# Patient Record
Sex: FEMALE | Race: WHITE | Employment: OTHER | URBAN - METROPOLITAN AREA
[De-identification: names, ages, dates, MRNs, and addresses within clinical notes are randomized per-mention and may not be internally consistent; named-entity substitution may affect disease eponyms.]

---

## 2018-03-11 ENCOUNTER — APPOINTMENT (OUTPATIENT)
Dept: GENERAL RADIOLOGY | Facility: HOSPITAL | Age: 73
DRG: 040 | End: 2018-03-11
Attending: EMERGENCY MEDICINE
Payer: MEDICARE

## 2018-03-11 ENCOUNTER — APPOINTMENT (OUTPATIENT)
Dept: CV DIAGNOSTICS | Facility: HOSPITAL | Age: 73
DRG: 040 | End: 2018-03-11
Attending: INTERNAL MEDICINE
Payer: MEDICARE

## 2018-03-11 ENCOUNTER — APPOINTMENT (OUTPATIENT)
Dept: CT IMAGING | Facility: HOSPITAL | Age: 73
DRG: 040 | End: 2018-03-11
Attending: NURSE PRACTITIONER
Payer: MEDICARE

## 2018-03-11 ENCOUNTER — HOSPITAL ENCOUNTER (INPATIENT)
Facility: HOSPITAL | Age: 73
LOS: 10 days | Discharge: NURSING, REHAB OR LONG-TERM CARE | DRG: 040 | End: 2018-03-21
Attending: EMERGENCY MEDICINE | Admitting: STUDENT IN AN ORGANIZED HEALTH CARE EDUCATION/TRAINING PROGRAM
Payer: MEDICARE

## 2018-03-11 ENCOUNTER — APPOINTMENT (OUTPATIENT)
Dept: CT IMAGING | Facility: HOSPITAL | Age: 73
DRG: 040 | End: 2018-03-11
Attending: EMERGENCY MEDICINE
Payer: MEDICARE

## 2018-03-11 ENCOUNTER — APPOINTMENT (OUTPATIENT)
Dept: ULTRASOUND IMAGING | Facility: HOSPITAL | Age: 73
DRG: 040 | End: 2018-03-11
Attending: INTERNAL MEDICINE
Payer: MEDICARE

## 2018-03-11 DIAGNOSIS — R77.8 TROPONIN LEVEL ELEVATED: ICD-10-CM

## 2018-03-11 DIAGNOSIS — I63.9 ACUTE CVA (CEREBROVASCULAR ACCIDENT) (HCC): Primary | ICD-10-CM

## 2018-03-11 DIAGNOSIS — R73.9 HYPERGLYCEMIA: ICD-10-CM

## 2018-03-11 DIAGNOSIS — E11.8 TYPE 2 DIABETES MELLITUS WITH COMPLICATION, WITHOUT LONG-TERM CURRENT USE OF INSULIN (HCC): ICD-10-CM

## 2018-03-11 DIAGNOSIS — I16.1 HYPERTENSIVE EMERGENCY: ICD-10-CM

## 2018-03-11 PROBLEM — R79.89 TROPONIN LEVEL ELEVATED: Status: ACTIVE | Noted: 2018-03-11

## 2018-03-11 LAB
ALBUMIN SERPL-MCNC: 3.5 G/DL (ref 3.5–4.8)
ALP LIVER SERPL-CCNC: 153 U/L (ref 55–142)
ALT SERPL-CCNC: 30 U/L (ref 14–54)
APTT PPP: 27.2 SECONDS (ref 25–34)
AST SERPL-CCNC: 31 U/L (ref 15–41)
BASOPHILS # BLD AUTO: 0.06 X10(3) UL (ref 0–0.1)
BASOPHILS NFR BLD AUTO: 0.5 %
BILIRUB SERPL-MCNC: 0.7 MG/DL (ref 0.1–2)
BILIRUB UR QL STRIP.AUTO: NEGATIVE
BUN BLD-MCNC: 16 MG/DL (ref 8–20)
CALCIUM BLD-MCNC: 9 MG/DL (ref 8.3–10.3)
CHLORIDE: 99 MMOL/L (ref 101–111)
CLARITY UR REFRACT.AUTO: CLEAR
CO2: 27 MMOL/L (ref 22–32)
CREAT BLD-MCNC: 1.05 MG/DL (ref 0.55–1.02)
EOSINOPHIL # BLD AUTO: 0.17 X10(3) UL (ref 0–0.3)
EOSINOPHIL NFR BLD AUTO: 1.4 %
ERYTHROCYTE [DISTWIDTH] IN BLOOD BY AUTOMATED COUNT: 12 % (ref 11.5–16)
EST. AVERAGE GLUCOSE BLD GHB EST-MCNC: 286 MG/DL (ref 68–126)
GLUCOSE BLD-MCNC: 174 MG/DL (ref 65–99)
GLUCOSE BLD-MCNC: 195 MG/DL (ref 65–99)
GLUCOSE BLD-MCNC: 217 MG/DL (ref 65–99)
GLUCOSE BLD-MCNC: 338 MG/DL (ref 65–99)
GLUCOSE BLD-MCNC: 367 MG/DL (ref 70–99)
GLUCOSE UR STRIP.AUTO-MCNC: >=500 MG/DL
HBA1C MFR BLD HPLC: 11.6 % (ref ?–5.7)
HCT VFR BLD AUTO: 46.2 % (ref 34–50)
HGB BLD-MCNC: 16.1 G/DL (ref 12–16)
IMMATURE GRANULOCYTE COUNT: 0.04 X10(3) UL (ref 0–1)
IMMATURE GRANULOCYTE RATIO %: 0.3 %
INR BLD: 1.03 (ref 0.89–1.11)
LEUKOCYTE ESTERASE UR QL STRIP.AUTO: NEGATIVE
LYMPHOCYTES # BLD AUTO: 2.7 X10(3) UL (ref 0.9–4)
LYMPHOCYTES NFR BLD AUTO: 22.7 %
M PROTEIN MFR SERPL ELPH: 7.5 G/DL (ref 6.1–8.3)
MCH RBC QN AUTO: 29.4 PG (ref 27–33.2)
MCHC RBC AUTO-ENTMCNC: 34.8 G/DL (ref 31–37)
MCV RBC AUTO: 84.5 FL (ref 81–100)
MONOCYTES # BLD AUTO: 0.81 X10(3) UL (ref 0.1–1)
MONOCYTES NFR BLD AUTO: 6.8 %
NEUTROPHIL ABS PRELIM: 8.13 X10 (3) UL (ref 1.3–6.7)
NEUTROPHILS # BLD AUTO: 8.13 X10(3) UL (ref 1.3–6.7)
NEUTROPHILS NFR BLD AUTO: 68.3 %
NITRITE UR QL STRIP.AUTO: NEGATIVE
PH UR STRIP.AUTO: 7 [PH] (ref 4.5–8)
PLATELET # BLD AUTO: 296 10(3)UL (ref 150–450)
POTASSIUM SERPL-SCNC: 3.1 MMOL/L (ref 3.6–5.1)
PROT UR STRIP.AUTO-MCNC: 100 MG/DL
PSA SERPL DL<=0.01 NG/ML-MCNC: 13.5 SECONDS (ref 12–14.3)
RBC # BLD AUTO: 5.47 X10(6)UL (ref 3.8–5.1)
RED CELL DISTRIBUTION WIDTH-SD: 36.3 FL (ref 35.1–46.3)
SODIUM SERPL-SCNC: 136 MMOL/L (ref 136–144)
SP GR UR STRIP.AUTO: 1.01 (ref 1–1.03)
TROPONIN: 4.12 NG/ML (ref ?–0.05)
UROBILINOGEN UR STRIP.AUTO-MCNC: <2 MG/DL
WBC # BLD AUTO: 11.9 X10(3) UL (ref 4–13)

## 2018-03-11 PROCEDURE — 71045 X-RAY EXAM CHEST 1 VIEW: CPT | Performed by: EMERGENCY MEDICINE

## 2018-03-11 PROCEDURE — 70450 CT HEAD/BRAIN W/O DYE: CPT | Performed by: EMERGENCY MEDICINE

## 2018-03-11 PROCEDURE — 93306 TTE W/DOPPLER COMPLETE: CPT | Performed by: INTERNAL MEDICINE

## 2018-03-11 PROCEDURE — 70450 CT HEAD/BRAIN W/O DYE: CPT | Performed by: NURSE PRACTITIONER

## 2018-03-11 PROCEDURE — 99223 1ST HOSP IP/OBS HIGH 75: CPT | Performed by: OTHER

## 2018-03-11 PROCEDURE — 70496 CT ANGIOGRAPHY HEAD: CPT | Performed by: EMERGENCY MEDICINE

## 2018-03-11 PROCEDURE — 99291 CRITICAL CARE FIRST HOUR: CPT | Performed by: OTHER

## 2018-03-11 PROCEDURE — 99291 CRITICAL CARE FIRST HOUR: CPT | Performed by: STUDENT IN AN ORGANIZED HEALTH CARE EDUCATION/TRAINING PROGRAM

## 2018-03-11 PROCEDURE — 70498 CT ANGIOGRAPHY NECK: CPT | Performed by: EMERGENCY MEDICINE

## 2018-03-11 RX ORDER — ASPIRIN 300 MG
300 SUPPOSITORY, RECTAL RECTAL ONCE
Status: COMPLETED | OUTPATIENT
Start: 2018-03-11 | End: 2018-03-11

## 2018-03-11 RX ORDER — MORPHINE SULFATE 4 MG/ML
1 INJECTION, SOLUTION INTRAMUSCULAR; INTRAVENOUS EVERY 2 HOUR PRN
Status: DISCONTINUED | OUTPATIENT
Start: 2018-03-11 | End: 2018-03-21

## 2018-03-11 RX ORDER — LABETALOL HYDROCHLORIDE 5 MG/ML
10 INJECTION, SOLUTION INTRAVENOUS EVERY 10 MIN PRN
Status: DISCONTINUED | OUTPATIENT
Start: 2018-03-11 | End: 2018-03-11

## 2018-03-11 RX ORDER — ASPIRIN 300 MG
300 SUPPOSITORY, RECTAL RECTAL DAILY
Status: DISCONTINUED | OUTPATIENT
Start: 2018-03-11 | End: 2018-03-12

## 2018-03-11 RX ORDER — MORPHINE SULFATE 4 MG/ML
1 INJECTION, SOLUTION INTRAMUSCULAR; INTRAVENOUS EVERY 2 HOUR PRN
Status: DISCONTINUED | OUTPATIENT
Start: 2018-03-11 | End: 2018-03-11

## 2018-03-11 RX ORDER — SODIUM PHOSPHATE, DIBASIC AND SODIUM PHOSPHATE, MONOBASIC 7; 19 G/133ML; G/133ML
1 ENEMA RECTAL ONCE AS NEEDED
Status: DISCONTINUED | OUTPATIENT
Start: 2018-03-11 | End: 2018-03-21

## 2018-03-11 RX ORDER — METOCLOPRAMIDE HYDROCHLORIDE 5 MG/ML
10 INJECTION INTRAMUSCULAR; INTRAVENOUS EVERY 8 HOURS PRN
Status: DISCONTINUED | OUTPATIENT
Start: 2018-03-11 | End: 2018-03-21

## 2018-03-11 RX ORDER — SENNOSIDES 8.6 MG
17.2 TABLET ORAL NIGHTLY
Status: DISCONTINUED | OUTPATIENT
Start: 2018-03-11 | End: 2018-03-21

## 2018-03-11 RX ORDER — ACETAMINOPHEN 650 MG/1
650 SUPPOSITORY RECTAL EVERY 4 HOURS PRN
Status: DISCONTINUED | OUTPATIENT
Start: 2018-03-11 | End: 2018-03-21

## 2018-03-11 RX ORDER — DOCUSATE SODIUM 100 MG/1
100 CAPSULE, LIQUID FILLED ORAL 2 TIMES DAILY
Status: DISCONTINUED | OUTPATIENT
Start: 2018-03-11 | End: 2018-03-21

## 2018-03-11 RX ORDER — ASPIRIN 81 MG/1
324 TABLET, CHEWABLE ORAL ONCE
Status: DISCONTINUED | OUTPATIENT
Start: 2018-03-11 | End: 2018-03-11

## 2018-03-11 RX ORDER — MORPHINE SULFATE 4 MG/ML
2 INJECTION, SOLUTION INTRAMUSCULAR; INTRAVENOUS EVERY 2 HOUR PRN
Status: DISCONTINUED | OUTPATIENT
Start: 2018-03-11 | End: 2018-03-11

## 2018-03-11 RX ORDER — LABETALOL HYDROCHLORIDE 5 MG/ML
10 INJECTION, SOLUTION INTRAVENOUS EVERY 10 MIN PRN
Status: COMPLETED | OUTPATIENT
Start: 2018-03-11 | End: 2018-03-13

## 2018-03-11 RX ORDER — PHENYLEPHRINE HCL IN 0.9% NACL 50MG/250ML
PLASTIC BAG, INJECTION (ML) INTRAVENOUS CONTINUOUS PRN
Status: DISCONTINUED | OUTPATIENT
Start: 2018-03-11 | End: 2018-03-11

## 2018-03-11 RX ORDER — ACETAMINOPHEN 325 MG/1
650 TABLET ORAL EVERY 4 HOURS PRN
Status: DISCONTINUED | OUTPATIENT
Start: 2018-03-11 | End: 2018-03-11

## 2018-03-11 RX ORDER — BISACODYL 10 MG
10 SUPPOSITORY, RECTAL RECTAL
Status: DISCONTINUED | OUTPATIENT
Start: 2018-03-11 | End: 2018-03-21

## 2018-03-11 RX ORDER — POLYETHYLENE GLYCOL 3350 17 G/17G
17 POWDER, FOR SOLUTION ORAL DAILY PRN
Status: DISCONTINUED | OUTPATIENT
Start: 2018-03-11 | End: 2018-03-21

## 2018-03-11 RX ORDER — LABETALOL HYDROCHLORIDE 5 MG/ML
10 INJECTION, SOLUTION INTRAVENOUS ONCE
Status: COMPLETED | OUTPATIENT
Start: 2018-03-11 | End: 2018-03-11

## 2018-03-11 RX ORDER — ENOXAPARIN SODIUM 100 MG/ML
40 INJECTION SUBCUTANEOUS DAILY
Status: DISCONTINUED | OUTPATIENT
Start: 2018-03-11 | End: 2018-03-21

## 2018-03-11 RX ORDER — ATORVASTATIN CALCIUM 80 MG/1
80 TABLET, FILM COATED ORAL NIGHTLY
Status: DISCONTINUED | OUTPATIENT
Start: 2018-03-11 | End: 2018-03-12

## 2018-03-11 RX ORDER — SENNOSIDES 8.6 MG
17.2 TABLET ORAL NIGHTLY
Status: DISCONTINUED | OUTPATIENT
Start: 2018-03-11 | End: 2018-03-11

## 2018-03-11 RX ORDER — SODIUM CHLORIDE 9 MG/ML
INJECTION, SOLUTION INTRAVENOUS CONTINUOUS
Status: ACTIVE | OUTPATIENT
Start: 2018-03-11 | End: 2018-03-13

## 2018-03-11 RX ORDER — ASPIRIN 325 MG
325 TABLET ORAL DAILY
Status: DISCONTINUED | OUTPATIENT
Start: 2018-03-11 | End: 2018-03-12

## 2018-03-11 RX ORDER — MORPHINE SULFATE 4 MG/ML
2 INJECTION, SOLUTION INTRAMUSCULAR; INTRAVENOUS EVERY 2 HOUR PRN
Status: DISCONTINUED | OUTPATIENT
Start: 2018-03-11 | End: 2018-03-21

## 2018-03-11 RX ORDER — INSULIN ASPART 100 [IU]/ML
0.15 INJECTION, SOLUTION INTRAVENOUS; SUBCUTANEOUS ONCE
Status: COMPLETED | OUTPATIENT
Start: 2018-03-11 | End: 2018-03-11

## 2018-03-11 RX ORDER — ONDANSETRON 2 MG/ML
4 INJECTION INTRAMUSCULAR; INTRAVENOUS EVERY 6 HOURS PRN
Status: DISCONTINUED | OUTPATIENT
Start: 2018-03-11 | End: 2018-03-21

## 2018-03-11 RX ORDER — ACETAMINOPHEN 650 MG/1
650 SUPPOSITORY RECTAL EVERY 4 HOURS PRN
Status: DISCONTINUED | OUTPATIENT
Start: 2018-03-11 | End: 2018-03-11

## 2018-03-11 RX ORDER — BISACODYL 10 MG
10 SUPPOSITORY, RECTAL RECTAL
Status: DISCONTINUED | OUTPATIENT
Start: 2018-03-11 | End: 2018-03-11

## 2018-03-11 RX ORDER — ACETAMINOPHEN 325 MG/1
650 TABLET ORAL EVERY 4 HOURS PRN
Status: DISCONTINUED | OUTPATIENT
Start: 2018-03-11 | End: 2018-03-21

## 2018-03-11 RX ORDER — PHENYLEPHRINE HCL IN 0.9% NACL 50MG/250ML
PLASTIC BAG, INJECTION (ML) INTRAVENOUS CONTINUOUS PRN
Status: DISCONTINUED | OUTPATIENT
Start: 2018-03-11 | End: 2018-03-21

## 2018-03-11 RX ORDER — NITROGLYCERIN 20 MG/100ML
INJECTION INTRAVENOUS CONTINUOUS
Status: DISCONTINUED | OUTPATIENT
Start: 2018-03-11 | End: 2018-03-16

## 2018-03-11 RX ORDER — DEXTROSE MONOHYDRATE 25 G/50ML
50 INJECTION, SOLUTION INTRAVENOUS
Status: DISCONTINUED | OUTPATIENT
Start: 2018-03-11 | End: 2018-03-21

## 2018-03-11 NOTE — ED NOTES
Report received from [facility] at this time. Patient assisted onto bedside commode. Waiting for chest xray at this time. VS obtained. MD remains aware of patient status. Daughter at bedside.

## 2018-03-11 NOTE — H&P
[facility]  History and Physical     Manisha Montez Patient Status:  Inpatient    1945 MRN [id number]   [facility] 6NE-A Attending Gianfranco Bowen MD   Hosp Day # 0 PCP No primary care provider on file.      Chief Complaint: we rhonchi. Cardiovascular: S1, S2. Regular rate and rhythm. No murmurs, rubs or gallops. Equal pulses. Chest and Back: No tenderness or deformity. Abdomen: Soft, nontender, nondistended. Positive bowel sounds. No rebound, guarding or organomegaly.   Fredrich Hammed

## 2018-03-11 NOTE — ED NOTES
Leaving ED for CT at this time. Plan to go directly from CT to 6620. Daughter to meet patient upstairs.

## 2018-03-11 NOTE — PROGRESS NOTES
Notified Dr. Myles Xiong of patient's progressive weakness and numbness to left sided extremities as well as facial droop and slur. Cardene adjusted to keep SBP below 160 and orders to keep HOB less than 20 degrees.

## 2018-03-11 NOTE — ED NOTES
Spoke with Dr. Lori Hickman regarding patient's potassium, verbal order received to order K rider 40 mEq at this time. Racheal Gilliam RN in ICU remains updated with delays in transport upstairs at this time. Waiting for CT at this time.

## 2018-03-11 NOTE — ED NOTES
Daughter arrives to bedside. Talking to pt about poc. Pt mentions it is a HIPPA violation to talk about her care in front of daughter. Daughter becomes tearful, sts she is a physician. Sts to pt she will leave. Pt agrees to let daughter stay at bedside.  Em

## 2018-03-11 NOTE — ED PROVIDER NOTES
Patient Seen in: [facility] Emergency Department    History   Patient presents with:  Fatigue (constitutional, neurologic)    Stated Complaint: Weakness    HPI    82-year-old female presents with left-sided weakness.   She reports that approximately Neck: supple, no rigidity   Lungs: good air exchange and clear   Heart: regular rate rhythm and no murmur   Abdomen: Soft and nontender. No abdominal masses. No peritoneal signs   Extremities: no edema, normal peripheral pulses   Neuro: Alert oriented. ---------                               -----------         ------                     CBC W/ DIFFERENTIAL[[id number]]          Abnormal            Final result                 Please view results for these tests on the individual orders. AP PORTABLE  (CPT=71045)  TECHNIQUE:  AP chest radiograph was obtained. COMPARISON:  None. INDICATIONS:  Weakness  PATIENT STATED HISTORY: (As transcribed by Technologist)  Patient offered no additional information at this time.     FINDINGS:  Cardiac siz improved markedly on nitroglycerin drip. Aspirin given.     Admission disposition: 3/11/2018  9:57 AM           Disposition and Plan     Clinical Impression:  Acute CVA (cerebrovascular accident) ([facility] [address]  (primary encounter diagnosis)  Hyperglycemia  Troponi

## 2018-03-11 NOTE — PROGRESS NOTES
03/11/18 1529   Clinical Encounter Type   Visited With Patient; Health care provider   Routine Visit Introduction   Continue Visiting No  (upon request or as needed)   Patient Spiritual Encounters   Spiritual Interventions  provided introduction

## 2018-03-11 NOTE — CONSULTS
[facility]  Neurocritical Care       Name: Laila Cabral  MRN: [id number]  Admission Date/Time: 3/11/2018  8:27 AM  Primary Care Provider:  No primary care provider on file. Reason for Consultation:   Acute Stroke.     HPI:   Sheila Montgomery Marital status: Single              Spouse name:                       Years of education:                 Number of children:               Social History Main Topics    Smoking status: Never Smoker HCl (REGLAN) injection 10 mg 10 mg Intravenous Q8H PRN   Enoxaparin Sodium (LOVENOX) 40 MG/0.4ML injection 40 mg 40 mg Subcutaneous Daily   potassium chloride 40 mEq in sodium chloride 0.9 % 250 mL IVPB 40 mEq Intravenous Once   glucose (DEX4) oral liquid 2: Visual acuity and fields normal on gross exam. Pupils equal, round, reactive to light and accomodation. # 3, 4, 6: Eyes move in all 6 cardinal directions normally and no Ptosis.    # 5: Facial sensation to temp and light touch normal.   #7: left facial (cpt=71045)    Result Date: 3/11/2018  PROCEDURE:  XR CHEST AP PORTABLE  (CPT=71045)  TECHNIQUE:  AP chest radiograph was obtained. COMPARISON:  None.   INDICATIONS:  Weakness  PATIENT STATED HISTORY: (As transcribed by Technologist)  Patient offered no ad common carotid artery. VERTEBRALS:  No hemodynamically significant stenosis or dissection. INTRACRANIAL:        There is no evidence of intracranial aneurysm.  There is narrowing of the supra clinoid portion of the internal carotid arteries bilaterally o Problem:    Acute CVA (cerebrovascular accident) ([facility] [address]  Active Problems:    Small vessel stroke (HCC)    Hyperglycemia    Troponin level elevated    Hypertensive emergency    Acute RMCA stroke with left facial droop, left sided hemiparesis and slurred spee clinical staff. Thank you for allowing me to participate in the care of this patient. Kt Huntley MD  Medical Director - Stroke and Neurocritical Care  [facility] - In affiliation with [facility].   Board Certified

## 2018-03-11 NOTE — ED NOTES
Per neurologist at bedside, instructed to maintain BP between 120 and 190. Nitro dose increased at this time.

## 2018-03-11 NOTE — ED NOTES
Informed by DR. Ham that neurology is planning to see patient at bedside prior to her going to [email or web address] and [address]. Patient and daughter remain updated with plan of care. MD aware of blood glucose and BP.

## 2018-03-11 NOTE — CONSULTS
[facility]    PATIENT'S NAME: Irineo Mills   ATTENDING PHYSICIAN: Jesusita Dakins, MD   CONSULTING PHYSICIAN: Chaparrita Das M.D.    PATIENT ACCOUNT#:   [de-identified]    LOCATION:  [facility]  MEDICAL RECORD #:   [id number]       DATE OF BIRTH:  [birth year] VITAL SIGNS:  Blood pressure 240/100 after labetalol. HEENT:  Tongue drifts to the left. There is left facial droop. LUNGS:  Clear. HEART:  S1, S2. No significant murmurs. ABDOMEN:  Soft. NEUROLOGIC:  She has left arm droop.   Slight weakness of le

## 2018-03-11 NOTE — ED NOTES
Patient assisted onto bedside commode at this time. Remains aware of plan of care. Report given to Janice Carson for 6840 in CNICU. Plan to go to CT first and then directly to room for admission. MD in agreement with this plan.

## 2018-03-11 NOTE — PROGRESS NOTES
82 y/o female with hx HTN, DM presents with left facial droop and left side weakness. /100. Troponin elevated at 4. EKG possible old inferior MI. No chest pain or SOB. Would treat CVA. No need for heparin from our standpoint at this time. Echo today.

## 2018-03-11 NOTE — PROGRESS NOTES
Received patient from ER via stretcher, alert and oriented X 4. Slight left facial droop noted with slight speech slur. Left sided extremities noted to be weaker than right. Patient on room air.   Nitroglycerin drip switched to Cardene to keep SBP betwee

## 2018-03-11 NOTE — SLP NOTE
ADULT SWALLOWING EVALUATION    ASSESSMENT    ASSESSMENT/OVERALL IMPRESSION:  This patient was seen for a bedside swallow study per stroke protocol.  Nemo Manriquez is a a(n) 81year old female with history of uncontrolled hypertension, DM, who presented to position; Slow rate;Small bites and sips  Medication Administration Recommendations: No restrictions  Treatment Plan/Recommendations: Communication evaluation;Aspiration precautions  Discharge Recommendations/Plan: Undetermined    HISTORY   MEDICAL HISTORY Upright;Midline    Oral Phase of Swallow: Within Functional Limits                      Pharyngeal Phase of Swallow: Within Functional Limits           (Please note: Silent aspiration cannot be evaluated clinically.  Videofluoroscopic Swallow Study is requi

## 2018-03-11 NOTE — ED NOTES
ER MD at bedside updating patient with plan of care. Patient reports having metal dental implants, unsure if MRI is an option. Plan for CT at this time as alternative. Plan for nitro drip as well d/t elevated BP, Dr. Lynne Dumont in agreement.  Plan to maintain

## 2018-03-11 NOTE — ED NOTES
Patient remains aware of NPO status d/t facial droop to left side and automatic dysphagia screen fail.

## 2018-03-11 NOTE — ED INITIAL ASSESSMENT (HPI)
Pt to ED with weakness and dizziness that started at 1830 last night. L side facial droop noted. Pt A/Ox3. Denies headache or blurry vision.

## 2018-03-11 NOTE — ED NOTES
Neurology at bedside at this time assessing patient. Plan to head to CT when available. Daughter remains at bedside. PCT continues to page code stroke neurologist per request of neurology.  ER MD states patient cannot be sent upstairs until Dr Stephan Andrade has bee

## 2018-03-11 NOTE — CONSULTS
[facility]    Report of Consultation    Andrew Side Patient Status:  Emergency    1945 MRN [id number]   Location [facility] Attending Mariola Daugherty MD   Hosp Day # 0 PCP No primary care provider on file.      Liban rhythm. NEUROLOGICAL:  This patient is alert and orientated x 3. Speech fluent. Able to follow simple commands. Face- has left decreased NL fold. + L drift  Pupils equally round and reactive to light. 2+ reflexes bilaterally. EOMs intact.   Visual fiel Neuromuscular medicine  [facility]   pager [phone]

## 2018-03-11 NOTE — ED NOTES
Dr Lynette Miller finally called back at 26  Explained that we have been paging him since [address] [place] [address]  He claims that he hasnt received any pages

## 2018-03-12 ENCOUNTER — APPOINTMENT (OUTPATIENT)
Dept: MRI IMAGING | Facility: HOSPITAL | Age: 73
DRG: 040 | End: 2018-03-12
Attending: Other
Payer: MEDICARE

## 2018-03-12 ENCOUNTER — APPOINTMENT (OUTPATIENT)
Dept: CT IMAGING | Facility: HOSPITAL | Age: 73
DRG: 040 | End: 2018-03-12
Attending: Other
Payer: MEDICARE

## 2018-03-12 LAB
APTT PPP: 27.1 SECONDS (ref 25–34)
ATRIAL RATE: 113 BPM
BASOPHILS # BLD AUTO: 0.08 X10(3) UL (ref 0–0.1)
BASOPHILS NFR BLD AUTO: 0.6 %
BUN BLD-MCNC: 12 MG/DL (ref 8–20)
CALCIUM BLD-MCNC: 8.3 MG/DL (ref 8.3–10.3)
CHLORIDE: 108 MMOL/L (ref 101–111)
CHOLEST SMN-MCNC: 223 MG/DL (ref ?–200)
CK: 130 IU/L (ref 26–192)
CO2: 26 MMOL/L (ref 22–32)
CREAT BLD-MCNC: 0.74 MG/DL (ref 0.55–1.02)
EOSINOPHIL # BLD AUTO: 0.13 X10(3) UL (ref 0–0.3)
EOSINOPHIL NFR BLD AUTO: 1 %
ERYTHROCYTE [DISTWIDTH] IN BLOOD BY AUTOMATED COUNT: 12.1 % (ref 11.5–16)
GLUCOSE BLD-MCNC: 136 MG/DL (ref 65–99)
GLUCOSE BLD-MCNC: 153 MG/DL (ref 65–99)
GLUCOSE BLD-MCNC: 168 MG/DL (ref 70–99)
GLUCOSE BLD-MCNC: 195 MG/DL (ref 65–99)
GLUCOSE BLD-MCNC: 226 MG/DL (ref 65–99)
HAV IGM SER QL: 1.6 MG/DL (ref 1.7–3)
HCT VFR BLD AUTO: 40.2 % (ref 34–50)
HDLC SERPL-MCNC: 43 MG/DL (ref 45–?)
HDLC SERPL: 5.19 {RATIO} (ref ?–4.44)
HGB BLD-MCNC: 13.8 G/DL (ref 12–16)
IMMATURE GRANULOCYTE COUNT: 0.04 X10(3) UL (ref 0–1)
IMMATURE GRANULOCYTE RATIO %: 0.3 %
INR BLD: 1.11 (ref 0.89–1.11)
LDLC SERPL CALC-MCNC: 150 MG/DL (ref ?–130)
LYMPHOCYTES # BLD AUTO: 3.25 X10(3) UL (ref 0.9–4)
LYMPHOCYTES NFR BLD AUTO: 24.9 %
MCH RBC QN AUTO: 29.2 PG (ref 27–33.2)
MCHC RBC AUTO-ENTMCNC: 34.3 G/DL (ref 31–37)
MCV RBC AUTO: 85 FL (ref 81–100)
MONOCYTES # BLD AUTO: 1.01 X10(3) UL (ref 0.1–1)
MONOCYTES NFR BLD AUTO: 7.7 %
NEUTROPHIL ABS PRELIM: 8.56 X10 (3) UL (ref 1.3–6.7)
NEUTROPHILS # BLD AUTO: 8.56 X10(3) UL (ref 1.3–6.7)
NEUTROPHILS NFR BLD AUTO: 65.5 %
NONHDLC SERPL-MCNC: 180 MG/DL (ref ?–130)
P AXIS: 78 DEGREES
P-R INTERVAL: 184 MS
PHOSPHATE SERPL-MCNC: 2.8 MG/DL (ref 2.5–4.9)
PLATELET # BLD AUTO: 270 10(3)UL (ref 150–450)
POTASSIUM SERPL-SCNC: 3.2 MMOL/L (ref 3.6–5.1)
POTASSIUM SERPL-SCNC: 3.5 MMOL/L (ref 3.6–5.1)
POTASSIUM SERPL-SCNC: 3.8 MMOL/L (ref 3.6–5.1)
PSA SERPL DL<=0.01 NG/ML-MCNC: 14.3 SECONDS (ref 12–14.3)
Q-T INTERVAL: 344 MS
QRS DURATION: 102 MS
QTC CALCULATION (BEZET): 471 MS
R AXIS: 0 DEGREES
RBC # BLD AUTO: 4.73 X10(6)UL (ref 3.8–5.1)
RED CELL DISTRIBUTION WIDTH-SD: 37.4 FL (ref 35.1–46.3)
SODIUM SERPL-SCNC: 141 MMOL/L (ref 136–144)
T AXIS: 93 DEGREES
TRIGL SERPL-MCNC: 151 MG/DL (ref ?–150)
TROPONIN: 7.03 NG/ML (ref ?–0.05)
VENTRICULAR RATE: 113 BPM
VLDLC SERPL CALC-MCNC: 30 MG/DL (ref 5–40)
WBC # BLD AUTO: 13.1 X10(3) UL (ref 4–13)

## 2018-03-12 PROCEDURE — 70450 CT HEAD/BRAIN W/O DYE: CPT | Performed by: OTHER

## 2018-03-12 PROCEDURE — 99233 SBSQ HOSP IP/OBS HIGH 50: CPT | Performed by: STUDENT IN AN ORGANIZED HEALTH CARE EDUCATION/TRAINING PROGRAM

## 2018-03-12 PROCEDURE — 70551 MRI BRAIN STEM W/O DYE: CPT | Performed by: OTHER

## 2018-03-12 PROCEDURE — 99291 CRITICAL CARE FIRST HOUR: CPT | Performed by: OTHER

## 2018-03-12 RX ORDER — ASPIRIN 81 MG/1
81 TABLET, CHEWABLE ORAL DAILY
Status: DISCONTINUED | OUTPATIENT
Start: 2018-03-13 | End: 2018-03-21

## 2018-03-12 RX ORDER — LABETALOL 100 MG/1
100 TABLET, FILM COATED ORAL EVERY 12 HOURS SCHEDULED
Status: DISCONTINUED | OUTPATIENT
Start: 2018-03-12 | End: 2018-03-19

## 2018-03-12 RX ORDER — FAMOTIDINE 20 MG/1
20 TABLET ORAL 2 TIMES DAILY
Status: DISCONTINUED | OUTPATIENT
Start: 2018-03-12 | End: 2018-03-21

## 2018-03-12 RX ORDER — ROSUVASTATIN CALCIUM 20 MG/1
20 TABLET, COATED ORAL NIGHTLY
Status: DISCONTINUED | OUTPATIENT
Start: 2018-03-12 | End: 2018-03-21

## 2018-03-12 RX ORDER — MAGNESIUM SULFATE HEPTAHYDRATE 40 MG/ML
2 INJECTION, SOLUTION INTRAVENOUS ONCE
Status: COMPLETED | OUTPATIENT
Start: 2018-03-12 | End: 2018-03-12

## 2018-03-12 RX ORDER — LABETALOL 200 MG/1
200 TABLET, FILM COATED ORAL EVERY 12 HOURS SCHEDULED
Status: DISCONTINUED | OUTPATIENT
Start: 2018-03-12 | End: 2018-03-12

## 2018-03-12 RX ORDER — POTASSIUM CHLORIDE 14.9 MG/ML
20 INJECTION INTRAVENOUS ONCE
Status: COMPLETED | OUTPATIENT
Start: 2018-03-12 | End: 2018-03-12

## 2018-03-12 RX ORDER — ASPIRIN 81 MG/1
81 TABLET, CHEWABLE ORAL ONCE
Status: COMPLETED | OUTPATIENT
Start: 2018-03-12 | End: 2018-03-12

## 2018-03-12 RX ORDER — FAMOTIDINE 10 MG/ML
20 INJECTION, SOLUTION INTRAVENOUS 2 TIMES DAILY
Status: DISCONTINUED | OUTPATIENT
Start: 2018-03-12 | End: 2018-03-21

## 2018-03-12 RX ORDER — CLOPIDOGREL BISULFATE 75 MG/1
75 TABLET ORAL DAILY
Status: DISCONTINUED | OUTPATIENT
Start: 2018-03-12 | End: 2018-03-21

## 2018-03-12 NOTE — PHYSICAL THERAPY NOTE
PHYSICAL THERAPY EVALUATION - INPATIENT     Room Number: 9563/7949-H  Evaluation Date: 3/12/2018  Type of Evaluation: Initial  Physician Order: PT Eval and Treat    Presenting Problem: acute R MCA  Reason for Therapy: Mobility Dysfunction and Dischar safety  See OT for full assessment  RANGE OF MOTION AND STRENGTH ASSESSMENT  Upper extremity ROM and strength are within functional limits see OT evaluation    Lower extremity ROM is within functional limits PROM LLE    Lower extremity strength is within f tested  Comment : above per FIM    Skilled Therapy Provided: Patient received in supine with daughter present. Patient reports fatigue. Patient with head rotated to right but actively turns to left with cues. Transfers to EOB with max a.   Patient sat at to demonstrate supine - sit EOB @ level: minimum assistance     Goal #2 Assess transfers and ambulation as able     Goal #3 Patient is able to sit at EOB x 5 minutes with min a     Goal #4    Goal #5    Goal #6    Goal Comments: Goals established on 3/12/2

## 2018-03-12 NOTE — CONSULTS
[facility]  Diabetes Clinical Nurse Specialist Consult Note    Hasmukh Bi Patient Status:  Inpatient    1945 MRN [id number]   [facility] 6NE-A Attending Christian Menjivar MD   Hosp Day # 1 PCP No primary care provider on file.

## 2018-03-12 NOTE — SLP NOTE
REPEAT ADULT SWALLOWING EVALUATION    ASSESSMENT    ASSESSMENT/OVERALL IMPRESSION:  Repeat bedside swallow eval completed this am.  Pt experienced increased left weakness and numbness yesterday after initial b/s swallow eval was completed.   Pt was reported List  Principal Problem:    Acute CVA (cerebrovascular accident) ([facility] [address]  Active Problems:    Small vessel stroke (HCC)    Hyperglycemia    Troponin level elevated    Hypertensive emergency    Type 2 diabetes mellitus with complication, without long-term cur Videofluoroscopic Swallow Study is required to rule-out silent aspiration.)    Esophageal Phase of Swallow:  (recurrent belching)    GOALS  Goal #1 The patient will tolerate regular consistency and thin liquids without overt signs or symptoms of aspiration

## 2018-03-12 NOTE — OCCUPATIONAL THERAPY NOTE
OT order received. Attempted to see pt for eval, but spoke with RN who is requesting to check back following rounds to clarify activity orders. Will try again later as appropriate and as time permits. RN in agreement.

## 2018-03-12 NOTE — PLAN OF CARE
Patient reported that her left side is progressively getting weaker. She is unable to move her left arm and leg and numbness on the left side of her face is now worse than earlier. Neuro APN notified. Will do stat plain head CT tonight.  BP parameter changed

## 2018-03-12 NOTE — CM/SW NOTE
03/12/18 1500   CM/SW Referral Data   Referral Source Physician   Reason for Referral Discharge planning   Informant Patient   Social History   Recreational Drug/Alcohol Use no   Major Changes Last 6 Months no   Domestic/Partner Violence no   Suicidal I

## 2018-03-12 NOTE — PROGRESS NOTES
[facility]    Neuro critical care progress Note    Manisha Montez Patient Status:  Inpatient    1945 MRN [id number]   [facility] 6NE-A Attending Gianfranco Bowen MD   Hosp Day # 1 PCP No primary care provider on file.      Subjecti UNIT/ML flextouch 5 Units 5 Units Subcutaneous Daily   [START ON 3/13/2018] aspirin chewable tab 81 mg 81 mg Oral Daily   Clopidogrel Bisulfate (PLAVIX) tab 75 mg 75 mg Oral Daily   famoTIDine (PEPCID) tab 20 mg 20 mg Oral BID   Or      famoTIDine (PEPCID) 40 mg Subcutaneous Daily   [COMPLETED] potassium chloride 40 mEq in sodium chloride 0.9 % 250 mL IVPB 40 mEq Intravenous Once   [COMPLETED] iohexol (OMNIPAQUE) 350 MG/ML injection 100 mL 100 mL Intravenous ONCE PRN   glucose (DEX4) oral liquid 15 g 15 g Or paratracheal bronchogenic cyst.      CT head- 3/12/18    CONCLUSION:  No significant change from previous. Chronic microvascular ischemic changes and old lacunar infarcts in the basal ganglia.   No acute intracranial hemorrhage, mass effect or midline shif monitoring. Time spent 35 minutes to prevent neurologic instability. This involved direct patient intervention complex and decision-making and or extensive discussion with the patient/family and clinical status. (Exclusive of billlable procedures)

## 2018-03-12 NOTE — CONSULTS
[facility] Patient Status:  Inpatient    1945 MRN [id number]   [facility] 6NE-A Attending Nehal Jimenez MD   Hosp Day # 1 PCP No primary care provider on file.      Patient Identification  Bettyjane Schirmer is a 81 • High blood pressure        Past Surgical History:  No date: CATARACT      Comment: left eye      [COMPLETED] potassium chloride 40 mEq in sodium chloride 0.9 % 250 mL IVPB 40 mEq Intravenous Once   Followed by      [COMPLETED] potassium chloride IVPB p (MIRALAX) powder packet 17 g 17 g Oral Daily PRN   bisacodyl (DULCOLAX) rectal suppository 10 mg 10 mg Rectal Daily PRN   FLEET ENEMA (FLEET) 7-19 GM/118ML enema 133 mL 1 enema Rectal Once PRN   ondansetron HCl (ZOFRAN) injection 4 mg 4 mg Intravenous Q6H 1.6 03/12/2018   PHOS 2.8 03/12/2018   TROP 7.030 03/12/2018    03/12/2018   PGLU 153 03/12/2018       Review of Systems:  ROS as listed in HPI   Other 10 point review of systems within normal limits.     OBJECTIVE:    Blood pressure (!) 172/85, pulse bilaterally. Psychiatric: Awake, alert and oriented x 3. ASSESSMENT:  Impaired mobility and self-care secondary to CVA    Hyperglycemia    Hypertensive urgency    Elevated Troponin    Leukocytosis.     Risk for

## 2018-03-12 NOTE — PROGRESS NOTES
Cardiology Progress Note     PRIMARY CARDIOLOGIST: [facility]      CONSULT FOR: HTN MANAGEMENT IN SETTING OF CV AND SEVERE HTN, ELEVATED TROP       SUBJECTIVE: HEMIPARASIS, NO CHEST PAIN, NO SOB      Scheduled Meds:   • potassium chloride  20 mEq Intravenous Once sounds normal. No masses,  No organomegaly. No abdominal bruits. Extremities: Extremities normal, atraumatic, no cyanosis or edema. Pulses: 2+ and symmetric all four extremities.    Skin: Skin color, texture, turgor normal. No rashes or lesions   Neurol

## 2018-03-12 NOTE — OCCUPATIONAL THERAPY NOTE
OCCUPATIONAL THERAPY EVALUATION - INPATIENT     Room Number: 9897/5527-Q  Evaluation Date: 3/12/2018  Type of Evaluation: Initial  Presenting Problem: acute CVA    Physician Order: IP Consult to Occupational Therapy  Reason for Therapy: ADL/IADL Dysfunctio need for assistance and decreased awareness of need for safety  Awareness of Deficits:  decreased awareness of deficits    VISION  Tracking:  able to track stimulus in all quads without difficulty  Saccades:  intact  continue to assess further convergence improve motor planning, strength, and attention. Pt sat EOB x 10 mins with some weight bearing on to L to increase proprioception. Cues given to adjust L lean and sustain midline positioning. Pt able to make adjustments initially, but fatigues quickly. setting. This would aim to maximize independence/safety with self-care, t/f's, community mobility, and home management activities. Especially, since pt was completely independent PTA.      Patient Complexity  Occupational Profile/Medical History HIGH - Ex

## 2018-03-12 NOTE — PLAN OF CARE
Patient stable. Sensation on the left side of her face is now equal. With slight movement noted in left arm and leg strength when BP parameter increased. Her speech is slurred but much better. Will repeat CT of the brain this morning.   Patient refused insuli

## 2018-03-12 NOTE — PROGRESS NOTES
[facility]  Progress Note     Osiris Gallegos Patient Status:  Inpatient    1945 MRN [id number]   [facility] 6NE-A Attending Josette Adams MD   Hosp Day # 1 PCP No primary care provider on file.      Chief Complaint: weakness, f Epic.    Medications:   • potassium chloride  20 mEq Intravenous Once   • magnesium sulfate  2 g Intravenous Once   • insulin detemir  5 Units Subcutaneous Daily   • [START ON 3/13/2018] aspirin  81 mg Oral Daily   • Clopidogrel Bisulfate  75 mg Oral Daily well  Plan  Neuro/Cc on Cs, monitor BP  RF modification  Discussed pt uncontrolled DM type 2, she stated she is agreeable to meet w/ DM educator to consider insulin.    Sharron Padilla MD

## 2018-03-12 NOTE — PHYSICAL THERAPY NOTE
PT order received. Attempted to see pt for eval, but spoke with RN who is requesting to check back following rounds to clarify activity orders. Will try again later as appropriate and as time permits.   RN in agreement

## 2018-03-12 NOTE — PLAN OF CARE
Assumed patient care this am. Patient alert, oriented x4. Left facial droop, left arm flaccid with good sensation. Left leg attempts to lift off of bed, good sensation. Patient c/o tenderness over bladder, although incontinent episode of urine.  Bladder sca

## 2018-03-13 ENCOUNTER — APPOINTMENT (OUTPATIENT)
Dept: ULTRASOUND IMAGING | Facility: HOSPITAL | Age: 73
DRG: 040 | End: 2018-03-13
Attending: Other
Payer: MEDICARE

## 2018-03-13 LAB
ATRIAL RATE: 51 BPM
BASOPHILS # BLD AUTO: 0.06 X10(3) UL (ref 0–0.1)
BASOPHILS NFR BLD AUTO: 0.5 %
BUN BLD-MCNC: 16 MG/DL (ref 8–20)
CALCIUM BLD-MCNC: 8.7 MG/DL (ref 8.3–10.3)
CHLORIDE: 108 MMOL/L (ref 101–111)
CO2: 23 MMOL/L (ref 22–32)
CREAT BLD-MCNC: 0.88 MG/DL (ref 0.55–1.02)
EOSINOPHIL # BLD AUTO: 0.27 X10(3) UL (ref 0–0.3)
EOSINOPHIL NFR BLD AUTO: 2.4 %
ERYTHROCYTE [DISTWIDTH] IN BLOOD BY AUTOMATED COUNT: 12.3 % (ref 11.5–16)
GLUCOSE BLD-MCNC: 123 MG/DL (ref 65–99)
GLUCOSE BLD-MCNC: 131 MG/DL (ref 70–99)
GLUCOSE BLD-MCNC: 132 MG/DL (ref 65–99)
GLUCOSE BLD-MCNC: 138 MG/DL (ref 65–99)
GLUCOSE BLD-MCNC: 162 MG/DL (ref 65–99)
GLUCOSE BLD-MCNC: 198 MG/DL (ref 65–99)
HAV IGM SER QL: 2 MG/DL (ref 1.7–3)
HCT VFR BLD AUTO: 38.6 % (ref 34–50)
HGB BLD-MCNC: 13.1 G/DL (ref 12–16)
IMMATURE GRANULOCYTE COUNT: 0.03 X10(3) UL (ref 0–1)
IMMATURE GRANULOCYTE RATIO %: 0.3 %
LYMPHOCYTES # BLD AUTO: 3.42 X10(3) UL (ref 0.9–4)
LYMPHOCYTES NFR BLD AUTO: 30.6 %
MCH RBC QN AUTO: 29.4 PG (ref 27–33.2)
MCHC RBC AUTO-ENTMCNC: 33.9 G/DL (ref 31–37)
MCV RBC AUTO: 86.5 FL (ref 81–100)
MONOCYTES # BLD AUTO: 0.92 X10(3) UL (ref 0.1–1)
MONOCYTES NFR BLD AUTO: 8.2 %
NEUTROPHIL ABS PRELIM: 6.47 X10 (3) UL (ref 1.3–6.7)
NEUTROPHILS # BLD AUTO: 6.47 X10(3) UL (ref 1.3–6.7)
NEUTROPHILS NFR BLD AUTO: 58 %
P AXIS: 61 DEGREES
P-R INTERVAL: 190 MS
PLATELET # BLD AUTO: 241 10(3)UL (ref 150–450)
POTASSIUM SERPL-SCNC: 3.6 MMOL/L (ref 3.6–5.1)
POTASSIUM SERPL-SCNC: 3.6 MMOL/L (ref 3.6–5.1)
Q-T INTERVAL: 454 MS
QRS DURATION: 96 MS
QTC CALCULATION (BEZET): 418 MS
R AXIS: 1 DEGREES
RBC # BLD AUTO: 4.46 X10(6)UL (ref 3.8–5.1)
RED CELL DISTRIBUTION WIDTH-SD: 38.7 FL (ref 35.1–46.3)
SODIUM SERPL-SCNC: 141 MMOL/L (ref 136–144)
T AXIS: 109 DEGREES
TROPONIN: 4.87 NG/ML (ref ?–0.05)
VENTRICULAR RATE: 51 BPM
WBC # BLD AUTO: 11.2 X10(3) UL (ref 4–13)

## 2018-03-13 PROCEDURE — 99291 CRITICAL CARE FIRST HOUR: CPT | Performed by: OTHER

## 2018-03-13 PROCEDURE — 93970 EXTREMITY STUDY: CPT | Performed by: OTHER

## 2018-03-13 PROCEDURE — 99233 SBSQ HOSP IP/OBS HIGH 50: CPT | Performed by: STUDENT IN AN ORGANIZED HEALTH CARE EDUCATION/TRAINING PROGRAM

## 2018-03-13 PROCEDURE — 99233 SBSQ HOSP IP/OBS HIGH 50: CPT | Performed by: CLINICAL NURSE SPECIALIST

## 2018-03-13 RX ORDER — HYDRALAZINE HYDROCHLORIDE 20 MG/ML
10 INJECTION INTRAMUSCULAR; INTRAVENOUS
Status: DISCONTINUED | OUTPATIENT
Start: 2018-03-13 | End: 2018-03-16

## 2018-03-13 RX ORDER — LOSARTAN POTASSIUM 25 MG/1
25 TABLET ORAL DAILY
Status: DISCONTINUED | OUTPATIENT
Start: 2018-03-13 | End: 2018-03-14

## 2018-03-13 RX ORDER — POTASSIUM CHLORIDE 20 MEQ/1
40 TABLET, EXTENDED RELEASE ORAL ONCE
Status: DISCONTINUED | OUTPATIENT
Start: 2018-03-13 | End: 2018-03-13

## 2018-03-13 NOTE — PROGRESS NOTES
[facility]    Neuro critical care progress Note    Nancy Vazquez Patient Status:  Inpatient    1945 MRN [id number]   [facility] 6NE-A Attending Reid Dale MD   Hosp Day # 2 PCP No primary care provider on file.        Late e mEq 30 mEq Oral Once   Losartan Potassium (COZAAR) tab 25 mg 25 mg Oral Daily   hydrALAzine HCl (APRESOLINE) injection 10 mg 10 mg Intravenous Q3H PRN   [COMPLETED] potassium chloride 40 mEq in sodium chloride 0.9 % 250 mL IVPB 40 mEq Intravenous Once   Fo sodium (COLACE) cap 100 mg 100 mg Oral BID   PEG 3350 (MIRALAX) powder packet 17 g 17 g Oral Daily PRN   bisacodyl (DULCOLAX) rectal suppository 10 mg 10 mg Rectal Daily PRN   FLEET ENEMA (FLEET) 7-19 GM/118ML enema 133 mL 1 enema Rectal Once PRN   ondanse 1.05*  0.74   --    --   0.88   GFRAA  62  95   --    --   77   GFRNAA  54*  82   --    --   67   CA  9.0  8.3   --    --   8.7   ALB  3.5   --    --    --    --    NA  136  141   --    --   141   K  3.1*  3.2*  3.5*  3.8  3.6  3.6   CL  99*  108   --    - losartan 25 mg daily and Labetalol 100 mg Q 12hr  ECHO- Severe marked concentric left ventricular hypertrophy with good LV function     -Pulmonary:  - saturating well on RA     Renal:  - Monitor I/O’s and electrolytes.  Replace potassium.     - Gi:  - On ca

## 2018-03-13 NOTE — PROGRESS NOTES
[facility]  Progress Note     Amarilis Wetzel Patient Status:  Inpatient    1945 MRN [id number]   [facility] 6NE-A Attending Simon Parham MD   Hosp Day # 2 PCP No primary care provider on file.      Chief Complaint: weakness, f 30   --    --    --    --    BILT  0.7   --    --    --    --    TP  7.5   --    --    --    --        Estimated Creatinine Clearance: 51.4 mL/min (based on SCr of 0.88 mg/dL).     Recent Labs   Lab  03/11/18   0832  03/12/18   0421   PTP  13.5  14.3   INR pt  Poor oral intake       Estimated date of discharge: tbd  Discharge is dependent on: clinical course  At this point Ms. Demi Escobedo is expected to be discharge to: tbd    Plan of care discussed with RN, pt , daughter, Irlanda HerreraROBERT lynch,   3/13 /2018

## 2018-03-13 NOTE — OCCUPATIONAL THERAPY NOTE
Attempted to see pt this PM, pt getting US to rule out DVT, OT will follow up as approp pending results.

## 2018-03-13 NOTE — SLP NOTE
SPEECH/LANGUAGE/COGNITIVE EVALUATION - INPATIENT    Admission Date: 3/11/2018  Evaluation Date: 03/13/18    Reason for Referral:  (neuro changes 3/11 afternoon after initial b/s swallow)    ASSESSMENT & PLAN   ASSESSMENT & IMPRESSION  Pt seen at bedside th aspiration with 95 % accuracy over 1-2 session(s).  In Progress   Goal #2 The patient/family/caregiver will demonstrate understanding and implementation of aspiration precautions and swallow strategies independently over 1-2 session(s).   In Progress   Goal

## 2018-03-13 NOTE — PHYSICAL THERAPY NOTE
Attempted to see pt. Pt currently occupied with ultrasound of BLE's to r/o DVT. Will continue to follow as appropriate and pending results of doppler.

## 2018-03-13 NOTE — PROGRESS NOTES
[facility]  Diabetes Clinical Nurse Specialist Progress Note    Velasquez Urena Patient Status:  Inpatient    1945 MRN [id number]   [facility] 6NE-A Attending Lauren Uriarte MD   Hosp Day # 2 PCP No primary care provider on file. Patient is hesitant about discharge to [facility], I discussed my very positive experiences of [facility] with her (a friend of mine had a similar stroke and got rehab at [facility]). Ordered videos for her to watch to begin teaching process.  Patient

## 2018-03-13 NOTE — PROGRESS NOTES
[facility]  Progress Note    Wanda Wyman Patient Status:  Inpatient    1945 MRN [id number]   [facility] 6NE-A Attending Christa Bates MD   Hosp Day # 2 PCP No primary care provider on file.        Subjective:  No chest pain or • Rosuvastatin Calcium  20 mg Oral Nightly   • Labetalol HCl  100 mg Oral 2 times per day   • Senna  17.2 mg Oral Nightly   • docusate sodium  100 mg Oral BID   • enoxaparin  40 mg Subcutaneous Daily   • Insulin Aspart Pen  1-10 Units Subcutaneous TID AC

## 2018-03-13 NOTE — PLAN OF CARE
Assumed care of this patient at 1. Neuro checks every 2 hours. Patient is AOx4. Right upper/lower extremities are strong/equal (5/5).  Neuro deficits are as followed: Left facial droop, absent left shoulder shrug, slurred speech, LUE 0/5 & LLE 2/5 (michael

## 2018-03-14 ENCOUNTER — APPOINTMENT (OUTPATIENT)
Dept: ULTRASOUND IMAGING | Facility: HOSPITAL | Age: 73
DRG: 040 | End: 2018-03-14
Attending: INTERNAL MEDICINE
Payer: MEDICARE

## 2018-03-14 LAB
BASOPHILS # BLD AUTO: 0.08 X10(3) UL (ref 0–0.1)
BASOPHILS NFR BLD AUTO: 0.8 %
BUN BLD-MCNC: 19 MG/DL (ref 8–20)
CALCIUM BLD-MCNC: 8.7 MG/DL (ref 8.3–10.3)
CHLORIDE: 108 MMOL/L (ref 101–111)
CO2: 22 MMOL/L (ref 22–32)
CREAT BLD-MCNC: 0.87 MG/DL (ref 0.55–1.02)
EOSINOPHIL # BLD AUTO: 0.32 X10(3) UL (ref 0–0.3)
EOSINOPHIL NFR BLD AUTO: 3.1 %
ERYTHROCYTE [DISTWIDTH] IN BLOOD BY AUTOMATED COUNT: 12.3 % (ref 11.5–16)
GLUCOSE BLD-MCNC: 132 MG/DL (ref 70–99)
GLUCOSE BLD-MCNC: 136 MG/DL (ref 65–99)
GLUCOSE BLD-MCNC: 175 MG/DL (ref 65–99)
GLUCOSE BLD-MCNC: 178 MG/DL (ref 65–99)
GLUCOSE BLD-MCNC: 279 MG/DL (ref 65–99)
HCT VFR BLD AUTO: 37.3 % (ref 34–50)
HGB BLD-MCNC: 13.3 G/DL (ref 12–16)
IMMATURE GRANULOCYTE COUNT: 0.03 X10(3) UL (ref 0–1)
IMMATURE GRANULOCYTE RATIO %: 0.3 %
LYMPHOCYTES # BLD AUTO: 2.5 X10(3) UL (ref 0.9–4)
LYMPHOCYTES NFR BLD AUTO: 23.9 %
MCH RBC QN AUTO: 30.5 PG (ref 27–33.2)
MCHC RBC AUTO-ENTMCNC: 35.7 G/DL (ref 31–37)
MCV RBC AUTO: 85.6 FL (ref 81–100)
MONOCYTES # BLD AUTO: 0.95 X10(3) UL (ref 0.1–1)
MONOCYTES NFR BLD AUTO: 9.1 %
NEUTROPHIL ABS PRELIM: 6.56 X10 (3) UL (ref 1.3–6.7)
NEUTROPHILS # BLD AUTO: 6.56 X10(3) UL (ref 1.3–6.7)
NEUTROPHILS NFR BLD AUTO: 62.8 %
PLATELET # BLD AUTO: 239 10(3)UL (ref 150–450)
POTASSIUM SERPL-SCNC: 3.5 MMOL/L (ref 3.6–5.1)
POTASSIUM SERPL-SCNC: 3.5 MMOL/L (ref 3.6–5.1)
RBC # BLD AUTO: 4.36 X10(6)UL (ref 3.8–5.1)
RED CELL DISTRIBUTION WIDTH-SD: 38.3 FL (ref 35.1–46.3)
SODIUM SERPL-SCNC: 140 MMOL/L (ref 136–144)
WBC # BLD AUTO: 10.4 X10(3) UL (ref 4–13)

## 2018-03-14 PROCEDURE — 99291 CRITICAL CARE FIRST HOUR: CPT | Performed by: OTHER

## 2018-03-14 PROCEDURE — 99232 SBSQ HOSP IP/OBS MODERATE 35: CPT | Performed by: HOSPITALIST

## 2018-03-14 PROCEDURE — 05H533Z INSERTION OF INFUSION DEVICE INTO RIGHT SUBCLAVIAN VEIN, PERCUTANEOUS APPROACH: ICD-10-PCS | Performed by: STUDENT IN AN ORGANIZED HEALTH CARE EDUCATION/TRAINING PROGRAM

## 2018-03-14 PROCEDURE — B546ZZA ULTRASONOGRAPHY OF RIGHT SUBCLAVIAN VEIN, GUIDANCE: ICD-10-PCS | Performed by: STUDENT IN AN ORGANIZED HEALTH CARE EDUCATION/TRAINING PROGRAM

## 2018-03-14 PROCEDURE — 99231 SBSQ HOSP IP/OBS SF/LOW 25: CPT | Performed by: CLINICAL NURSE SPECIALIST

## 2018-03-14 RX ORDER — LOSARTAN POTASSIUM 50 MG/1
50 TABLET ORAL DAILY
Status: DISCONTINUED | OUTPATIENT
Start: 2018-03-14 | End: 2018-03-15

## 2018-03-14 RX ORDER — FAMOTIDINE 10 MG/ML
20 INJECTION, SOLUTION INTRAVENOUS DAILY
Status: DISCONTINUED | OUTPATIENT
Start: 2018-03-14 | End: 2018-03-14

## 2018-03-14 RX ORDER — LOSARTAN POTASSIUM 25 MG/1
25 TABLET ORAL ONCE
Status: DISCONTINUED | OUTPATIENT
Start: 2018-03-14 | End: 2018-03-14

## 2018-03-14 RX ORDER — FAMOTIDINE 20 MG/1
20 TABLET ORAL DAILY
Status: DISCONTINUED | OUTPATIENT
Start: 2018-03-14 | End: 2018-03-14

## 2018-03-14 NOTE — PROGRESS NOTES
Seen and examined. Notes reviewed. Discussed with patient and her daughter at bedside. Reviewed recent history with nursing staff.     Currently feels okay yet frustrated and feels that her medical treatment has caused her deficits -- she jessica appears aida

## 2018-03-14 NOTE — SLP NOTE
SPEECH DAILY NOTE - INPATIENT    ASSESSMENT & PLAN   ASSESSMENT  Pt seen at bedside this AM for speech therapy services. Pt cooperative, pleasant, and alert. Per RN, pt tolerating diet well with no concerns of aspiration.  RN reporting pt c/o of \"heavy ton and swallow strategies independently over 1-2 session(s). In Progress, add additional session due to pt requiring education on strategies   Goal #3 The patient will participate in a cognitive/communication assessment.    Met          FOLLOW UP  Follow Up N

## 2018-03-14 NOTE — PLAN OF CARE
Pt to transfer to room 7623. Report called to [facility]. Bp parameters discussed as well as pt's refusal to take some ordered BP meds.  This RN asked the receiving RN to refer to Dr Edgard Matthews Note from today discussing pt's Bp.  Pt's family informed of new

## 2018-03-14 NOTE — PROGRESS NOTES
[facility]    Neuro critical care progress Note    Reyes Lezama Patient Status:  Inpatient    1945 MRN [id number]   [facility] ([place], [place]) 6NE-A Attending Zack Bravo MD   Hosp Day # 3 PCP No primary care provider on file.      Subjec chloride 0.9% IV bolus 250 mL 250 mL Intravenous Once   potassium chloride 40 mEq in sodium chloride 0.9 % 250 mL IVPB 40 mEq Intravenous Once   Prochlorperazine Edisylate (COMPAZINE) injection 10 mg 10 mg Intravenous Q6H PRN   [COMPLETED] Potassium Chlori Intravenous Q2H PRN   [COMPLETED] Labetalol HCl (TRANDATE) injection 10 mg 10 mg Intravenous Q10 Min PRN   phenylephrine in NaCl (MATTHEW-SYNEPHRINE) 50 mg/250 ml premix infusion SOLN 100-200 mcg/min Intravenous Continuous PRN   Senna (SENOKOT) tab TABS 17.2 m 12.1  12.3  12.3   NEPRELIM  8.56*  6.47  6.56   WBC  13.1*  11.2  10.4   PLT  270.0  241.0  239.0       Recent Labs   Lab  03/12/18   0421 03/12/18   2303  03/13/18   0423  03/14/18   0427   GLU  168*   --    --   131*  132*   BUN  12   --    --   16  1 set the goal higher. Discussed with cardiology and they are ok with a higher range   Continue Asa 81 mg and Plavix 75 mg daily. Unable to tolerate statin and refusing alternatives  PT/OT daily. Cardiac:  Hypertensive urgency-resolved.  BP management per

## 2018-03-14 NOTE — PROGRESS NOTES
Received pt from CCU, oriented to [address], family members at bedside. Pt alert and oriented x 4, breathing unlabored on room air. /90 upon arrival. Pt refused to take IV hydralazine but agreed to take PO labetalol.   Pt calm and with pleasant attitu

## 2018-03-14 NOTE — BH PROGRESS NOTE
Went to see the pt and talked to her nurse to find out why this liaison was seeing the pt. Was told due to pt telling the staff that she would be better off not around.   Went to see the pt and she stated she is not suicidal.  She said, she is anger at a c

## 2018-03-14 NOTE — OCCUPATIONAL THERAPY NOTE
OCCUPATIONAL THERAPY TREATMENT NOTE - INPATIENT     Room Number: 0249/8720-M  Session: 1   Number of Visits to Meet Established Goals: 7    Presenting Problem: acute CVA    History related to current admission: Pt with L side weakness.   In the ED pt with s Stand lift)    Skilled Therapy Provided: Pt was received up in chair for session, pt was able to use the sit to stand lift to stand with total assist for first 85% and the able to come to full stand for last 15% with use of right arm and leg with max ekta activities; Energy conservation/work simplification techniques;ADL training;IADL training;Visual perceptual training;Functional transfer training;UE strengthening/ROM; Endurance training;Patient/Family education;Patient/Family training;Neuromuscluar reeducat

## 2018-03-14 NOTE — PROGRESS NOTES
[facility]  Diabetes Clinical Nurse Specialist Progress Note    Kathleen Hay Patient Status:  Inpatient    1945 MRN [id number]   [facility] 6NE-A Attending Faiza Parker MD   Hosp Day # 3 PCP No primary care provider on file counseling patient regarding medications, side effects, treatment options, discharge planning.       Assessment/Plan:  Insulin: basal/bolus per hospitalists  BP control per cardiology  Coordinate with OT  Gave pt Thierry-maurice Guide glucometer  Discharge to [facility]

## 2018-03-14 NOTE — PLAN OF CARE
Assumed patient care this am. Upon initial assessment, patient's heart rate bradycardic and patient reports not feeling well. Patient feels dizzy and nauseated. Zofran given. Beta blocker held. Heart rate coming up to 70's while patient awake.  Patient's SB

## 2018-03-14 NOTE — PLAN OF CARE
Assumed care of this patient at 1. Neuro checks every 3 hours overnight. Patient is AOx4. Right upper/lower extremities are strong/equal (5/5).  Neuro deficits are as followed: Left facial droop, minimal/weak left shoulder shrug, slurred speech (improvin

## 2018-03-14 NOTE — PROGRESS NOTES
[facility]  Progress Note     Manisha Montez Patient Status:  Inpatient    1945 MRN [id number]   [facility] 6NE-A Attending Gianfranco Bowen MD   Hosp Day # 3 PCP No primary care provider on file.      Chief Complaint: weakness, f Estimated Creatinine Clearance: 52 mL/min (based on SCr of 0.87 mg/dL).     Recent Labs   Lab  03/12/18 0421   PTP  14.3   INR  1.11       Recent Labs   Lab  03/12/18 0421 03/13/18 0423   TROP  7.030*  4.870*   CK  130   --             Imaging: sx  Need to push working w/ staff for her rehab needs - very angry and feels we are causing all her issues   tx to CTU today? Agreeable to [facility]        Estimated date of discharge: tbd  Discharge is dependent on: clinical course  At this point Ms.  Sharpdillan Quevedo is

## 2018-03-15 LAB
GLUCOSE BLD-MCNC: 199 MG/DL (ref 65–99)
GLUCOSE BLD-MCNC: 202 MG/DL (ref 65–99)
GLUCOSE BLD-MCNC: 239 MG/DL (ref 65–99)
POTASSIUM SERPL-SCNC: 3.4 MMOL/L (ref 3.6–5.1)

## 2018-03-15 PROCEDURE — 99232 SBSQ HOSP IP/OBS MODERATE 35: CPT | Performed by: HOSPITALIST

## 2018-03-15 PROCEDURE — 99233 SBSQ HOSP IP/OBS HIGH 50: CPT | Performed by: OTHER

## 2018-03-15 PROCEDURE — 90792 PSYCH DIAG EVAL W/MED SRVCS: CPT | Performed by: OTHER

## 2018-03-15 RX ORDER — TRAMADOL HYDROCHLORIDE 50 MG/1
50 TABLET ORAL EVERY 6 HOURS PRN
Status: DISCONTINUED | OUTPATIENT
Start: 2018-03-15 | End: 2018-03-21

## 2018-03-15 RX ORDER — LORAZEPAM 0.5 MG/1
0.5 TABLET ORAL 3 TIMES DAILY PRN
Status: DISCONTINUED | OUTPATIENT
Start: 2018-03-15 | End: 2018-03-21

## 2018-03-15 RX ORDER — LOSARTAN POTASSIUM 50 MG/1
50 TABLET ORAL 2 TIMES DAILY
Status: DISCONTINUED | OUTPATIENT
Start: 2018-03-15 | End: 2018-03-21

## 2018-03-15 RX ORDER — SPIRONOLACTONE 25 MG/1
25 TABLET ORAL DAILY
Status: DISCONTINUED | OUTPATIENT
Start: 2018-03-15 | End: 2018-03-17

## 2018-03-15 NOTE — PROGRESS NOTES
[address]  Neurology Progress Note    Dianna Allred Patient Status:  Inpatient    1945 MRN [id number]   [facility] 7NE-A Attending Rakan Santo MD   Hosp Day # 4 PCP No primary care provider on file.      CC: Left sided w paratracheal cystic lesion    3/11/2018 Echocardiogram  Severe left ventricular hypertrophy with good LV function.   Decreased LV compliance    Assessment/Plan:  · CVA  · Ischemic Stroke Order Set  · ASA 81mg and Plavix 75mg daily  · ; Crestor 20mg q no acute distress  HEENT: normocephalic  Heart; normal J1/I0, regular rate and rhythm  Lungs: clear to auscultation bilaterally    Neuro:  Mental status:  Orientation: Alert and oriented to person, place, time  Speech fluent and conversational     CN: PERR follow    Juan Carlos Herrera MD, Neurology  [facility]  Pager [phone]  3/15/2018

## 2018-03-15 NOTE — PLAN OF CARE
Assumed care at 0730  A&Ox4, NSR on tele, BP improved, within goal parameters. Taking PO medication  Potassium replaced  Up to chair with total lift  Slight increase in PO intake, poor appetite  Attempted to complete diabetic and insulin education.  Pt refu

## 2018-03-15 NOTE — CONSULTS
[facility]  Report of Psychiatric Consultation    Abhay Payment Patient Status:  Inpatient    1945 MRN [id number]   [facility] 7NE-A Attending Gino Brown MD   Hosp Day # 4 PCP No primary care provider on file.      Date of A DM2    Recommendations:  1) Start Ativan 0.5mg three times a day as needed for anxiety. She said she \"will think about it\". 2) She declines starting a SSRI or anti-depressant to help with anxiety or depressive symptoms.      3) If she goes to [facility] stroke. Regarding her diabetes, she knew that too much sugar was bad. When she heard that artifical sweeteners could disrupt glucose homeostasis, she switched back to \"normal sugar\" to the detriment of her glucose control (ie. HbA1C being 11).      She is (APRESOLINE) injection 10 mg, 10 mg, Intravenous, Q3H PRN  •  insulin detemir (LEVEMIR) 100 UNIT/ML flextouch 5 Units, 5 Units, Subcutaneous, Daily  •  aspirin chewable tab 81 mg, 81 mg, Oral, Daily  •  Clopidogrel Bisulfate (PLAVIX) tab 75 mg, 75 mg, Oral 4-0.006 g chewable tab 8 tablet, 8 tablet, Oral, Q15 Min PRN  •  Insulin Aspart Pen (NOVOLOG) 100 UNIT/ML flexpen 1-10 Units, 1-10 Units, Subcutaneous, TID AC and HS  •  niCARdipine HCl in NaCl (CARDENE) 20 mg/200 ml premix infusion, 5-15 mg/hr, Intravenou obtained. PATIENT STATED HISTORY: (As transcribed by Technologist)  Patient presents with left-sided weakness. Increased numbness to the left side, arms and legs.          FINDINGS:     Limited exam.  Diffusion weighted sequence demonstrates restricted

## 2018-03-15 NOTE — PROGRESS NOTES
PSYCH CONSULT    Date of Admission: 3/11/18  Date of Service: 3/15/18  Reason for Consultation: Eval decision making capacity and anxiety/irritability    Impression: She is intelligent, but does not fully understand medical disorders or treatments.  Her hea she goes to [facility], I told her that there may be psychotherapists she can talk to there. 4) Continue to communicate the risks/benefits and consequences of her health care decisions.  If she hears about the negative consequences (ie another stroke if

## 2018-03-15 NOTE — PROGRESS NOTES
Seen and examined. Discussed with patient's daughter Lianne Bermudez and Dr. Baldo Engel.     Tele sinus yet she had a 10 second run of PAT during my visit -- asymptomatic    Blood pressures high with systolics 253'Z    Lungs clear  Ht RRR  abd soft  Ext no edema  Ne

## 2018-03-15 NOTE — OCCUPATIONAL THERAPY NOTE
OCCUPATIONAL THERAPY TREATMENT NOTE - INPATIENT     Room Number: 4403/4644-U  Session: 2   Number of Visits to Meet Established Goals: 7    Presenting Problem: acute CVA    History related to current admission: Pt with L side weakness.   In the ED pt with s Pt was receptive to teaching about exercises and positioning. Pt reported L thumb pain, \"acute\" pain. RN aware. Redness noted L hand and radial aspect of her L forearm.   Educated the patient about proper positioning of her L arm using pillows and towel exercise program). Additional Goals:  Pt will participate in PHQ-9 depression screen. -met  Pt will complete star cancellation test to examine L attention.     Pt will maintain midline positioning x 15 mins during ADLs with 2 cues to adjust min assist

## 2018-03-15 NOTE — PROGRESS NOTES
[facility]  Progress Note     Lyndsey Sosa Patient Status:  Inpatient    1945 MRN [id number]   [facility] 6NE-A Attending Joaquin Rosario MD   Hosp Day # 4 PCP No primary care provider on file.      Chief Complaint: weakness, f Imaging data reviewed in Epic.     Medications:   • potassium chloride 40mEq IVPB (peripheral line)  40 mEq Intravenous Once   • sodium chloride 0.9%  250 mL Intravenous Once   • Losartan Potassium  50 mg Oral Daily   • insulin detemir  5 Units Subcutaneous control/ BS control. Replace K if she will take      Estimated date of discharge: 2 days   Discharge is dependent on: clinical course/ Bp control   At this point Ms. Jair Grider is expected to be discharge to: [facility] 21 of care discussed with RN, pt ,  Phillip Smith

## 2018-03-15 NOTE — SLP NOTE
SPEECH DAILY NOTE - INPATIENT    ASSESSMENT & PLAN   ASSESSMENT  Pt seen for dysphagia tx to assess tolerance with recommended diet, ensure proper utilization of aspiration precautions and provide pt/family education.   Patient seen with regular diet and th

## 2018-03-15 NOTE — PLAN OF CARE
Assumed care at [address]. Pt A/O x4. RA. 2L of O2 at night for comfort. Neuro checks q4. Pt is unable to move L arm and unable to move L leg. Can wiggle L foot toes and dorsiflex L foot but unable to do plantar flexion. Sensation is equal on both sides.  L s

## 2018-03-15 NOTE — PHYSICAL THERAPY NOTE
PHYSICAL THERAPY TREATMENT NOTE - INPATIENT    Room Number: 7656/1855-A     Session: 2  Number of Visits to Meet Established Goals: 5    Presenting Problem: acute R MCA    History related to current admission: admitted with weakness and dizziness, L facia sitting on the side of the bed?: A Lot   How much help from another person does the patient currently need. ..   -   Moving to and from a bed to a chair (including a wheelchair)?: Total   -   Need to walk in hospital room?: Total   -   Climbing 3-5 steps wi deficits to assist patient in returning to prior to level of function. Continue to recommend acute rehab at [place] for intensive therapies to maximize function and independence. Recommend PRAFO for L ankle secondary to foot drop. Recommend Oksana lift for safe tr

## 2018-03-16 ENCOUNTER — APPOINTMENT (OUTPATIENT)
Dept: GENERAL RADIOLOGY | Facility: HOSPITAL | Age: 73
DRG: 040 | End: 2018-03-16
Attending: Other
Payer: MEDICARE

## 2018-03-16 LAB
GLUCOSE BLD-MCNC: 101 MG/DL (ref 65–99)
GLUCOSE BLD-MCNC: 121 MG/DL (ref 65–99)
GLUCOSE BLD-MCNC: 226 MG/DL (ref 65–99)
GLUCOSE BLD-MCNC: 233 MG/DL (ref 65–99)
POTASSIUM SERPL-SCNC: 3.7 MMOL/L (ref 3.6–5.1)
URIC ACID: 4.7 MG/DL (ref 2.4–8)

## 2018-03-16 PROCEDURE — 73130 X-RAY EXAM OF HAND: CPT | Performed by: OTHER

## 2018-03-16 PROCEDURE — 99232 SBSQ HOSP IP/OBS MODERATE 35: CPT | Performed by: HOSPITALIST

## 2018-03-16 PROCEDURE — 99233 SBSQ HOSP IP/OBS HIGH 50: CPT | Performed by: OTHER

## 2018-03-16 RX ORDER — HYDRALAZINE HYDROCHLORIDE 20 MG/ML
5 INJECTION INTRAMUSCULAR; INTRAVENOUS EVERY 6 HOURS PRN
Status: DISCONTINUED | OUTPATIENT
Start: 2018-03-16 | End: 2018-03-21

## 2018-03-16 RX ORDER — ARIPIPRAZOLE 15 MG/1
40 TABLET ORAL ONCE
Status: COMPLETED | OUTPATIENT
Start: 2018-03-16 | End: 2018-03-16

## 2018-03-16 NOTE — PHYSICAL THERAPY NOTE
PHYSICAL THERAPY TREATMENT NOTE - INPATIENT    Room Number: 7713/0115-J     Session: 3  Number of Visits to Meet Established Goals: 5    Presenting Problem: acute R MCA    History related to current admission: admitted with weakness and dizziness, L facia How much help from another person does the patient currently need. ..   -   Moving to and from a bed to a chair (including a wheelchair)?: Total   -   Need to walk in hospital room?: Total   -   Climbing 3-5 steps with a railing?: Total       AM-PAC Score achieve highest functional independence/return to baseline. Recommend AR upon [facility] d/c. Recommend PRAFO for L ankle secondary to foot drop. Recommend Oksana lift for safe transfers at this time.      DISCHARGE RECOMMENDATIONS  PT Discharge Re

## 2018-03-16 NOTE — PROGRESS NOTES
· [facility] Card[facility] Progress Note     Subjective:  Very sleepy today. Does not want to take Aldactone or Crestor. Agreeable to [facility] but not today.    Daughter at bedside    Objective:  -158  AFib 80-90s   Afebrile   I/O - 520     K  3.7      N

## 2018-03-16 NOTE — PROGRESS NOTES
[facility]  Progress Note     Laila Marianna Patient Status:  Inpatient    1945 MRN [id number]   [facility] 6NE-A Attending Mis Lorenzo MD   Hosp Day # 5 PCP No primary care provider on file.      Chief Complaint: weakness, f Units Subcutaneous Daily   • Losartan Potassium  50 mg Oral BID   • spironolactone  25 mg Oral Daily   • sodium chloride 0.9%  250 mL Intravenous Once   • aspirin  81 mg Oral Daily   • Clopidogrel Bisulfate  75 mg Oral Daily   • famoTIDine  20 mg Oral BID

## 2018-03-16 NOTE — PLAN OF CARE
Assumed care at 1. Pt A/O x4. RA. NSR on tele. SBP is better in the 140s. VSS. Pt having a hard time voiding. Bladder scan completed revealing 510ml. Straight cath completed, 1000 ml of clear yellow urine returned. Pt is anxious.    Tramadol given per

## 2018-03-16 NOTE — PROGRESS NOTES
[id number] [facility] Progress Note    Juan M Stewart Patient Status:  Inpatient    1945 MRN [id number]   [facility] 7NE-A Attending Juanjose Mckeon MD   Hosp Day # 5 PCP No primary care provider on file.      CC: Left sided w than 50%    3/11/2018 Echocardiogram  Severe left ventricular hypertrophy with good LV function  Decreased LV compliance    Assessment/Plan:  · CVA  · Ischemic Stroke Order Set  · ASA 81mg and Plavix 75mg daily  · ; Crestor 20mg q HS ordered, but pa 81 mg Oral Daily   • Clopidogrel Bisulfate  75 mg Oral Daily   • famoTIDine  20 mg Oral BID    Or   • famoTIDine  20 mg Intravenous BID   • Rosuvastatin Calcium  20 mg Oral Nightly   • Labetalol HCl  100 mg Oral 2 times per day   • Senna  17.2 mg Oral Nig

## 2018-03-16 NOTE — PROGRESS NOTES
[facility]  Progress Note     Otilio Lyons Patient Status:  Inpatient    1945 MRN [id number]   [facility] 6NE-A Attending Adriana Huynh MD   Hosp Day # 5 PCP No primary care provider on file.      Chief Complaint: weakness, f Bisulfate  75 mg Oral Daily   • famoTIDine  20 mg Oral BID    Or   • famoTIDine  20 mg Intravenous BID   • Rosuvastatin Calcium  20 mg Oral Nightly   • Labetalol HCl  100 mg Oral 2 times per day   • Senna  17.2 mg Oral Nightly   • docusate sodium  100 mg O

## 2018-03-16 NOTE — SLP NOTE
Patient does not require further testing of cognitive/communicative skills as patient seen on 3/13 for assessment with following results:  Pt's cognitive communication abilities evaluated using the SLUMS Examination, and clinical observation.  Pt scored 29/

## 2018-03-16 NOTE — CM/SW NOTE
Pt was re-evaluated for acute rehab by Dr John Donato and found to [facility] be appropriate for acute rehab. Pt will be ready for d/c maybe 1-2 days. Spoke with pt's dtr Celia who is here from [facility] about the recommendation. She will be with pt until next  Friday.

## 2018-03-16 NOTE — OCCUPATIONAL THERAPY NOTE
OCCUPATIONAL THERAPY TREATMENT NOTE - INPATIENT     Room Number: 0854/7337-C  Session: 3   Number of Visits to Meet Established Goals: 7    Presenting Problem: acute CVA    History related to current admission: Pt with L side weakness.   In the ED pt with s Stand: Not tested    Skilled Therapy Provided: Per pt, reporting increased neck tightness from being up in the chair yesterday morning. Recliner chair not available on the floor. Informed the RN to inquire about additional recliner chairs for the floor. training;Patient/Family education;Patient/Family training;Neuromuscluar reeducation; Compensatory technique education;Continued evaluation  Rehab Potential : Good  Frequency (Obs): Daily        OT Goals:  ALL GOALS PROGRESSING AS OF 3/16  ADL Goals   Patient

## 2018-03-16 NOTE — PROGRESS NOTES
SUBJECTIVE: No pain/seizures    CC: R MCA CVA with L hai  Interval History: On reg/thins. Echo   -  Severe marked concentric left ventricular hypertrophy with good LV function. Brief atrial fib.  May need LINQ at dc  Scheduled Meds:  • insulin detemir  8 Intake              480 ml   Output             1000 ml   Net             -520 ml     Physical Therapy: Max A for transfers  Occupational Therapy:   LUNG: normal Breath Sounds, no wheezes, no rhonchi, no tachypnea.   CARDIOVASCULAR: Regular rate rhythm, D

## 2018-03-16 NOTE — PLAN OF CARE
Assumed care at 0730  A&Ox4, VSS, NSR on tele  BP within goal parameters. Refusing aldactone. PRN hydralazine given once  10 beats vtach, asymptomatic.  Cardiology notified  Encouraged sitting up in chair, refused d/t unavailable recliner  Voiding freely  R

## 2018-03-17 LAB
GLUCOSE BLD-MCNC: 142 MG/DL (ref 65–99)
GLUCOSE BLD-MCNC: 178 MG/DL (ref 65–99)
GLUCOSE BLD-MCNC: 193 MG/DL (ref 65–99)
GLUCOSE BLD-MCNC: 239 MG/DL (ref 65–99)
POTASSIUM SERPL-SCNC: 3.8 MMOL/L (ref 3.6–5.1)

## 2018-03-17 PROCEDURE — 99233 SBSQ HOSP IP/OBS HIGH 50: CPT | Performed by: OTHER

## 2018-03-17 PROCEDURE — 99232 SBSQ HOSP IP/OBS MODERATE 35: CPT | Performed by: HOSPITALIST

## 2018-03-17 PROCEDURE — 99232 SBSQ HOSP IP/OBS MODERATE 35: CPT | Performed by: OTHER

## 2018-03-17 RX ORDER — LIDOCAINE 50 MG/G
1 PATCH TOPICAL DAILY
Status: DISCONTINUED | OUTPATIENT
Start: 2018-03-17 | End: 2018-03-21

## 2018-03-17 NOTE — PROGRESS NOTES
Pt's daughter wants to know if anybody checked pt's BG. This author accessed pt chart to check if BG result available.

## 2018-03-17 NOTE — PLAN OF CARE
APN made aware of elevated SBP outside parameters since this am and unwillingness to take prn medications despite education on risks.

## 2018-03-17 NOTE — PROGRESS NOTES
[id number] [facility] Progress Note    Keisha Hill Patient Status:  Inpatient    1945 MRN [id number]   [facility] 7NE-A Attending Mike Suarez MD   Hosp Day # 6 PCP No primary care provider on file.          Subjective: CTA Brain and Carotids  No evidence of aneurysm or dissection  Narrowing of the ICAs bilaterally of less than 50%     3/11/2018 Echocardiogram  Severe left ventricular hypertrophy with good LV function  Decreased LV compliance    • insulin detemir  8 Units agreeable for linq device -plan for Monday then likely dc to [facility]     Continue above plan    JB Kim  [facility]  3/17/2018  7:38 AM  Spectra [id number]    NEUROLOGY Attending notes:    Above reviewed and patient independently seen MD  Vascular & General Neurology  [facility]  3/17/2018, 11:25 AM    Decision making:         (   ) other records reviewed -1  (   ) labs reviewed/ordered - 1  (   ) new diagnosis: - 2  (   ) Images independently reviewed -2  (   ) Case/

## 2018-03-17 NOTE — PLAN OF CARE
No appreciable changed noted in neuro assessment compared to those earlier in day. Accepted all scheduled medications except rovustatin. Warm pack and tylenol (only 325mg per pt) for R neck pain. Incontinent of urine.   No retention of urine on bladder s

## 2018-03-17 NOTE — PROGRESS NOTES
[facility]  Progress Note     Abhay Payment Patient Status:  Inpatient    1945 MRN [id number]   [facility] 6NE-A Attending Tyrese Trinidad MD   Hosp Day # 6 PCP No primary care provider on file.      Chief Complaint: weakness, f Daily   • Clopidogrel Bisulfate  75 mg Oral Daily   • famoTIDine  20 mg Oral BID    Or   • famoTIDine  20 mg Intravenous BID   • Rosuvastatin Calcium  20 mg Oral Nightly   • Labetalol HCl  100 mg Oral 2 times per day   • Senna  17.2 mg Oral Nightly   • doc

## 2018-03-17 NOTE — PLAN OF CARE
Patient has high BP still this evening, but is comfortable with where it is and is refusing any as needed medication for it.

## 2018-03-17 NOTE — PROGRESS NOTES
SUBJECTIVE:  Left arm and leg weakness with decreased balance and inability ambulate. No chest pain or shortness of breath.   She is more calm about her decision for acute rehab  CC: Left arm and leg weakness  Interval History:  Dense left hemiparesis from (!) 185/84 98.7 °F (37.1 °C) Oral 99 27 100 %   03/16/18 1815 (!) 168/68 - - 99 - -   03/16/18 1800 - - - 98 14 -   03/16/18 1700 (!) 193/78 98.1 °F (36.7 °C) Oral 95 13 97 %         Intake/Output:  {I/O choices:213Physical Therapy:  Total assist for all mo

## 2018-03-17 NOTE — PROGRESS NOTES
[facility]  Report of Psychiatric Progress Note    Margarito Pang Patient Status:  Inpatient    1945 MRN [id number]   [facility] [place] 7NE-A Attending Osbaldo Kim MD   Hosp Day # 5 PCP No primary care provider on file.      Date of Avoidant-fearful, narcissistic-others are not good enough, and paranoid-distrustful personality traits      AXIS 3: HTN emergency, acute R thalamic and L occipital CVA, NSTEMI, DM2     Recommendations:  1) Continue Ativan 0.5mg three times a day as needed the her daughters and the [employer], she thinks she knows what is going on medically, but her reasoning is irrational at times.  She was so concerned about her BP dropping too low, that she refused BP meds even when her SBP was > 200 which just increased use     Psych Family History: Daughter has anxiety disorder. Another daughter has depression.      Social and Developmental History:   . Has 2 daughters. 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 She lives alone.  Has a reconstructionist mg, Intravenous, Q2H PRN  •  phenylephrine in NaCl (MATTHEW-SYNEPHRINE) 50 mg/250 ml premix infusion SOLN, 100-200 mcg/min, Intravenous, Continuous PRN  •  Senna (SENOKOT) tab TABS 17.2 mg, 17.2 mg, Oral, Nightly  •  docusate sodium (COLACE) cap 100 mg, 100 mg rate, rhythm and volume     Mood: depressed and anxious and irritable  Affect: congruent     Thought process: logical  Thought content: ruminations  Perceptions: no hallucinations  Associations: Intact     Orientation: alert and Oriented person, place, mikie

## 2018-03-17 NOTE — PROGRESS NOTES
· [facility] Progress Note     Subjective:  Feels better today, still with left hemiparesis    Objective:  SBP  160-190  SR no AFib   Afebrile   I/O incomplete     K  3.8      Neuro: left hemiparesis  HEENT: left facial droop  Cardiac: S1 S2 r

## 2018-03-18 LAB
GLUCOSE BLD-MCNC: 155 MG/DL (ref 65–99)
GLUCOSE BLD-MCNC: 180 MG/DL (ref 65–99)
GLUCOSE BLD-MCNC: 203 MG/DL (ref 65–99)
GLUCOSE BLD-MCNC: 226 MG/DL (ref 65–99)

## 2018-03-18 PROCEDURE — 99232 SBSQ HOSP IP/OBS MODERATE 35: CPT | Performed by: HOSPITALIST

## 2018-03-18 PROCEDURE — 99232 SBSQ HOSP IP/OBS MODERATE 35: CPT | Performed by: OTHER

## 2018-03-18 NOTE — PROGRESS NOTES
[facility]  Progress Note     Dianna Rusabiel Patient Status:  Inpatient    1945 MRN [id number]   [facility] 6NE-A Attending Eusebio Troncoso MD   Hosp Day # 7 PCP No primary care provider on file.      Chief Complaint: weakness, f mg Oral Daily   • Clopidogrel Bisulfate  75 mg Oral Daily   • famoTIDine  20 mg Oral BID    Or   • famoTIDine  20 mg Intravenous BID   • Rosuvastatin Calcium  20 mg Oral Nightly   • Labetalol HCl  100 mg Oral 2 times per day   • Senna  17.2 mg Oral Nightly

## 2018-03-18 NOTE — PLAN OF CARE
CARDIOVASCULAR - ADULT    • Maintains optimal cardiac output and hemodynamic stability Progressing          NEUROLOGICAL - ADULT    • Achieves stable or improved neurological status Progressing    • Achieves maximal functionality and self care Progressing

## 2018-03-18 NOTE — PROGRESS NOTES
[id number] Kathy Goodman Neurology Progress Note    Keisha Hill Patient Status:  Inpatient    1945 MRN [id number]   [facility] 7NE-A Attending Mike Suarez MD   Hosp Day # 7 PCP No primary care provider on file.          Subjective: Brain  Acute infarct involving the right thalamus and left occipital cortex.     3/11/2018 CTA Brain and Carotids  No evidence of aneurysm or dissection  Narrowing of the ICAs bilaterally of less than 50%     3/11/2018 Echocardiogram  Severe left ventricul Patient feels her speech was slow and difficult when her BP was in the 130's, currently SBP in the 180's and she is feeling better and feels that is where her BP should be    Plan for linq tomorrow per cardiology     JB Jara workup for now        Malini Henley MD  Vascular & General Neurology  [facility]  3/18/2018, 12:48 PM    Decision making:         (   ) other records reviewed -1  (   ) labs reviewed/ordered - 1  (   ) new diagnosis: - 2  (   ) Images

## 2018-03-18 NOTE — PLAN OF CARE
Better BP control, with latest SBP 150s. Accepted all medications except senokot and crestor. Less complaints of neck pain. No change to neuro assessments.         CARDIOVASCULAR - ADULT    • Maintains optimal cardiac output and hemodynamic stability Pro

## 2018-03-18 NOTE — PROGRESS NOTES
[facility]  Progress Note    Jocy Adinsyed Patient Status:  Inpatient    1945 MRN [id number]   [facility] 7NE-A Attending Johnson Dean MD   Hosp Day # 7 PCP No primary care provider on file.        Assessment and Plan:  Mario %.  General: Alert and oriented in no apparent distress. Neck: No JVD  Cardiac: Regular rate and rhythm, S1, S2 normal  Lungs: Clear  Abdomen: Soft, non-tender. Extremities: no edema  Neurologic: left sided hemiparesis  Skin: Warm and dry.      Laborator Oral Daily PRN   bisacodyl (DULCOLAX) rectal suppository 10 mg 10 mg Rectal Daily PRN   FLEET ENEMA (FLEET) 7-19 GM/118ML enema 133 mL 1 enema Rectal Once PRN   ondansetron HCl (ZOFRAN) injection 4 mg 4 mg Intravenous Q6H PRN   Or      Metoclopramide HCl (

## 2018-03-19 LAB
GLUCOSE BLD-MCNC: 143 MG/DL (ref 65–99)
GLUCOSE BLD-MCNC: 160 MG/DL (ref 65–99)
GLUCOSE BLD-MCNC: 240 MG/DL (ref 65–99)
GLUCOSE BLD-MCNC: 281 MG/DL (ref 65–99)

## 2018-03-19 PROCEDURE — 99232 SBSQ HOSP IP/OBS MODERATE 35: CPT | Performed by: HOSPITALIST

## 2018-03-19 PROCEDURE — 99233 SBSQ HOSP IP/OBS HIGH 50: CPT | Performed by: OTHER

## 2018-03-19 RX ORDER — LABETALOL 100 MG/1
100 TABLET, FILM COATED ORAL EVERY 8 HOURS SCHEDULED
Status: DISCONTINUED | OUTPATIENT
Start: 2018-03-19 | End: 2018-03-21

## 2018-03-19 RX ORDER — CLOPIDOGREL BISULFATE 75 MG/1
75 TABLET ORAL DAILY
Qty: 30 TABLET | Refills: 0 | Status: SHIPPED | OUTPATIENT
Start: 2018-03-20 | End: 2019-01-01

## 2018-03-19 RX ORDER — LIDOCAINE 50 MG/G
1 PATCH TOPICAL DAILY
Qty: 15 PATCH | Refills: 0 | Status: SHIPPED | OUTPATIENT
Start: 2018-03-20 | End: 2018-04-06

## 2018-03-19 RX ORDER — ROSUVASTATIN CALCIUM 20 MG/1
20 TABLET, COATED ORAL NIGHTLY
Qty: 30 TABLET | Refills: 0 | Status: SHIPPED | OUTPATIENT
Start: 2018-03-19 | End: 2018-04-06

## 2018-03-19 RX ORDER — POLYETHYLENE GLYCOL 3350 17 G/17G
17 POWDER, FOR SOLUTION ORAL DAILY PRN
Qty: 1 G | Refills: 0 | Status: SHIPPED | OUTPATIENT
Start: 2018-03-19

## 2018-03-19 RX ORDER — PSEUDOEPHEDRINE HCL 30 MG
100 TABLET ORAL 2 TIMES DAILY
Qty: 60 CAPSULE | Refills: 0 | Status: ON HOLD | OUTPATIENT
Start: 2018-03-19 | End: 2020-01-01

## 2018-03-19 NOTE — PROGRESS NOTES
[facility]  Progress Note     Opal Burrows Patient Status:  Inpatient    1945 MRN [id number]   [facility] 6NE-A Attending Bette Solis MD   Hosp Day # 8 PCP No primary care provider on file.      Chief Complaint: weakness, f • famoTIDine  20 mg Oral BID    Or   • famoTIDine  20 mg Intravenous BID   • Rosuvastatin Calcium  20 mg Oral Nightly   • Labetalol HCl  100 mg Oral 2 times per day   • Senna  17.2 mg Oral Nightly   • docusate sodium  100 mg Oral BID   • enoxaparin  40 m

## 2018-03-19 NOTE — CM/SW NOTE
Pt medically cleared for d/c; however, pt's blood pressure is still very high. Monserrat Norwood at [facility] is aware of this and she not sure they can accept her with a blood pressure over 200.   She spoke with Dr Nova Wen who says they are willing to accept pt with her BP wi

## 2018-03-19 NOTE — PROGRESS NOTES
[facility]  Progress Note    Margarito Pang Patient Status:  Inpatient    1945 MRN [id number]   [facility] 7NE-A Attending Osbaldo Kim MD   Hosp Day # 8 PCP No primary care provider on file. Assessment:  1.   Acute CVA - i without wheezes, rales, rhonchi. Normal excursions and effort. Abdomen: Soft, non-tender. Extremities: Without clubbing, cyanosis or edema. Peripheral pulses are 2+. Skin: Warm and dry.      Labs:       Lab Results  Component Value Date   INR 1.11 03/

## 2018-03-19 NOTE — PHYSICAL THERAPY NOTE
Attempted to see pt for PT treatment this PM; however elevated BP at this time per RN. Will f/u again either later today or tomorrow pending BP management.

## 2018-03-19 NOTE — PLAN OF CARE
BP within parameters with SBP 150s-160s. Pt stating she feels better with these BP compared to when lower. Took all scheduled medications except for statin. Neck pain improved rating it 3/10. Pt concerned needed supplemental NC O2.   No desaturation

## 2018-03-19 NOTE — PROGRESS NOTES
[id number] Kathy Goodman Neurology Progress Note    Osiris Gallegos Patient Status:  Inpatient    1945 MRN [id number]   [facility] 7NE-A Attending Nita Amezcua MD   Hosp Day # 8 PCP No primary care provider on file.          Subjective: Cont ASA 81 mg and Plavix 75 mg daily  Refusing statin   PT/OT recommends AR  Accepted for MJ  BP goal 140-180  Cardiology and Psych following, appreciate recs  Ok for Holter instead of LINQ (discussed with Dr. Glasgow Gareth)    Dr. Glasgow Gareth to follow.     Aasheet

## 2018-03-19 NOTE — OCCUPATIONAL THERAPY NOTE
Attempted to see pt for OT tx; however BP very high at this time per RN. Will f/u again either later today or tomorrow pending BP. RN in agreement.

## 2018-03-20 ENCOUNTER — PRIOR ORIGINAL RECORDS (OUTPATIENT)
Dept: OTHER | Age: 73
End: 2018-03-20

## 2018-03-20 ENCOUNTER — APPOINTMENT (OUTPATIENT)
Dept: CT IMAGING | Facility: HOSPITAL | Age: 73
DRG: 040 | End: 2018-03-20
Attending: HOSPITALIST
Payer: MEDICARE

## 2018-03-20 LAB
GLUCOSE BLD-MCNC: 133 MG/DL (ref 65–99)
GLUCOSE BLD-MCNC: 161 MG/DL (ref 65–99)
GLUCOSE BLD-MCNC: 180 MG/DL (ref 65–99)
GLUCOSE BLD-MCNC: 187 MG/DL (ref 65–99)

## 2018-03-20 PROCEDURE — 99232 SBSQ HOSP IP/OBS MODERATE 35: CPT | Performed by: OTHER

## 2018-03-20 PROCEDURE — 0JH632Z INSERTION OF MONITORING DEVICE INTO CHEST SUBCUTANEOUS TISSUE AND FASCIA, PERCUTANEOUS APPROACH: ICD-10-PCS | Performed by: INTERNAL MEDICINE

## 2018-03-20 PROCEDURE — 70450 CT HEAD/BRAIN W/O DYE: CPT | Performed by: HOSPITALIST

## 2018-03-20 PROCEDURE — 99232 SBSQ HOSP IP/OBS MODERATE 35: CPT | Performed by: HOSPITALIST

## 2018-03-20 RX ORDER — LIDOCAINE HYDROCHLORIDE AND EPINEPHRINE 15; 5 MG/ML; UG/ML
INJECTION, SOLUTION EPIDURAL
Status: COMPLETED
Start: 2018-03-20 | End: 2018-03-20

## 2018-03-20 NOTE — PLAN OF CARE
Pt alert and oriented x 4, breathing unlabored on room air. Pt's BP remains elevated, informed JB Frost regarding elevated BP this morning /66 and repeat BP after meds 193/88, Corry Dawson will give msg to JB Gray.    Per David Row will not take pt unless pt

## 2018-03-20 NOTE — OCCUPATIONAL THERAPY NOTE
Attempted to see the patient for OT session. . Per chart and RN, parameter is to maintain SBP below 190. OT will work with the patient once her BP is better controlled.

## 2018-03-20 NOTE — PROCEDURES
OPERATION(S) PERFORMED:   1. [employer] LINQ Reveal loop recorder implant    : Piotr Hoffman MD    INDICATION: 80 y/o female with uncontrolled hTN and DM presetns with CVA. MRI shows 2 territory infarcts.   Presents for [facility] implant  COMPLICATIONS: No

## 2018-03-20 NOTE — PROGRESS NOTES
[facility]  Report of Psychiatric Progress Note    Olegario Cogan Patient Status:  Inpatient    1945 MRN [id number]   [facility] 7NE-A Attending Omaira Cooley MD   Hosp Day # 9 PCP No primary care provider on file.      Date of disorder unspecified. Rule out generalized anxiety disorder. Adjustment disorder with depressed mood.     AXIS 2:  Avoidant/narcissistic/paranoid personality traits      AXIS 3: HTN emergency, acute R thalamic and L occipital CVA, NSTEMI, DM2     Recommenda to estimate weighted mean differences (WMDs) and relative risks (RRs) with 95% confidence intervals (CIs) according to heterogeneity. RESULTS:  Eight trials were included, with 1549 patients.  Compared with placebo, decrease in NIHSS was greater in SSRI- times. She was so concerned about her BP dropping too low, that she refused BP meds even when her SBP was > 200 which just increased her risk for another stroke. Regarding her diabetes, she knew that too much sugar was bad.  When she heard that artifical sw has fatigue and anhedonia, but wants to get better and go to rehab. Psychiatry Review Systems: No voices or visions. No elevated mood/increased energy/grandiosity. No hx of trauma. No binding/purging/food restriction.  No hx panic attacks.      Past Psy tab 20 mg, 20 mg, Oral, BID **OR** famoTIDine (PEPCID) injection 20 mg, 20 mg, Intravenous, BID  •  Rosuvastatin Calcium (CRESTOR) 20 MG tab 20 mg, 20 mg, Oral, Nightly  •  acetaminophen (TYLENOL) tab 650 mg, 650 mg, Oral, Q4H PRN **OR** acetaminophen (TYL voices or visions. No elevated mood/increased energy/grandiosity. No hx of trauma. No binding/purging/food restriction. No hx panic attacks.      Mental Status Exam:     Objective       03/20/18  1143   BP: (!) 175/67   Pulse: 64   Resp: 17   Temp: 98.2 °F and left occipital cortex as detailed above.

## 2018-03-20 NOTE — PHYSICAL THERAPY NOTE
Attempted to see patient this AM.  BP prior to initiating PT was 206/74 PA & RN aware.   Will reattempt PT services as appropriate pending BP management

## 2018-03-20 NOTE — PROGRESS NOTES
[facility]  Progress Note     Minus Travis Patient Status:  Inpatient    1945 MRN [id number]   [facility] 6NE-A Attending Sonny Correia MD   Hosp Day # 9 PCP No primary care provider on file.      Chief Complaint: weakness, f Oral BID   • enoxaparin  40 mg Subcutaneous Daily       ASSESSMENT / PLAN:     1. Hypertensive Emergency, resolved   1. BP control augmented by patient preference   2. labetalol dose increased tid  3. Cozaar 50 mg bid    4.  Needs better bp control as thera

## 2018-03-20 NOTE — OCCUPATIONAL THERAPY NOTE
Attempted to see the pt again, but she is out of the room. Will attempt to work with the pt tomorrow.

## 2018-03-20 NOTE — PROGRESS NOTES
[id number] Kathy Goodman Neurology Progress Note    Latasha Schirmer Patient Status:  Inpatient    1945 MRN [id number]   [facility] 7NE-A Attending Johnson Gaviria MD   Hosp Day # 9 PCP No primary care provider on file.          Subjective: Continues to refuse statin   GI prophylaxis, Pepcid  DVT prophylaxis, Lovenox  PT/OT recommends AR   BP goal 140-180 per Neurology, Cardiology has given 160-190 and pt unwilling to follow recommendations other than from Dr. Hernán Morton  Cardiology and Psych fo

## 2018-03-20 NOTE — PHYSICAL THERAPY NOTE
Second attempt to see Pt 3/20/2018 for PT session - Pt currently off floor for LINQ per chart.       Will f/u tomorrow 3/21/18

## 2018-03-20 NOTE — PLAN OF CARE
Assumed care at [address]. Pt a/ox4. R side flaccid. VSS, nsr per tele. RA. Meds given per MAR. Denies pain. Needs attended to. Call light w/in reach. Pt agreed for [facility] CARE monitor, will notify md. TRAYLOR to BOLIVAR when med cleared.   CARDIOVASCULAR - ADULT    • Rosie

## 2018-03-21 VITALS
OXYGEN SATURATION: 96 % | SYSTOLIC BLOOD PRESSURE: 164 MMHG | BODY MASS INDEX: 29.06 KG/M2 | RESPIRATION RATE: 17 BRPM | HEIGHT: 64 IN | DIASTOLIC BLOOD PRESSURE: 52 MMHG | HEART RATE: 62 BPM | WEIGHT: 170.19 LBS | TEMPERATURE: 98 F

## 2018-03-21 LAB
GLUCOSE BLD-MCNC: 144 MG/DL (ref 65–99)
GLUCOSE BLD-MCNC: 178 MG/DL (ref 65–99)

## 2018-03-21 PROCEDURE — 99232 SBSQ HOSP IP/OBS MODERATE 35: CPT | Performed by: OTHER

## 2018-03-21 PROCEDURE — 99233 SBSQ HOSP IP/OBS HIGH 50: CPT | Performed by: OTHER

## 2018-03-21 PROCEDURE — 99239 HOSP IP/OBS DSCHRG MGMT >30: CPT | Performed by: STUDENT IN AN ORGANIZED HEALTH CARE EDUCATION/TRAINING PROGRAM

## 2018-03-21 RX ORDER — DULOXETIN HYDROCHLORIDE 30 MG/1
30 CAPSULE, DELAYED RELEASE ORAL DAILY
Status: DISCONTINUED | OUTPATIENT
Start: 2018-03-21 | End: 2018-03-21

## 2018-03-21 RX ORDER — LABETALOL 100 MG/1
100 TABLET, FILM COATED ORAL EVERY 8 HOURS SCHEDULED
Qty: 90 TABLET | Refills: 0 | Status: SHIPPED | OUTPATIENT
Start: 2018-03-21 | End: 2019-01-01

## 2018-03-21 RX ORDER — ASPIRIN 81 MG/1
81 TABLET, CHEWABLE ORAL DAILY
Qty: 30 TABLET | Refills: 0 | Status: SHIPPED | OUTPATIENT
Start: 2018-03-22

## 2018-03-21 RX ORDER — LOSARTAN POTASSIUM 50 MG/1
50 TABLET ORAL 2 TIMES DAILY
Qty: 100 TABLET | Refills: 0 | Status: SHIPPED | OUTPATIENT
Start: 2018-03-21 | End: 2019-01-01

## 2018-03-21 RX ORDER — DULOXETIN HYDROCHLORIDE 30 MG/1
30 CAPSULE, DELAYED RELEASE ORAL DAILY
Qty: 30 CAPSULE | Refills: 0 | Status: SHIPPED | OUTPATIENT
Start: 2018-03-21 | End: 2018-12-13

## 2018-03-21 NOTE — PROGRESS NOTES
[facility]  Report of Psychiatric Progress Note    Andrew Side Patient Status:  Inpatient    1945 MRN [id number]   [facility] 7NE-A Attending Emil Crawford MD   Hosp Day # 10 PCP No primary care provider on file.      Date of depressed mood.     AXIS 2:  Avoidant/narcissistic/paranoid personality traits      AXIS 3: HTN emergency, acute R thalamic and L occipital CVA, NSTEMI, DM2     Recommendations:  1) Continue Cymbalta 30mg po daily to help with anxiety/depression and her thu heterogeneity. RESULTS:  Eight trials were included, with 1549 patients. Compared with placebo, decrease in NIHSS was greater in SSRI-treated patients (WMD, 0.82; 95% CI, 0.31-1.33; P?=?.002).  Trial sequential analysis showed that the cumulative z curve increased her risk for another stroke. Regarding her diabetes, she knew that too much sugar was bad.  When she heard that artifical sweeteners could disrupt glucose homeostasis, she switched back to \"normal sugar\" to the detriment of her glucose control ( irritable today. Last night, she had slurred speech so a stat CT was performed and showed no acute bleed. No hopelessness or suicidal ideation. Psychiatry Review Systems: No voices or visions. No elevated mood/increased energy/grandiosity.  No hx of tra Q6H PRN  •  aspirin chewable tab 81 mg, 81 mg, Oral, Daily  •  Clopidogrel Bisulfate (PLAVIX) tab 75 mg, 75 mg, Oral, Daily  •  famoTIDine (PEPCID) tab 20 mg, 20 mg, Oral, BID **OR** famoTIDine (PEPCID) injection 20 mg, 20 mg, Intravenous, BID  •  Rosuvast Psychiatric/Behavioral: Positive for depression. Negative for suicidal ideas. The patient is nervous/anxious. Psychiatry Review Systems: No voices or visions. No elevated mood/increased energy/grandiosity. No hx of trauma.  No binding/purging/food re with volume loss.     =====  CONCLUSION:    1. Limited exam.  Findings consistent with an acute infarct involving the right thalamus and left occipital cortex as detailed above.

## 2018-03-21 NOTE — PHYSICAL THERAPY NOTE
PHYSICAL THERAPY TREATMENT NOTE - INPATIENT    Room Number: 5198/0102-I     Session: 4  Number of Visits to Meet Established Goals: 5    Presenting Problem: acute R MCA    History related to current admission: admitted with weakness and dizziness, L facia with arms (e.g., wheelchair, bedside commode, etc.): Unable   -   Moving from lying on back to sitting on the side of the bed?: A Lot   How much help from another person does the patient currently need. ..   -   Moving to and from a bed to a chair (includin AR upon [facility] d/c. Recommend PRAFO for L ankle secondary to foot drop. Recommend Oksana lift for safe transfers at this time.      DISCHARGE RECOMMENDATIONS  PT Discharge Recommendations: Acute rehabilitation     PLAN  PT Treatment Plan: Bed m

## 2018-03-21 NOTE — OCCUPATIONAL THERAPY NOTE
OCCUPATIONAL THERAPY TREATMENT NOTE - INPATIENT     Room Number: 4572/8415-W  Session: 4   Number of Visits to Meet Established Goals: 7    Presenting Problem: acute CVA    History related to current admission: Pt with L side weakness.   In the ED pt with s CL    FUNCTIONAL TRANSFER ASSESSMENT  Supine to Sit : Not tested  Sit to Stand: Not tested    Skilled Therapy Provided: Pt reported less pain in L thumb, 5 out of 10 with movement today.  Reported no pain at rest.  Educated the pt about use of foam block to functional transfers:  with mod assist-discontinue goal, not appropriate at this time - 3/16     UE Exercise Program Goal  Patient will be minimum assistance with left AAROM HEP (home exercise program).      Additional Goals:  Pt will participate in PHQ-9 d

## 2018-03-21 NOTE — PLAN OF CARE
Assumed care at [address]. Pt a/ox4, left side flaccid. Vss, nsr/SB per tele. RA, O2 @ 2l/nc at night. C/o left thumb pain, denies needs for pain meds. Turned and repositioned per staff. Left chest incision w/ bleeding noted, quik clot in place and reinforced.  Claryce Forth Progressing        NEUROLOGICAL - ADULT    • Achieves stable or improved neurological status Progressing    • Achieves maximal functionality and self care Progressing        PAIN - ADULT    • Verbalizes/displays adequate comfort level or patient's stated p

## 2018-03-21 NOTE — PLAN OF CARE
All meds crushed and taken with apple sauce. Alert and oriented. C/o pain to left hand 10/10. MD's aware. Unwilling to try medications. Some relief with warm and cold packs. Incontinent in brief. LBM 3/15.  Patient states she feels comfortable and would dis

## 2018-03-21 NOTE — PROGRESS NOTES
Asked by RN to evaluate patient as family was concerned of dysarthia post procedure. I examined patient and confirmed essentially same physical exam as documented previously. She does have some occasional dysarthria, thoughno word finding difficulty.   He

## 2018-03-21 NOTE — PROGRESS NOTES
[facility]  Progress Note     Juan F Sutton Patient Status:  Inpatient    1945 MRN [id number]   [facility] 6NE-A Attending Shiela Abbott MD   Hosp Day # 10 PCP No primary care provider on file.      Chief Complaint: weakness, mg Oral Nightly   • docusate sodium  100 mg Oral BID   • enoxaparin  40 mg Subcutaneous Daily       ASSESSMENT / PLAN:     1. Hypertensive Emergency, resolved   1. labetalol dose increased tid  2. Cozaar 50 mg bid    3. Bp much better last 36 hrs   2.  Acut

## 2018-03-21 NOTE — PROGRESS NOTES
[facility] Cardiology Progress Note    Subjective:  Feels tired. Mild tenderness at [facility] site, bleeding last PM.     Objective:  BP (!) 164/52 (BP Location: Right arm)   Pulse 62   Temp 98.1 °F (36.7 °C) (Oral)   Resp 17   Ht 162.6 cm (5' 4\")   Wt 170 lb 3. 1

## 2018-03-21 NOTE — CM/SW NOTE
Pt is ready for d/c today. Pt will go to . Pt will go into room 1179. RN to call report to [phone]. Arranged [facility] to  pt at 5:00pm today. Pt aware of d/c time.

## 2018-03-21 NOTE — PROGRESS NOTES
[id number] Kathy Goodman Neurology Progress Note    Manisha Montez Patient Status:  Inpatient    1945 MRN [id number]   [facility] 7NE-A Attending Gianfranco Bowen MD   Hosp Day # 10 PCP No primary care provider on file.          Subjective: Lipids: , HDL 43, TGs 151  HGBA1C 11.6      Assessment:   Acute R thalamic, L occipital infarcts  HTN  DM  Anxiety     Plan:  Ischemic stroke order set in place, workup completed  Cont ASA 81 mg and Plavix 75 mg daily  Continues to refuse statin   [facility]  3/21/2018

## 2018-03-22 NOTE — PLAN OF CARE
CARDIOVASCULAR - ADULT    • Maintains optimal cardiac output and hemodynamic stability Adequate for Discharge        Diabetes/Glucose Control    • Glucose maintained within prescribed range Adequate for Discharge        GASTROINTESTINAL - ADULT    • Shruthi Savers

## 2018-03-22 NOTE — PLAN OF CARE
NURSING DISCHARGE NOTE    Discharged Rehab facility via Ambulance. Accompanied by Support staff  Belongings Taken by patient/family. VSS. Report given to Keena Genao and Ambulance. Education given to patient.

## 2018-03-22 NOTE — CM/SW NOTE
03/22/18 1000   Discharge disposition   Discharged to: [address] [place] [address]   Name of Facillity/Home Care/Hospice [facility]   Patient is Discharged to a [address] [place] [address] Yes   Discharge transportation [employer]

## 2018-03-22 NOTE — DISCHARGE SUMMARY
[facility]  DISCHARGE SUMMARY     Valeria Leung Patient Status:  Inpatient    1945 MRN [id number]   [facility] 7NE-A Attending No att. providers found   [address] Day # 10 PCP No primary care provider on file.      Date of Admission no LOC, no headache, no chest pain, no tingling/numbness no dysarthria. Does not take any meds.      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     Patient has been seen by psychiatry is been diagnosed with anxiety disorder also adjustment disorder with depressive mood and also avoidance/narcissistic paranoid personality traits-after multiple discussions and consideration patient is agree symptoms  ·     Lab/Test results pending at Discharge:   · Need continued A1c follow-up    Consultants:  • Neurology, cardiology, PT OT speech social work endocrinology    Discharge Medication List:     Discharge Medications      START taking these medicat mouth nightly.    Quantity:  30 tablet  Refills:  0           Where to Get Your Medications      Please  your prescriptions at the location directed by your doctor or nurse    Bring a paper prescription for each of these medications  · aspirin 81 MG    -----------------------------------------------------------------------------------------------  PATIENT DISCHARGE INSTRUCTIONS: See electronic chart    Koko Barrios NP, APN  3/21/2018    Time spent:  > 30 minutes    Agree with above, pt admitted

## 2018-04-02 ENCOUNTER — PRIOR ORIGINAL RECORDS (OUTPATIENT)
Dept: OTHER | Age: 73
End: 2018-04-02

## 2018-04-03 ENCOUNTER — PRIOR ORIGINAL RECORDS (OUTPATIENT)
Dept: OTHER | Age: 73
End: 2018-04-03

## 2018-04-05 ENCOUNTER — PRIOR ORIGINAL RECORDS (OUTPATIENT)
Dept: OTHER | Age: 73
End: 2018-04-05

## 2018-04-06 ENCOUNTER — PRIOR ORIGINAL RECORDS (OUTPATIENT)
Dept: OTHER | Age: 73
End: 2018-04-06

## 2018-04-06 RX ORDER — FAMOTIDINE 20 MG/1
20 TABLET ORAL 2 TIMES DAILY
COMMUNITY
End: 2018-12-13

## 2018-04-06 RX ORDER — ERGOCALCIFEROL 1.25 MG/1
50000 CAPSULE ORAL
COMMUNITY
End: 2019-01-01

## 2018-04-06 RX ORDER — AMLODIPINE BESYLATE 5 MG/1
5 TABLET ORAL DAILY
COMMUNITY
End: 2018-12-13 | Stop reason: DRUGHIGH

## 2018-04-06 RX ORDER — ENOXAPARIN SODIUM 100 MG/ML
40 INJECTION SUBCUTANEOUS NIGHTLY
Status: ON HOLD | COMMUNITY
End: 2018-04-11

## 2018-04-06 RX ORDER — HYDRALAZINE HYDROCHLORIDE 10 MG/1
10 TABLET, FILM COATED ORAL 2 TIMES DAILY PRN
COMMUNITY
End: 2018-12-13

## 2018-04-06 RX ORDER — ATORVASTATIN CALCIUM 80 MG/1
80 TABLET, FILM COATED ORAL NIGHTLY
COMMUNITY
End: 2019-01-01

## 2018-04-06 RX ORDER — MAGNESIUM OXIDE 400 MG (241.3 MG MAGNESIUM) TABLET
400 TABLET DAILY
COMMUNITY
End: 2019-01-01

## 2018-04-06 RX ORDER — BISACODYL 10 MG
10 SUPPOSITORY, RECTAL RECTAL EVERY OTHER DAY
COMMUNITY
End: 2018-12-13

## 2018-04-09 ENCOUNTER — PRIOR ORIGINAL RECORDS (OUTPATIENT)
Dept: OTHER | Age: 73
End: 2018-04-09

## 2018-04-10 ENCOUNTER — HOSPITAL ENCOUNTER (OUTPATIENT)
Dept: INTERVENTIONAL RADIOLOGY/VASCULAR | Facility: HOSPITAL | Age: 73
Discharge: NURSING, REHAB OR LONG-TERM CARE | End: 2018-04-11
Attending: INTERNAL MEDICINE | Admitting: INTERNAL MEDICINE
Payer: MEDICARE

## 2018-04-10 ENCOUNTER — APPOINTMENT (OUTPATIENT)
Dept: GENERAL RADIOLOGY | Facility: HOSPITAL | Age: 73
End: 2018-04-10
Attending: INTERNAL MEDICINE
Payer: MEDICARE

## 2018-04-10 DIAGNOSIS — R00.1 BRADYCARDIA: ICD-10-CM

## 2018-04-10 DIAGNOSIS — I44.30 AV BLOCK: ICD-10-CM

## 2018-04-10 DIAGNOSIS — I10 HTN (HYPERTENSION): ICD-10-CM

## 2018-04-10 PROCEDURE — 82962 GLUCOSE BLOOD TEST: CPT

## 2018-04-10 PROCEDURE — 71045 X-RAY EXAM CHEST 1 VIEW: CPT | Performed by: INTERNAL MEDICINE

## 2018-04-10 PROCEDURE — 33208 INSRT HEART PM ATRIAL & VENT: CPT

## 2018-04-10 PROCEDURE — 87081 CULTURE SCREEN ONLY: CPT | Performed by: INTERNAL MEDICINE

## 2018-04-10 PROCEDURE — 0JPT02Z REMOVAL OF MONITORING DEVICE FROM TRUNK SUBCUTANEOUS TISSUE AND FASCIA, OPEN APPROACH: ICD-10-PCS | Performed by: INTERNAL MEDICINE

## 2018-04-10 PROCEDURE — 02H63JZ INSERTION OF PACEMAKER LEAD INTO RIGHT ATRIUM, PERCUTANEOUS APPROACH: ICD-10-PCS | Performed by: INTERNAL MEDICINE

## 2018-04-10 PROCEDURE — 33284 HC REMOVAL SUBCUT CARDIAC RHYTHM MONITOR: CPT

## 2018-04-10 PROCEDURE — 0JH606Z INSERTION OF PACEMAKER, DUAL CHAMBER INTO CHEST SUBCUTANEOUS TISSUE AND FASCIA, OPEN APPROACH: ICD-10-PCS | Performed by: INTERNAL MEDICINE

## 2018-04-10 PROCEDURE — 02HK3JZ INSERTION OF PACEMAKER LEAD INTO RIGHT VENTRICLE, PERCUTANEOUS APPROACH: ICD-10-PCS | Performed by: INTERNAL MEDICINE

## 2018-04-10 PROCEDURE — 99152 MOD SED SAME PHYS/QHP 5/>YRS: CPT

## 2018-04-10 PROCEDURE — 99153 MOD SED SAME PHYS/QHP EA: CPT

## 2018-04-10 RX ORDER — CEFAZOLIN SODIUM/WATER 2 G/20 ML
SYRINGE (ML) INTRAVENOUS
Status: DISCONTINUED
Start: 2018-04-10 | End: 2018-04-11

## 2018-04-10 RX ORDER — LOSARTAN POTASSIUM 50 MG/1
50 TABLET ORAL 2 TIMES DAILY
Status: DISCONTINUED | OUTPATIENT
Start: 2018-04-10 | End: 2018-04-11

## 2018-04-10 RX ORDER — ONDANSETRON 2 MG/ML
4 INJECTION INTRAMUSCULAR; INTRAVENOUS EVERY 6 HOURS PRN
Status: DISCONTINUED | OUTPATIENT
Start: 2018-04-10 | End: 2018-04-11

## 2018-04-10 RX ORDER — DULOXETIN HYDROCHLORIDE 30 MG/1
30 CAPSULE, DELAYED RELEASE ORAL EVERY EVENING
Status: DISCONTINUED | OUTPATIENT
Start: 2018-04-10 | End: 2018-04-11

## 2018-04-10 RX ORDER — SODIUM CHLORIDE 9 MG/ML
INJECTION, SOLUTION INTRAVENOUS CONTINUOUS
Status: DISCONTINUED | OUTPATIENT
Start: 2018-04-10 | End: 2018-04-11

## 2018-04-10 RX ORDER — ATORVASTATIN CALCIUM 80 MG/1
80 TABLET, FILM COATED ORAL NIGHTLY
Status: DISCONTINUED | OUTPATIENT
Start: 2018-04-10 | End: 2018-04-11

## 2018-04-10 RX ORDER — HYDRALAZINE HYDROCHLORIDE 25 MG/1
25 TABLET, FILM COATED ORAL EVERY 8 HOURS SCHEDULED
Status: DISCONTINUED | OUTPATIENT
Start: 2018-04-10 | End: 2018-04-11

## 2018-04-10 RX ORDER — MIDAZOLAM HYDROCHLORIDE 1 MG/ML
INJECTION INTRAMUSCULAR; INTRAVENOUS
Status: COMPLETED
Start: 2018-04-10 | End: 2018-04-10

## 2018-04-10 RX ORDER — CEFAZOLIN SODIUM/WATER 2 G/20 ML
2 SYRINGE (ML) INTRAVENOUS
Status: DISCONTINUED | OUTPATIENT
Start: 2018-04-10 | End: 2018-04-10 | Stop reason: HOSPADM

## 2018-04-10 RX ORDER — ACETAMINOPHEN 325 MG/1
650 TABLET ORAL EVERY 6 HOURS PRN
Status: DISCONTINUED | OUTPATIENT
Start: 2018-04-10 | End: 2018-04-11

## 2018-04-10 RX ORDER — LIDOCAINE HYDROCHLORIDE 10 MG/ML
INJECTION, SOLUTION INFILTRATION; PERINEURAL
Status: DISCONTINUED
Start: 2018-04-10 | End: 2018-04-10 | Stop reason: WASHOUT

## 2018-04-10 RX ORDER — MAGNESIUM OXIDE 400 MG (241.3 MG MAGNESIUM) TABLET
400 TABLET NIGHTLY
Status: DISCONTINUED | OUTPATIENT
Start: 2018-04-10 | End: 2018-04-11

## 2018-04-10 RX ORDER — ASPIRIN 81 MG/1
81 TABLET, CHEWABLE ORAL DAILY
Status: DISCONTINUED | OUTPATIENT
Start: 2018-04-10 | End: 2018-04-11

## 2018-04-10 RX ORDER — DEXTROSE MONOHYDRATE 25 G/50ML
50 INJECTION, SOLUTION INTRAVENOUS
Status: DISCONTINUED | OUTPATIENT
Start: 2018-04-10 | End: 2018-04-11

## 2018-04-10 RX ORDER — BACITRACIN 50000 [USP'U]/1
INJECTION, POWDER, LYOPHILIZED, FOR SOLUTION INTRAMUSCULAR
Status: COMPLETED
Start: 2018-04-10 | End: 2018-04-10

## 2018-04-10 RX ORDER — LABETALOL HYDROCHLORIDE 5 MG/ML
10 INJECTION, SOLUTION INTRAVENOUS EVERY 4 HOURS PRN
Status: DISCONTINUED | OUTPATIENT
Start: 2018-04-10 | End: 2018-04-11

## 2018-04-10 RX ORDER — AMLODIPINE BESYLATE 5 MG/1
5 TABLET ORAL DAILY
Status: DISCONTINUED | OUTPATIENT
Start: 2018-04-10 | End: 2018-04-11

## 2018-04-10 RX ORDER — CHLORHEXIDINE GLUCONATE 4 G/100ML
30 SOLUTION TOPICAL
Status: DISCONTINUED | OUTPATIENT
Start: 2018-04-11 | End: 2018-04-10 | Stop reason: HOSPADM

## 2018-04-10 RX ORDER — LABETALOL 100 MG/1
100 TABLET, FILM COATED ORAL EVERY 8 HOURS SCHEDULED
Status: DISCONTINUED | OUTPATIENT
Start: 2018-04-10 | End: 2018-04-11

## 2018-04-10 RX ORDER — CEFAZOLIN SODIUM/WATER 2 G/20 ML
SYRINGE (ML) INTRAVENOUS
Status: COMPLETED
Start: 2018-04-10 | End: 2018-04-11

## 2018-04-10 RX ORDER — DOCUSATE SODIUM 100 MG/1
100 CAPSULE, LIQUID FILLED ORAL 2 TIMES DAILY
Status: DISCONTINUED | OUTPATIENT
Start: 2018-04-10 | End: 2018-04-11

## 2018-04-10 RX ORDER — LIDOCAINE HYDROCHLORIDE 10 MG/ML
INJECTION, SOLUTION INFILTRATION; PERINEURAL
Status: COMPLETED
Start: 2018-04-10 | End: 2018-04-10

## 2018-04-10 RX ORDER — CEFAZOLIN SODIUM/WATER 2 G/20 ML
2 SYRINGE (ML) INTRAVENOUS EVERY 8 HOURS
Status: DISCONTINUED | OUTPATIENT
Start: 2018-04-11 | End: 2018-04-11

## 2018-04-10 RX ORDER — DIPHENHYDRAMINE HYDROCHLORIDE 50 MG/ML
INJECTION INTRAMUSCULAR; INTRAVENOUS
Status: COMPLETED
Start: 2018-04-10 | End: 2018-04-10

## 2018-04-10 RX ADMIN — ACETAMINOPHEN 650 MG: 325 TABLET ORAL at 23:56:00

## 2018-04-10 RX ADMIN — LABETALOL HYDROCHLORIDE 10 MG: 5 INJECTION, SOLUTION INTRAVENOUS at 17:42:00

## 2018-04-10 RX ADMIN — DOCUSATE SODIUM 100 MG: 100 CAPSULE, LIQUID FILLED ORAL at 21:08:00

## 2018-04-10 RX ADMIN — LABETALOL 100 MG: 100 TABLET, FILM COATED ORAL at 21:08:00

## 2018-04-10 RX ADMIN — LOSARTAN POTASSIUM 50 MG: 50 TABLET ORAL at 21:08:00

## 2018-04-10 RX ADMIN — SODIUM CHLORIDE: 9 INJECTION, SOLUTION INTRAVENOUS at 14:30:00

## 2018-04-10 RX ADMIN — MAGNESIUM OXIDE 400 MG (241.3 MG MAGNESIUM) TABLET 400 MG: TABLET at 21:41:00

## 2018-04-10 RX ADMIN — CEFAZOLIN SODIUM/WATER 2 G: 2 G/20 ML SYRINGE (ML) INTRAVENOUS at 23:57:00

## 2018-04-10 RX ADMIN — DULOXETIN HYDROCHLORIDE 30 MG: 30 CAPSULE, DELAYED RELEASE ORAL at 21:09:00

## 2018-04-10 NOTE — PROCEDURES
OPERATION(S) PERFORMED:   1. [employer] LINQ Reveal loop recorder removal    : Nelly Das MD    INDICATION: 82 y/o female with HTN and DM s/p recent CVA and LINQ implant. LINQ showed high grade AVB and underwent pacer implant.   Presents for pacer

## 2018-04-10 NOTE — PROGRESS NOTES
NURSING ADMISSION NOTE      Patient admitted via Cart  Oriented to room. Safety precautions initiated. Bed in low position. Call light in reach.       Pt arrived from cath lab approx 1730 this evening s/p PPM insertion and LINQ removal; PPM insertion

## 2018-04-10 NOTE — H&P
[facility]  H&P    Sydney Loredo Patient Status:  Outpatient in a Bed    1945 MRN [id number]   Location [address] [facility] Attending Raphael Lynn MD   Hosp Day # 0 PCP No primary care provide resp. rate 18, height 5' 4\" (1.626 m), weight 162 lb 5 oz (73.6 kg), SpO2 97 %.   Temp (24hrs), Av.5 °F (36.9 °C), Min:98.5 °F (36.9 °C), Max:98.5 °F (36.9 °C)    Wt Readings from Last 3 Encounters:  18 : 162 lb 5 oz (73.6 kg)  18 : 170 lb

## 2018-04-10 NOTE — PROCEDURES
OPERATION(S) PERFORMED:   1. Dual-chamber pacemaker implant to preserve AV synchrony and prevent pacemaker syndrome. 2. Chest fluoroscopy. 3. Left upper extremity venography.      : Cheri Lares MD    INDICATION: high grade AV block on LINQ    C the pocket along with the pulse generator. The incision was closed in 3 layers using 2-0, 3-0, and 4-0 Vicryl. Steri-Strips were applied followed by a sterile dressing.  The left arm was placed in a sling and the patient was transported to telemetry in [facility]

## 2018-04-11 ENCOUNTER — APPOINTMENT (OUTPATIENT)
Dept: GENERAL RADIOLOGY | Facility: HOSPITAL | Age: 73
End: 2018-04-11
Attending: INTERNAL MEDICINE
Payer: MEDICARE

## 2018-04-11 VITALS
BODY MASS INDEX: 27.71 KG/M2 | TEMPERATURE: 98 F | WEIGHT: 162.31 LBS | HEIGHT: 64 IN | SYSTOLIC BLOOD PRESSURE: 150 MMHG | DIASTOLIC BLOOD PRESSURE: 66 MMHG | RESPIRATION RATE: 18 BRPM | HEART RATE: 84 BPM | OXYGEN SATURATION: 99 %

## 2018-04-11 PROCEDURE — 82962 GLUCOSE BLOOD TEST: CPT

## 2018-04-11 PROCEDURE — 83735 ASSAY OF MAGNESIUM: CPT | Performed by: INTERNAL MEDICINE

## 2018-04-11 PROCEDURE — 71046 X-RAY EXAM CHEST 2 VIEWS: CPT | Performed by: INTERNAL MEDICINE

## 2018-04-11 RX ADMIN — LABETALOL 100 MG: 100 TABLET, FILM COATED ORAL at 14:00:00

## 2018-04-11 RX ADMIN — ASPIRIN 81 MG: 81 TABLET, CHEWABLE ORAL at 09:48:00

## 2018-04-11 RX ADMIN — LABETALOL 100 MG: 100 TABLET, FILM COATED ORAL at 05:38:00

## 2018-04-11 RX ADMIN — DOCUSATE SODIUM 100 MG: 100 CAPSULE, LIQUID FILLED ORAL at 09:48:00

## 2018-04-11 RX ADMIN — AMLODIPINE BESYLATE 5 MG: 5 TABLET ORAL at 09:48:00

## 2018-04-11 RX ADMIN — LOSARTAN POTASSIUM 50 MG: 50 TABLET ORAL at 09:48:00

## 2018-04-11 NOTE — PROGRESS NOTES
Discharge instructions given w/ printed avs -verbalized understanding  Discharge per medicar  w/ belongings to [facility] [place]

## 2018-04-11 NOTE — PROGRESS NOTES
[facility]  Progress Note    Juan F Sutton Patient Status:  Outpatient in a Bed    1945 MRN [id number]   [facility] 2NE-A Attending Jean Polanco MD   Hosp Day # 0 PCP No primary care provider on file. Assessment:  1.   High Besylate  5 mg Oral Daily   • aspirin  81 mg Oral Daily   • atorvastatin  80 mg Oral Nightly   • docusate sodium  100 mg Oral BID   • DULoxetine HCl  30 mg Oral QPM   • Labetalol HCl  100 mg Oral Q8H [facility]   • Losartan Potassium  50 mg Oral BID   • hydrALAzi

## 2018-04-11 NOTE — PLAN OF CARE
CARDIOVASCULAR - ADULT    • Absence of cardiac arrhythmias or at baseline Progressing        Cardiac monitor shows. ...SR , Apaced at times. S/P ppm placement and LINQ removal.  Post procedural instructions provided.   POC instructed to pt. verbalized under

## 2018-04-11 NOTE — CM/SW NOTE
04/11/18 1000   CM/SW Referral Data   Referral Source Physician;Social Work (self-referral)   Reason for Referral Discharge planning   Informant Patient   Choice of HHC/SNF/HOSPICE   Informed of right to choose provider Yes   Patient/family choice Jorge

## 2018-04-11 NOTE — CM/SW NOTE
04/11/18 1000   Discharge disposition   Expected discharge disposition Skilled Nurs   Name of [address] [facility]   Discharge transportation Adelina

## 2018-04-12 ENCOUNTER — PRIOR ORIGINAL RECORDS (OUTPATIENT)
Dept: OTHER | Age: 73
End: 2018-04-12

## 2018-04-12 ENCOUNTER — SNF VISIT (OUTPATIENT)
Dept: INTERNAL MEDICINE CLINIC | Age: 73
End: 2018-04-12

## 2018-04-12 ENCOUNTER — NURSE ONLY (OUTPATIENT)
Dept: LAB | Age: 73
End: 2018-04-12
Attending: NURSE PRACTITIONER
Payer: MEDICARE

## 2018-04-12 DIAGNOSIS — I63.9 ACUTE CVA (CEREBROVASCULAR ACCIDENT) (HCC): ICD-10-CM

## 2018-04-12 DIAGNOSIS — E11.8 TYPE 2 DIABETES MELLITUS WITH COMPLICATION, WITHOUT LONG-TERM CURRENT USE OF INSULIN (HCC): ICD-10-CM

## 2018-04-12 DIAGNOSIS — I10 ESSENTIAL HYPERTENSION: ICD-10-CM

## 2018-04-12 DIAGNOSIS — F43.23 ADJUSTMENT DISORDER WITH MIXED ANXIETY AND DEPRESSED MOOD: ICD-10-CM

## 2018-04-12 DIAGNOSIS — F43.21 ADJUSTMENT DISORDER WITH DEPRESSED MOOD: ICD-10-CM

## 2018-04-12 DIAGNOSIS — I10 ESSENTIAL HYPERTENSION, MALIGNANT: Primary | ICD-10-CM

## 2018-04-12 DIAGNOSIS — I63.9 IMPENDING CEREBROVASCULAR ACCIDENT (HCC): ICD-10-CM

## 2018-04-12 PROCEDURE — 83735 ASSAY OF MAGNESIUM: CPT

## 2018-04-12 PROCEDURE — 80053 COMPREHEN METABOLIC PANEL: CPT

## 2018-04-12 PROCEDURE — 85027 COMPLETE CBC AUTOMATED: CPT

## 2018-04-12 PROCEDURE — 82306 VITAMIN D 25 HYDROXY: CPT

## 2018-04-12 PROCEDURE — 99310 SBSQ NF CARE HIGH MDM 45: CPT | Performed by: NURSE PRACTITIONER

## 2018-04-12 NOTE — PROGRESS NOTES
Andrew Stout  : 1945  Age 81year old  female patient is admitted to [facility] for rehabilitation due to s/p pacemaker placement.      [address] [place] [address] Drive date: 4/10/2018   Discharge date to [facility]:  2018  ELOS:  14 days [facility] for acute rehab is been stressed the need for her to participate. Patient has poor realization of her severe limitations at this point. Daughter is in process of obtaining PCP for her mother.   1 daughters from out of town the other daughter i Diclofenac Sodium 1 % Transdermal Gel Apply 2 g topically 2 (two) times daily. Left scapula Disp:  Rfl:    magnesium oxide 400 MG Oral Tab Take 400 mg by mouth daily. Disp:  Rfl:    bisacodyl 10 MG Rectal Suppos Place 10 mg rectally every other day.  Disp no apparent distress  LINES, TUBES, DRAINS:  none  SKIN: no rashes, no suspicious lesions, pale, warm, dry  WOUND: incisions x 2 to left upper chest with steri strips in place c/d/I no s/s of infection   EYES: PERRLA, EOMI, sclera anicteric, conjunctiva no 04/12/2018       Lab Results  Component Value Date   VITD 17.3 (L) 04/12/2018       Lab Results  Component Value Date   MG 2.0 04/12/2018     · Implantation of Linq device  · CTA of the brain neck show no evidence of aneurysm or dissection less than 50% bi

## 2018-04-17 ENCOUNTER — SNF ADMIT/H&P (OUTPATIENT)
Dept: FAMILY MEDICINE CLINIC | Facility: CLINIC | Age: 73
End: 2018-04-17

## 2018-04-17 DIAGNOSIS — G81.94 LEFT HEMIPARESIS (HCC): ICD-10-CM

## 2018-04-17 DIAGNOSIS — E78.5 HYPERLIPIDEMIA, UNSPECIFIED HYPERLIPIDEMIA TYPE: ICD-10-CM

## 2018-04-17 DIAGNOSIS — Z91.14 NON COMPLIANCE W MEDICATION REGIMEN: ICD-10-CM

## 2018-04-17 DIAGNOSIS — F41.9 ANXIETY DISORDER, UNSPECIFIED TYPE: ICD-10-CM

## 2018-04-17 DIAGNOSIS — I16.1 HYPERTENSIVE EMERGENCY: ICD-10-CM

## 2018-04-17 DIAGNOSIS — R73.9 HYPERGLYCEMIA: ICD-10-CM

## 2018-04-17 DIAGNOSIS — F43.23 ADJUSTMENT DISORDER WITH MIXED ANXIETY AND DEPRESSED MOOD: ICD-10-CM

## 2018-04-17 DIAGNOSIS — E11.8 TYPE 2 DIABETES MELLITUS WITH COMPLICATION, WITHOUT LONG-TERM CURRENT USE OF INSULIN (HCC): ICD-10-CM

## 2018-04-17 DIAGNOSIS — R77.8 TROPONIN LEVEL ELEVATED: ICD-10-CM

## 2018-04-17 DIAGNOSIS — I63.9 SMALL VESSEL STROKE (HCC): ICD-10-CM

## 2018-04-17 DIAGNOSIS — I63.9 ACUTE CVA (CEREBROVASCULAR ACCIDENT) (HCC): Primary | ICD-10-CM

## 2018-04-17 DIAGNOSIS — F60.6 AVOIDANT PERSONALITY DISORDER (HCC): ICD-10-CM

## 2018-04-17 DIAGNOSIS — R53.1 GENERALIZED WEAKNESS: ICD-10-CM

## 2018-04-17 DIAGNOSIS — R53.81 PHYSICAL DECONDITIONING: ICD-10-CM

## 2018-04-17 DIAGNOSIS — F43.21 ADJUSTMENT DISORDER WITH DEPRESSED MOOD: ICD-10-CM

## 2018-04-17 PROCEDURE — 99306 1ST NF CARE HIGH MDM 50: CPT | Performed by: FAMILY MEDICINE

## 2018-04-19 ENCOUNTER — NURSE ONLY (OUTPATIENT)
Dept: LAB | Age: 73
End: 2018-04-19
Attending: NURSE PRACTITIONER
Payer: MEDICARE

## 2018-04-19 DIAGNOSIS — I10 HTN (HYPERTENSION): Primary | ICD-10-CM

## 2018-04-19 PROBLEM — Z91.148 NON COMPLIANCE W MEDICATION REGIMEN: Status: ACTIVE | Noted: 2018-04-19

## 2018-04-19 PROBLEM — R53.81 PHYSICAL DECONDITIONING: Status: ACTIVE | Noted: 2018-04-19

## 2018-04-19 PROBLEM — F60.6 AVOIDANT PERSONALITY DISORDER (HCC): Status: ACTIVE | Noted: 2018-04-19

## 2018-04-19 PROBLEM — R53.1 GENERALIZED WEAKNESS: Status: ACTIVE | Noted: 2018-04-19

## 2018-04-19 PROBLEM — G81.94 LEFT HEMIPARESIS (HCC): Status: ACTIVE | Noted: 2018-04-19

## 2018-04-19 PROBLEM — E78.5 HYPERLIPIDEMIA: Status: ACTIVE | Noted: 2018-04-19

## 2018-04-19 PROBLEM — Z91.14 NON COMPLIANCE W MEDICATION REGIMEN: Status: ACTIVE | Noted: 2018-04-19

## 2018-04-19 PROCEDURE — 80053 COMPREHEN METABOLIC PANEL: CPT

## 2018-04-19 PROCEDURE — 83735 ASSAY OF MAGNESIUM: CPT

## 2018-04-19 PROCEDURE — 85027 COMPLETE CBC AUTOMATED: CPT

## 2018-04-20 NOTE — PROGRESS NOTES
[facility] Author: Sara Macias MD     1945 MRN [id number]   [facility]  Admission 4/10/18      Last Hospital Discharge 18 PCP No primary care provider on file.    Hospital of Discharge 18       Date o [facility] rehab with improved left hemiplegia. Patient remains non compliant with medication for both type II diabetes and uncontrolled blood pressure and has been refusing prn hydralazine. Patient has refused medication for mood and HL.    Patient d Inject 1-10 Units into the skin 4 (four) times daily before meals and nightly. docusate sodium 100 MG Oral Cap Take 100 mg by mouth 2 (two) times daily. PEG 3350 Oral Powd Pack Take 17 g by mouth daily as needed.    Clopidogrel Bisulfate 75 MG Oral Tab exhibits no distension and no mass. There is no tenderness. There is no rebound and no guarding. No hernia. Musculoskeletal: Normal range of motion. She exhibits no edema or tenderness. Lymphadenopathy:     She has no cervical adenopathy.    Neurologica which includes the Dose Index Registry. PATIENT STATED HISTORY: (As transcribed by Technologist)  Patient complains of slurred speech. FINDINGS:  VENTRICLES/SULCI:  Ventricles and sulci are mildly prominent.  INTRACRANIAL:  No acute intracranial hemorrh system. No pneumothorax.      Dictated by: Harvinder Garcia MD on 4/10/2018 at 19:43     Approved by: Harvinder Garcia MD                Recent Results (from the past 67 hour(s))  -COMP METABOLIC PANEL (14)   Collection Time: 04/19/18  7:55 AM   Result Value Ref Ran insulin (HCC)  -stable, continue with iss, levemir to continue    7. Adjustment disorder with mixed anxiety and depressed mood  -refused cymbalta and zoloft    8. Anxiety disorder, unspecified type  -not under control, CPM    9.  Adjustment disorder with de

## 2018-04-23 ENCOUNTER — PRIOR ORIGINAL RECORDS (OUTPATIENT)
Dept: OTHER | Age: 73
End: 2018-04-23

## 2018-04-23 ENCOUNTER — NURSE ONLY (OUTPATIENT)
Dept: LAB | Age: 73
End: 2018-04-23
Attending: FAMILY MEDICINE
Payer: MEDICARE

## 2018-04-23 DIAGNOSIS — Z45.018 FITTING AND ADJUSTMENT OF CARDIAC PACEMAKER: Primary | ICD-10-CM

## 2018-04-23 PROCEDURE — 83735 ASSAY OF MAGNESIUM: CPT

## 2018-04-23 PROCEDURE — 80053 COMPREHEN METABOLIC PANEL: CPT

## 2018-04-23 PROCEDURE — 85027 COMPLETE CBC AUTOMATED: CPT

## 2018-05-03 ENCOUNTER — PRIOR ORIGINAL RECORDS (OUTPATIENT)
Dept: OTHER | Age: 73
End: 2018-05-03

## 2018-05-14 ENCOUNTER — MED REC SCAN ONLY (OUTPATIENT)
Dept: FAMILY MEDICINE CLINIC | Facility: CLINIC | Age: 73
End: 2018-05-14

## 2018-05-16 LAB
ALBUMIN: 2.7 G/DL
ALKALINE PHOSPHATATE(ALK PHOS): 349 IU/L
BILIRUBIN TOTAL: 0.4 MG/DL
BUN: 18 MG/DL
CALCIUM: 8.7 MG/DL
CHLORIDE: 110 MEQ/L
CREATININE, SERUM: 0.9 MG/DL
GLUCOSE: 93 MG/DL
HEMATOCRIT: 30.3 %
HEMOGLOBIN: 10.3 G/DL
MAGNESIUM: 2 MG/DL
PLATELETS: 268 K/UL
POTASSIUM, SERUM: 3.7 MEQ/L
PROTEIN, TOTAL: 6.1 G/DL
RED BLOOD COUNT: 3.49 X 10-6/U
SGOT (AST): 54 IU/L
SGPT (ALT): 71 IU/L
SODIUM: 141 MEQ/L
VITAMIN D 25-OH: 17.3 NG/ML
WHITE BLOOD COUNT: 7.5 X 10-3/U

## 2018-05-31 ENCOUNTER — PRIOR ORIGINAL RECORDS (OUTPATIENT)
Dept: OTHER | Age: 73
End: 2018-05-31

## 2018-05-31 ENCOUNTER — MYAURORA ACCOUNT LINK (OUTPATIENT)
Dept: OTHER | Age: 73
End: 2018-05-31

## 2018-06-15 ENCOUNTER — MED REC SCAN ONLY (OUTPATIENT)
Dept: FAMILY MEDICINE CLINIC | Facility: CLINIC | Age: 73
End: 2018-06-15

## 2018-07-05 ENCOUNTER — MED REC SCAN ONLY (OUTPATIENT)
Dept: FAMILY MEDICINE CLINIC | Facility: CLINIC | Age: 73
End: 2018-07-05

## 2018-08-13 ENCOUNTER — MED REC SCAN ONLY (OUTPATIENT)
Dept: FAMILY MEDICINE CLINIC | Facility: CLINIC | Age: 73
End: 2018-08-13

## 2018-08-16 ENCOUNTER — MYAURORA ACCOUNT LINK (OUTPATIENT)
Dept: OTHER | Age: 73
End: 2018-08-16

## 2018-08-16 ENCOUNTER — PRIOR ORIGINAL RECORDS (OUTPATIENT)
Dept: OTHER | Age: 73
End: 2018-08-16

## 2018-09-14 ENCOUNTER — MED REC SCAN ONLY (OUTPATIENT)
Dept: FAMILY MEDICINE CLINIC | Facility: CLINIC | Age: 73
End: 2018-09-14

## 2018-10-01 ENCOUNTER — PRIOR ORIGINAL RECORDS (OUTPATIENT)
Dept: OTHER | Age: 73
End: 2018-10-01

## 2018-10-23 ENCOUNTER — CHARTING TRANS (OUTPATIENT)
Dept: OTHER | Age: 73
End: 2018-10-23

## 2018-10-25 ENCOUNTER — MED REC SCAN ONLY (OUTPATIENT)
Dept: FAMILY MEDICINE CLINIC | Facility: CLINIC | Age: 73
End: 2018-10-25

## 2018-10-26 ENCOUNTER — PRIOR ORIGINAL RECORDS (OUTPATIENT)
Dept: OTHER | Age: 73
End: 2018-10-26

## 2018-11-02 ENCOUNTER — CHARTING TRANS (OUTPATIENT)
Dept: OTHER | Age: 73
End: 2018-11-02

## 2018-11-02 ENCOUNTER — PRIOR ORIGINAL RECORDS (OUTPATIENT)
Dept: OTHER | Age: 73
End: 2018-11-02

## 2018-11-02 ENCOUNTER — MED REC SCAN ONLY (OUTPATIENT)
Dept: FAMILY MEDICINE CLINIC | Facility: CLINIC | Age: 73
End: 2018-11-02

## 2018-11-12 ENCOUNTER — MYAURORA ACCOUNT LINK (OUTPATIENT)
Dept: OTHER | Age: 73
End: 2018-11-12

## 2018-11-14 ENCOUNTER — CHARTING TRANS (OUTPATIENT)
Dept: OTHER | Age: 73
End: 2018-11-14

## 2018-11-15 ENCOUNTER — CHARTING TRANS (OUTPATIENT)
Dept: OTHER | Age: 73
End: 2018-11-15

## 2018-11-15 PROBLEM — I63.00 CEREBROVASCULAR ACCIDENT (CVA) DUE TO THROMBOSIS OF PRECEREBRAL ARTERY (HCC): Status: ACTIVE | Noted: 2018-03-11

## 2018-11-15 PROBLEM — L60.0 ONYCHOCRYPTOSIS: Status: ACTIVE | Noted: 2018-11-15

## 2018-11-19 ENCOUNTER — CHARTING TRANS (OUTPATIENT)
Dept: OTHER | Age: 73
End: 2018-11-19

## 2018-11-27 ENCOUNTER — CHARTING TRANS (OUTPATIENT)
Dept: OTHER | Age: 73
End: 2018-11-27

## 2018-11-28 ENCOUNTER — LAB SERVICES (OUTPATIENT)
Dept: OTHER | Age: 73
End: 2018-11-28

## 2018-11-28 ENCOUNTER — CHARTING TRANS (OUTPATIENT)
Dept: OTHER | Age: 73
End: 2018-11-28

## 2018-11-28 LAB
25(OH)D3 SERPL-MCNC: 34 NG/ML (ref 30–100)
DIFFERENTIAL TYPE: NORMAL
HEMATOCRIT: 34.9 % (ref 34–45)
HEMOGLOBIN: 12.4 G/DL (ref 11.2–15.7)
LYMPH PERCENT: 22.5 % (ref 20.5–51.1)
LYMPHOCYTE ABSOLUTE #: 1.7 10*3/UL (ref 1.2–3.4)
MEAN CORPUSCULAR HGB CONCENTRATION: 35.5 % (ref 32–36)
MEAN CORPUSCULAR HGB: 31 PG (ref 27–34)
MEAN CORPUSCULAR VOLUME: 87.3 FL (ref 79–95)
MEAN PLATELET VOLUME: 9.4 FL (ref 8.6–12.4)
MIXED %: 10.6 % (ref 4.3–12.9)
MIXED ABSOLUTE #: 0.8 10*3/UL (ref 0.2–0.9)
NEUTROPHIL ABSOLUTE #: 5.1 10*3/UL (ref 1.4–6.5)
NEUTROPHIL PERCENT: 66.9 % (ref 34–73.5)
PLATELET COUNT: 271 10*3/UL (ref 150–400)
RED BLOOD CELL COUNT: 4 10*6/UL (ref 3.7–5.2)
RED CELL DISTRIBUTION WIDTH: 12 % (ref 11.3–14.8)
WHITE BLOOD CELL COUNT: 7.6 10*3/UL (ref 4–10)

## 2018-11-29 ENCOUNTER — CHARTING TRANS (OUTPATIENT)
Dept: OTHER | Age: 73
End: 2018-11-29

## 2018-11-30 ENCOUNTER — PRIOR ORIGINAL RECORDS (OUTPATIENT)
Dept: OTHER | Age: 73
End: 2018-11-30

## 2018-12-01 DIAGNOSIS — I87.2 VENOUS INSUFFICIENCY: Primary | ICD-10-CM

## 2018-12-03 ENCOUNTER — PRIOR ORIGINAL RECORDS (OUTPATIENT)
Dept: OTHER | Age: 73
End: 2018-12-03

## 2018-12-04 ENCOUNTER — TELEPHONE (OUTPATIENT)
Dept: INTERNAL MEDICINE | Age: 73
End: 2018-12-04

## 2018-12-04 ENCOUNTER — PRIOR ORIGINAL RECORDS (OUTPATIENT)
Dept: OTHER | Age: 73
End: 2018-12-04

## 2018-12-07 ENCOUNTER — PRIOR ORIGINAL RECORDS (OUTPATIENT)
Dept: OTHER | Age: 73
End: 2018-12-07

## 2018-12-07 ENCOUNTER — MYAURORA ACCOUNT LINK (OUTPATIENT)
Dept: OTHER | Age: 73
End: 2018-12-07

## 2018-12-11 ENCOUNTER — TELEPHONE (OUTPATIENT)
Dept: INTERNAL MEDICINE | Age: 73
End: 2018-12-11

## 2018-12-12 PROBLEM — R79.89 TROPONIN LEVEL ELEVATED: Status: RESOLVED | Noted: 2018-03-11 | Resolved: 2018-12-12

## 2018-12-12 PROBLEM — L60.0 ONYCHOCRYPTOSIS: Status: RESOLVED | Noted: 2018-11-15 | Resolved: 2018-12-12

## 2018-12-12 PROBLEM — R73.9 HYPERGLYCEMIA: Status: RESOLVED | Noted: 2018-03-11 | Resolved: 2018-12-12

## 2018-12-12 PROBLEM — R77.8 TROPONIN LEVEL ELEVATED: Status: RESOLVED | Noted: 2018-03-11 | Resolved: 2018-12-12

## 2018-12-12 PROBLEM — R53.1 GENERALIZED WEAKNESS: Status: RESOLVED | Noted: 2018-04-19 | Resolved: 2018-12-12

## 2018-12-12 PROBLEM — R53.81 PHYSICAL DECONDITIONING: Status: RESOLVED | Noted: 2018-04-19 | Resolved: 2018-12-12

## 2018-12-12 PROBLEM — I16.1 HYPERTENSIVE EMERGENCY: Status: RESOLVED | Noted: 2018-03-11 | Resolved: 2018-12-12

## 2018-12-17 ENCOUNTER — TELEPHONE (OUTPATIENT)
Dept: INTERNAL MEDICINE | Age: 73
End: 2018-12-17

## 2018-12-17 DIAGNOSIS — S01.319A TORN EAR LOBE, UNSPECIFIED LATERALITY, INITIAL ENCOUNTER: Primary | ICD-10-CM

## 2018-12-18 RX ORDER — LOSARTAN POTASSIUM 50 MG/1
100 TABLET ORAL DAILY
COMMUNITY
Start: 2018-11-14 | End: 2019-01-01 | Stop reason: SDUPTHER

## 2018-12-18 RX ORDER — POLYETHYLENE GLYCOL 3350 17 G/17G
17 POWDER, FOR SOLUTION ORAL
COMMUNITY
Start: 2018-03-19 | End: 2019-01-01 | Stop reason: SDUPTHER

## 2018-12-18 RX ORDER — CLOPIDOGREL BISULFATE 75 MG/1
75 TABLET ORAL DAILY
COMMUNITY
Start: 2018-11-14 | End: 2019-01-01 | Stop reason: SDUPTHER

## 2018-12-18 RX ORDER — PANTOPRAZOLE SODIUM 40 MG/1
40 TABLET, DELAYED RELEASE ORAL DAILY
COMMUNITY
Start: 2018-11-14 | End: 2019-01-01 | Stop reason: SDUPTHER

## 2018-12-18 RX ORDER — GLIPIZIDE 5 MG/1
5 TABLET, FILM COATED, EXTENDED RELEASE ORAL DAILY
COMMUNITY
Start: 2018-11-14 | End: 2019-01-01 | Stop reason: SDUPTHER

## 2018-12-18 RX ORDER — ERGOCALCIFEROL 1.25 MG/1
50000 CAPSULE ORAL
COMMUNITY
End: 2019-01-01 | Stop reason: ALTCHOICE

## 2018-12-18 RX ORDER — HYDROCHLOROTHIAZIDE 25 MG/1
25 TABLET ORAL DAILY
COMMUNITY
Start: 2018-11-14 | End: 2019-01-01 | Stop reason: SDUPTHER

## 2018-12-18 RX ORDER — PSEUDOEPHEDRINE HCL 30 MG
100 TABLET ORAL
COMMUNITY
Start: 2018-03-19 | End: 2019-01-01 | Stop reason: SDUPTHER

## 2018-12-18 RX ORDER — ASPIRIN 81 MG/1
81 TABLET, CHEWABLE ORAL DAILY
COMMUNITY
Start: 2018-11-14 | End: 2019-01-01 | Stop reason: SDUPTHER

## 2018-12-18 RX ORDER — GABAPENTIN 100 MG/1
CAPSULE ORAL
COMMUNITY
Start: 2018-11-14 | End: 2019-01-01 | Stop reason: SDUPTHER

## 2018-12-18 RX ORDER — AMLODIPINE BESYLATE 10 MG/1
10 TABLET ORAL DAILY
COMMUNITY
Start: 2018-11-30 | End: 2019-01-01 | Stop reason: SDUPTHER

## 2018-12-18 RX ORDER — LABETALOL 200 MG/1
200 TABLET, FILM COATED ORAL 2 TIMES DAILY
COMMUNITY
Start: 2018-11-14 | End: 2019-01-01 | Stop reason: SDUPTHER

## 2018-12-18 RX ORDER — MAGNESIUM OXIDE 400 MG/1
400 TABLET ORAL 3 TIMES DAILY
COMMUNITY
Start: 2018-11-14 | End: 2019-01-01 | Stop reason: SDUPTHER

## 2018-12-19 ENCOUNTER — TELEPHONE (OUTPATIENT)
Dept: SCHEDULING | Age: 73
End: 2018-12-19

## 2018-12-25 DIAGNOSIS — I48.91 ATRIAL FIBRILLATION, UNSPECIFIED TYPE (CMD): ICD-10-CM

## 2018-12-25 DIAGNOSIS — E11.59 CONTROLLED TYPE 2 DIABETES MELLITUS WITH OTHER CIRCULATORY COMPLICATION, UNSPECIFIED WHETHER LONG TERM INSULIN USE (CMD): ICD-10-CM

## 2018-12-25 DIAGNOSIS — I10 ESSENTIAL HYPERTENSION, BENIGN: Primary | ICD-10-CM

## 2018-12-25 DIAGNOSIS — I63.10 CEREBROVASCULAR ACCIDENT (CVA) DUE TO EMBOLISM OF PRECEREBRAL ARTERY (CMD): ICD-10-CM

## 2018-12-25 DIAGNOSIS — E78.2 MIXED HYPERLIPIDEMIA: ICD-10-CM

## 2018-12-25 DIAGNOSIS — H26.9 CATARACT, UNSPECIFIED CATARACT TYPE, UNSPECIFIED LATERALITY: ICD-10-CM

## 2018-12-25 DIAGNOSIS — E55.9 VITAMIN D DEFICIENCY: ICD-10-CM

## 2018-12-25 DIAGNOSIS — M15.9 OSTEOARTHRITIS OF MULTIPLE JOINTS, UNSPECIFIED OSTEOARTHRITIS TYPE: ICD-10-CM

## 2019-01-01 ENCOUNTER — TELEPHONE (OUTPATIENT)
Dept: NEUROLOGY | Facility: CLINIC | Age: 74
End: 2019-01-01

## 2019-01-01 ENCOUNTER — PATIENT MESSAGE (OUTPATIENT)
Dept: NEUROLOGY | Facility: CLINIC | Age: 74
End: 2019-01-01

## 2019-01-01 ENCOUNTER — TELEPHONE (OUTPATIENT)
Dept: CARDIOLOGY | Age: 74
End: 2019-01-01

## 2019-01-01 ENCOUNTER — TELEPHONE (OUTPATIENT)
Dept: INTERNAL MEDICINE | Age: 74
End: 2019-01-01

## 2019-01-01 ENCOUNTER — MOBILE ENCOUNTER (OUTPATIENT)
Dept: NEUROLOGY | Facility: CLINIC | Age: 74
End: 2019-01-01

## 2019-01-01 ENCOUNTER — OFFICE VISIT (OUTPATIENT)
Dept: NEUROLOGY | Facility: CLINIC | Age: 74
End: 2019-01-01
Payer: MEDICARE

## 2019-01-01 ENCOUNTER — ANCILLARY PROCEDURE (OUTPATIENT)
Dept: CARDIOLOGY | Age: 74
End: 2019-01-01
Attending: INTERNAL MEDICINE

## 2019-01-01 ENCOUNTER — HOSPITAL ENCOUNTER (EMERGENCY)
Facility: HOSPITAL | Age: 74
Discharge: LEFT BEFORE DISCHARGE | End: 2019-01-01
Attending: EMERGENCY MEDICINE
Payer: MEDICARE

## 2019-01-01 ENCOUNTER — LAB SERVICES (OUTPATIENT)
Dept: LAB | Age: 74
End: 2019-01-01

## 2019-01-01 ENCOUNTER — EXTERNAL RECORD (OUTPATIENT)
Dept: HEALTH INFORMATION MANAGEMENT | Facility: OTHER | Age: 74
End: 2019-01-01

## 2019-01-01 ENCOUNTER — OFF PREMISE (OUTPATIENT)
Dept: INTERNAL MEDICINE | Age: 74
End: 2019-01-01

## 2019-01-01 ENCOUNTER — APPOINTMENT (OUTPATIENT)
Dept: CARDIOLOGY | Age: 74
End: 2019-01-01
Attending: INTERNAL MEDICINE

## 2019-01-01 ENCOUNTER — ANCILLARY ORDERS (OUTPATIENT)
Dept: CARDIOLOGY | Age: 74
End: 2019-01-01

## 2019-01-01 ENCOUNTER — HOSPITAL ENCOUNTER (OUTPATIENT)
Dept: MRI IMAGING | Facility: HOSPITAL | Age: 74
Discharge: HOME | End: 2019-01-01
Attending: PHYSICAL MEDICINE & REHABILITATION
Payer: MEDICARE

## 2019-01-01 ENCOUNTER — APPOINTMENT (OUTPATIENT)
Dept: CT IMAGING | Facility: HOSPITAL | Age: 74
End: 2019-01-01
Attending: EMERGENCY MEDICINE
Payer: MEDICARE

## 2019-01-01 ENCOUNTER — OFFICE VISIT (OUTPATIENT)
Dept: CARDIOLOGY | Age: 74
End: 2019-01-01

## 2019-01-01 ENCOUNTER — DOCUMENTATION (OUTPATIENT)
Dept: CARDIOLOGY | Age: 74
End: 2019-01-01

## 2019-01-01 ENCOUNTER — MED REC SCAN ONLY (OUTPATIENT)
Dept: NEUROLOGY | Facility: CLINIC | Age: 74
End: 2019-01-01

## 2019-01-01 VITALS
HEART RATE: 73 BPM | OXYGEN SATURATION: 98 % | SYSTOLIC BLOOD PRESSURE: 194 MMHG | RESPIRATION RATE: 15 BRPM | DIASTOLIC BLOOD PRESSURE: 73 MMHG | TEMPERATURE: 97 F | BODY MASS INDEX: 27 KG/M2 | WEIGHT: 160 LBS

## 2019-01-01 VITALS
DIASTOLIC BLOOD PRESSURE: 64 MMHG | BODY MASS INDEX: 28.68 KG/M2 | HEART RATE: 64 BPM | WEIGHT: 168 LBS | SYSTOLIC BLOOD PRESSURE: 134 MMHG | HEIGHT: 64 IN

## 2019-01-01 VITALS
OXYGEN SATURATION: 97 % | RESPIRATION RATE: 18 BRPM | HEART RATE: 70 BPM | SYSTOLIC BLOOD PRESSURE: 156 MMHG | DIASTOLIC BLOOD PRESSURE: 82 MMHG

## 2019-01-01 VITALS
WEIGHT: 165 LBS | HEART RATE: 62 BPM | BODY MASS INDEX: 28.17 KG/M2 | HEIGHT: 64 IN | RESPIRATION RATE: 16 BRPM | DIASTOLIC BLOOD PRESSURE: 70 MMHG | SYSTOLIC BLOOD PRESSURE: 118 MMHG

## 2019-01-01 VITALS — DIASTOLIC BLOOD PRESSURE: 78 MMHG | RESPIRATION RATE: 15 BRPM | SYSTOLIC BLOOD PRESSURE: 154 MMHG | HEART RATE: 62 BPM

## 2019-01-01 VITALS
DIASTOLIC BLOOD PRESSURE: 80 MMHG | RESPIRATION RATE: 14 BRPM | HEART RATE: 64 BPM | SYSTOLIC BLOOD PRESSURE: 138 MMHG | BODY MASS INDEX: 27 KG/M2 | WEIGHT: 160 LBS

## 2019-01-01 DIAGNOSIS — I10 ESSENTIAL HYPERTENSION, BENIGN: ICD-10-CM

## 2019-01-01 DIAGNOSIS — I10 UNCONTROLLED HYPERTENSION: ICD-10-CM

## 2019-01-01 DIAGNOSIS — M19.91 PRIMARY OSTEOARTHRITIS, UNSPECIFIED SITE: ICD-10-CM

## 2019-01-01 DIAGNOSIS — G81.94 LEFT HEMIPARESIS (HCC): ICD-10-CM

## 2019-01-01 DIAGNOSIS — R25.2 SPASTICITY: ICD-10-CM

## 2019-01-01 DIAGNOSIS — I69.30 CHRONIC ARTERIAL ISCHEMIC STROKE: Primary | ICD-10-CM

## 2019-01-01 DIAGNOSIS — R29.898 LEG WEAKNESS, BILATERAL: Primary | ICD-10-CM

## 2019-01-01 DIAGNOSIS — Z95.0 PACEMAKER: ICD-10-CM

## 2019-01-01 DIAGNOSIS — M15.9 OSTEOARTHRITIS OF MULTIPLE JOINTS, UNSPECIFIED OSTEOARTHRITIS TYPE: ICD-10-CM

## 2019-01-01 DIAGNOSIS — Z91.81 PERSONAL HISTORY OF FALL: ICD-10-CM

## 2019-01-01 DIAGNOSIS — I69.354 HEMIPARESIS OF LEFT NONDOMINANT SIDE AS LATE EFFECT OF CEREBRAL INFARCTION (HCC): ICD-10-CM

## 2019-01-01 DIAGNOSIS — E11.59 CONTROLLED TYPE 2 DIABETES MELLITUS WITH OTHER CIRCULATORY COMPLICATION, UNSPECIFIED WHETHER LONG TERM INSULIN USE (CMD): ICD-10-CM

## 2019-01-01 DIAGNOSIS — M54.2 NECK PAIN: ICD-10-CM

## 2019-01-01 DIAGNOSIS — Z86.73 HISTORY OF STROKE: ICD-10-CM

## 2019-01-01 DIAGNOSIS — I10 ESSENTIAL HYPERTENSION: ICD-10-CM

## 2019-01-01 DIAGNOSIS — R29.898 BILATERAL LEG WEAKNESS: ICD-10-CM

## 2019-01-01 DIAGNOSIS — Z79.02 LONG TERM (CURRENT) USE OF ANTITHROMBOTICS/ANTIPLATELETS: ICD-10-CM

## 2019-01-01 DIAGNOSIS — E78.00 PURE HYPERCHOLESTEROLEMIA: ICD-10-CM

## 2019-01-01 DIAGNOSIS — H26.9 CATARACT, UNSPECIFIED CATARACT TYPE, UNSPECIFIED LATERALITY: ICD-10-CM

## 2019-01-01 DIAGNOSIS — I63.10 CEREBROVASCULAR ACCIDENT (CVA) DUE TO EMBOLISM OF PRECEREBRAL ARTERY (CMD): ICD-10-CM

## 2019-01-01 DIAGNOSIS — E55.9 VITAMIN D DEFICIENCY: ICD-10-CM

## 2019-01-01 DIAGNOSIS — F32.9 CURRENT EPISODE OF MAJOR DEPRESSIVE DISORDER WITHOUT PRIOR EPISODE, UNSPECIFIED DEPRESSION EPISODE SEVERITY: ICD-10-CM

## 2019-01-01 DIAGNOSIS — M48.062 SPINAL STENOSIS OF LUMBAR REGION WITH NEUROGENIC CLAUDICATION: ICD-10-CM

## 2019-01-01 DIAGNOSIS — Z86.79 HISTORY OF COMPLETE HEART BLOCK: ICD-10-CM

## 2019-01-01 DIAGNOSIS — G81.94 LEFT HEMIPARESIS (HCC): Primary | ICD-10-CM

## 2019-01-01 DIAGNOSIS — Z79.84 LONG TERM (CURRENT) USE OF ORAL HYPOGLYCEMIC DRUGS: ICD-10-CM

## 2019-01-01 DIAGNOSIS — M62.838 SPASM OF MUSCLE: ICD-10-CM

## 2019-01-01 DIAGNOSIS — R20.0 NUMBNESS AND TINGLING: ICD-10-CM

## 2019-01-01 DIAGNOSIS — Z86.73 HISTORY OF STROKE: Primary | ICD-10-CM

## 2019-01-01 DIAGNOSIS — R20.2 NUMBNESS AND TINGLING: ICD-10-CM

## 2019-01-01 DIAGNOSIS — I10 ESSENTIAL HYPERTENSION, BENIGN: Primary | ICD-10-CM

## 2019-01-01 DIAGNOSIS — F41.9 ANXIETY DISORDER, UNSPECIFIED TYPE: ICD-10-CM

## 2019-01-01 DIAGNOSIS — I63.9 CEREBROVASCULAR ACCIDENT (CVA), UNSPECIFIED MECHANISM (CMD): Primary | ICD-10-CM

## 2019-01-01 DIAGNOSIS — E78.2 MIXED HYPERLIPIDEMIA: ICD-10-CM

## 2019-01-01 DIAGNOSIS — I48.91 ATRIAL FIBRILLATION, UNSPECIFIED TYPE (CMD): ICD-10-CM

## 2019-01-01 DIAGNOSIS — Z99.3 DEPENDENT ON WHEELCHAIR: ICD-10-CM

## 2019-01-01 LAB
25(OH)D3 SERPL-MCNC: 26.2 NG/ML (ref 30–100)
ALBUMIN SERPL-MCNC: 3.9 G/DL (ref 3.4–5)
ALBUMIN/GLOB SERPL: 1.1 {RATIO} (ref 1–2)
ALP LIVER SERPL-CCNC: 128 U/L (ref 55–142)
ALT SERPL-CCNC: 19 U/L (ref 13–56)
ANION GAP SERPL CALC-SCNC: 3 MMOL/L (ref 0–18)
AST SERPL-CCNC: 16 U/L (ref 15–37)
BASOPHILS # BLD AUTO: 0.09 X10(3) UL (ref 0–0.2)
BASOPHILS NFR BLD AUTO: 0.7 %
BILIRUB SERPL-MCNC: 0.4 MG/DL (ref 0.1–2)
BUN BLD-MCNC: 23 MG/DL (ref 7–18)
BUN/CREAT SERPL: 21.7 (ref 10–20)
CALCIUM BLD-MCNC: 9.7 MG/DL (ref 8.5–10.1)
CHLORIDE SERPL-SCNC: 107 MMOL/L (ref 98–112)
CO2 SERPL-SCNC: 27 MMOL/L (ref 21–32)
CREAT BLD-MCNC: 1.06 MG/DL (ref 0.55–1.02)
DEPRECATED RDW RBC AUTO: 39.8 FL (ref 35.1–46.3)
EOSINOPHIL # BLD AUTO: 0.4 X10(3) UL (ref 0–0.7)
EOSINOPHIL NFR BLD AUTO: 3.3 %
ERYTHROCYTE [DISTWIDTH] IN BLOOD BY AUTOMATED COUNT: 12.3 % (ref 11–15)
EST. AVERAGE GLUCOSE BLD GHB EST-MCNC: 124 MG/DL (ref 0–154)
GLOBULIN PLAS-MCNC: 3.7 G/DL (ref 2.8–4.4)
GLUCOSE BLD-MCNC: 108 MG/DL (ref 70–99)
HBA1C BLD-MCNC: 6 % (ref 4.2–6)
HCT VFR BLD AUTO: 38.1 % (ref 35–48)
HGB BLD-MCNC: 13.1 G/DL (ref 12–16)
IMM GRANULOCYTES # BLD AUTO: 0.04 X10(3) UL (ref 0–1)
IMM GRANULOCYTES NFR BLD: 0.3 %
LYMPHOCYTES # BLD AUTO: 2.09 X10(3) UL (ref 1–4)
LYMPHOCYTES NFR BLD AUTO: 17.1 %
M PROTEIN MFR SERPL ELPH: 7.6 G/DL (ref 6.4–8.2)
MCH RBC QN AUTO: 30.6 PG (ref 26–34)
MCHC RBC AUTO-ENTMCNC: 34.4 G/DL (ref 31–37)
MCV RBC AUTO: 89 FL (ref 80–100)
MONOCYTES # BLD AUTO: 0.87 X10(3) UL (ref 0.1–1)
MONOCYTES NFR BLD AUTO: 7.1 %
NEUTROPHILS # BLD AUTO: 8.7 X10 (3) UL (ref 1.5–7.7)
NEUTROPHILS # BLD AUTO: 8.7 X10(3) UL (ref 1.5–7.7)
NEUTROPHILS NFR BLD AUTO: 71.5 %
OSMOLALITY SERPL CALC.SUM OF ELEC: 288 MOSM/KG (ref 275–295)
PLATELET # BLD AUTO: 289 10(3)UL (ref 150–450)
POTASSIUM SERPL-SCNC: 4.1 MMOL/L (ref 3.5–5.1)
RBC # BLD AUTO: 4.28 X10(6)UL (ref 3.8–5.3)
SODIUM SERPL-SCNC: 137 MMOL/L (ref 136–145)
TSH SERPL DL<=0.05 MIU/L-ACNC: 2.32 M[IU]/L (ref 0.3–4.82)
WBC # BLD AUTO: 12.2 X10(3) UL (ref 4–11)

## 2019-01-01 PROCEDURE — 72148 MRI LUMBAR SPINE W/O DYE: CPT | Performed by: PHYSICAL MEDICINE & REHABILITATION

## 2019-01-01 PROCEDURE — 72125 CT NECK SPINE W/O DYE: CPT | Performed by: EMERGENCY MEDICINE

## 2019-01-01 PROCEDURE — 99215 OFFICE O/P EST HI 40 MIN: CPT | Performed by: INTERNAL MEDICINE

## 2019-01-01 PROCEDURE — 93294 REM INTERROG EVL PM/LDLS PM: CPT | Performed by: INTERNAL MEDICINE

## 2019-01-01 PROCEDURE — 99285 EMERGENCY DEPT VISIT HI MDM: CPT

## 2019-01-01 PROCEDURE — 85025 COMPLETE CBC W/AUTO DIFF WBC: CPT | Performed by: EMERGENCY MEDICINE

## 2019-01-01 PROCEDURE — 72131 CT LUMBAR SPINE W/O DYE: CPT | Performed by: EMERGENCY MEDICINE

## 2019-01-01 PROCEDURE — X1114 CARDIAC DEVICE HOME CHECK - REMOTE UNSCHEDULED: HCPCS | Performed by: INTERNAL MEDICINE

## 2019-01-01 PROCEDURE — 99214 OFFICE O/P EST MOD 30 MIN: CPT | Performed by: OTHER

## 2019-01-01 PROCEDURE — 36415 COLL VENOUS BLD VENIPUNCTURE: CPT

## 2019-01-01 PROCEDURE — 99213 OFFICE O/P EST LOW 20 MIN: CPT | Performed by: OTHER

## 2019-01-01 PROCEDURE — 36415 COLL VENOUS BLD VENIPUNCTURE: CPT | Performed by: INTERNAL MEDICINE

## 2019-01-01 PROCEDURE — 64644 CHEMODENERV 1 EXTREM 5/> MUS: CPT | Performed by: OTHER

## 2019-01-01 PROCEDURE — 84443 ASSAY THYROID STIM HORMONE: CPT | Performed by: INTERNAL MEDICINE

## 2019-01-01 PROCEDURE — G0180 MD CERTIFICATION HHA PATIENT: HCPCS

## 2019-01-01 PROCEDURE — 72141 MRI NECK SPINE W/O DYE: CPT | Performed by: PHYSICAL MEDICINE & REHABILITATION

## 2019-01-01 PROCEDURE — 82306 VITAMIN D 25 HYDROXY: CPT | Performed by: INTERNAL MEDICINE

## 2019-01-01 PROCEDURE — 80053 COMPREHEN METABOLIC PANEL: CPT | Performed by: EMERGENCY MEDICINE

## 2019-01-01 PROCEDURE — 83036 HEMOGLOBIN GLYCOSYLATED A1C: CPT | Performed by: INTERNAL MEDICINE

## 2019-01-01 RX ORDER — CLOPIDOGREL BISULFATE 75 MG/1
TABLET ORAL
Qty: 90 TABLET | Refills: 0 | Status: SHIPPED | OUTPATIENT
Start: 2019-01-01 | End: 2019-01-01 | Stop reason: HOSPADM

## 2019-01-01 RX ORDER — LABETALOL 200 MG/1
200 TABLET, FILM COATED ORAL 2 TIMES DAILY
Qty: 180 TABLET | Refills: 1 | Status: SHIPPED | OUTPATIENT
Start: 2019-01-01 | End: 2019-01-01 | Stop reason: HOSPADM

## 2019-01-01 RX ORDER — AMLODIPINE BESYLATE 5 MG/1
TABLET ORAL
Qty: 90 TABLET | Refills: 0 | OUTPATIENT
Start: 2019-01-01

## 2019-01-01 RX ORDER — GLIPIZIDE 5 MG/1
5 TABLET, FILM COATED, EXTENDED RELEASE ORAL DAILY
OUTPATIENT
Start: 2019-01-01

## 2019-01-01 RX ORDER — AMLODIPINE BESYLATE 10 MG/1
10 TABLET ORAL DAILY
Qty: 90 TABLET | Refills: 0 | Status: SHIPPED | OUTPATIENT
Start: 2019-01-01 | End: 2019-01-01 | Stop reason: SDUPTHER

## 2019-01-01 RX ORDER — ERGOCALCIFEROL 1.25 MG/1
50000 CAPSULE ORAL
OUTPATIENT
Start: 2019-01-01

## 2019-01-01 RX ORDER — LOSARTAN POTASSIUM 50 MG/1
100 TABLET ORAL DAILY
OUTPATIENT
Start: 2019-01-01

## 2019-01-01 RX ORDER — MAGNESIUM OXIDE 400 MG/1
400 TABLET ORAL 3 TIMES DAILY
OUTPATIENT
Start: 2019-01-01

## 2019-01-01 RX ORDER — GLIPIZIDE 5 MG/1
TABLET, FILM COATED, EXTENDED RELEASE ORAL
Qty: 90 TABLET | Refills: 0 | Status: SHIPPED | OUTPATIENT
Start: 2019-01-01 | End: 2019-01-01 | Stop reason: HOSPADM

## 2019-01-01 RX ORDER — FAMOTIDINE 20 MG/1
20 TABLET, FILM COATED ORAL DAILY
Qty: 90 TABLET | Refills: 0 | Status: SHIPPED | OUTPATIENT
Start: 2019-01-01 | End: 2019-01-01 | Stop reason: ALTCHOICE

## 2019-01-01 RX ORDER — FAMOTIDINE 20 MG/1
TABLET, FILM COATED ORAL
COMMUNITY
End: 2019-01-01 | Stop reason: SDUPTHER

## 2019-01-01 RX ORDER — LOSARTAN POTASSIUM 50 MG/1
TABLET ORAL
Qty: 180 TABLET | Refills: 0 | Status: SHIPPED | OUTPATIENT
Start: 2019-01-01 | End: 2019-01-01 | Stop reason: HOSPADM

## 2019-01-01 RX ORDER — PANTOPRAZOLE SODIUM 40 MG/1
TABLET, DELAYED RELEASE ORAL
Qty: 90 TABLET | Refills: 0 | Status: SHIPPED | OUTPATIENT
Start: 2019-01-01 | End: 2019-01-01 | Stop reason: ALTCHOICE

## 2019-01-01 RX ORDER — ASPIRIN 81 MG/1
81 TABLET, CHEWABLE ORAL DAILY
Qty: 90 TABLET | Refills: 3 | Status: SHIPPED | OUTPATIENT
Start: 2019-01-01 | End: 2019-01-01 | Stop reason: HOSPADM

## 2019-01-01 RX ORDER — POLYETHYLENE GLYCOL 3350 17 G/17G
POWDER, FOR SOLUTION ORAL
Qty: 7 EACH | Refills: 1 | Status: SHIPPED | OUTPATIENT
Start: 2019-01-01 | End: 2019-01-01 | Stop reason: ALTCHOICE

## 2019-01-01 RX ORDER — PANTOPRAZOLE SODIUM 40 MG/1
40 TABLET, DELAYED RELEASE ORAL DAILY
OUTPATIENT
Start: 2019-01-01

## 2019-01-01 RX ORDER — HYDROCHLOROTHIAZIDE 25 MG/1
25 TABLET ORAL DAILY
Qty: 90 TABLET | Refills: 0 | Status: SHIPPED | OUTPATIENT
Start: 2019-01-01 | End: 2019-01-01 | Stop reason: ALTCHOICE

## 2019-01-01 RX ORDER — CLOPIDOGREL BISULFATE 75 MG/1
75 TABLET ORAL DAILY
OUTPATIENT
Start: 2019-01-01

## 2019-01-01 RX ORDER — AMLODIPINE BESYLATE 5 MG/1
TABLET ORAL
Qty: 90 TABLET | Refills: 0 | Status: SHIPPED | OUTPATIENT
Start: 2019-01-01 | End: 2019-01-01 | Stop reason: SDUPTHER

## 2019-01-01 RX ORDER — PSEUDOEPHEDRINE HCL 30 MG
100 TABLET ORAL DAILY
Qty: 30 CAPSULE | Refills: 1 | Status: SHIPPED | OUTPATIENT
Start: 2019-01-01 | End: 2019-01-01 | Stop reason: HOSPADM

## 2019-01-01 RX ORDER — GABAPENTIN 100 MG/1
CAPSULE ORAL
OUTPATIENT
Start: 2019-01-01

## 2019-01-01 RX ORDER — GABAPENTIN 100 MG/1
CAPSULE ORAL
Qty: 270 CAPSULE | Refills: 0 | Status: SHIPPED | OUTPATIENT
Start: 2019-01-01 | End: 2019-01-01 | Stop reason: HOSPADM

## 2019-01-01 RX ORDER — PANTOPRAZOLE SODIUM 40 MG/1
TABLET, DELAYED RELEASE ORAL
Qty: 90 TABLET | Refills: 0 | OUTPATIENT
Start: 2019-01-01

## 2019-01-02 ENCOUNTER — TELEPHONE (OUTPATIENT)
Dept: OTOLARYNGOLOGY | Age: 74
End: 2019-01-02

## 2019-01-14 ENCOUNTER — OFFICE VISIT (OUTPATIENT)
Dept: OTOLARYNGOLOGY | Age: 74
End: 2019-01-14
Attending: INTERNAL MEDICINE

## 2019-01-14 VITALS — WEIGHT: 160 LBS | HEIGHT: 64 IN | BODY MASS INDEX: 27.31 KG/M2

## 2019-01-14 DIAGNOSIS — S01.312A LACERATION OF LEFT EARLOBE, INITIAL ENCOUNTER: Primary | ICD-10-CM

## 2019-01-14 PROCEDURE — 99203 OFFICE O/P NEW LOW 30 MIN: CPT | Performed by: OTOLARYNGOLOGY

## 2019-01-15 ENCOUNTER — TELEPHONE (OUTPATIENT)
Dept: OTOLARYNGOLOGY | Age: 74
End: 2019-01-15

## 2019-01-15 ENCOUNTER — PRIOR ORIGINAL RECORDS (OUTPATIENT)
Dept: OTHER | Age: 74
End: 2019-01-15

## 2019-01-16 ENCOUNTER — PRIOR ORIGINAL RECORDS (OUTPATIENT)
Dept: OTHER | Age: 74
End: 2019-01-16

## 2019-01-30 ENCOUNTER — TELEPHONE (OUTPATIENT)
Dept: INTERNAL MEDICINE | Age: 74
End: 2019-01-30

## 2019-02-08 DIAGNOSIS — E55.9 VITAMIN D DEFICIENCY: Primary | ICD-10-CM

## 2019-02-16 RX ORDER — ERGOCALCIFEROL 1.25 MG/1
CAPSULE ORAL
Qty: 4 CAPSULE | Refills: 0 | OUTPATIENT
Start: 2019-02-16

## 2019-02-25 ENCOUNTER — TELEPHONE (OUTPATIENT)
Dept: INTERNAL MEDICINE | Age: 74
End: 2019-02-25

## 2019-02-28 VITALS — HEART RATE: 68 BPM | DIASTOLIC BLOOD PRESSURE: 78 MMHG | SYSTOLIC BLOOD PRESSURE: 118 MMHG

## 2019-02-28 VITALS — HEART RATE: 68 BPM | SYSTOLIC BLOOD PRESSURE: 142 MMHG | DIASTOLIC BLOOD PRESSURE: 66 MMHG

## 2019-02-28 VITALS — HEART RATE: 67 BPM | DIASTOLIC BLOOD PRESSURE: 66 MMHG | SYSTOLIC BLOOD PRESSURE: 136 MMHG

## 2019-02-28 VITALS — HEART RATE: 74 BPM | DIASTOLIC BLOOD PRESSURE: 68 MMHG | SYSTOLIC BLOOD PRESSURE: 180 MMHG

## 2019-03-07 ENCOUNTER — TELEPHONE (OUTPATIENT)
Dept: CARDIOLOGY | Age: 74
End: 2019-03-07

## 2019-03-11 ENCOUNTER — TELEPHONE (OUTPATIENT)
Dept: INTERNAL MEDICINE | Age: 74
End: 2019-03-11

## 2019-03-11 DIAGNOSIS — Z86.73 STATUS POST CVA: Primary | ICD-10-CM

## 2019-03-14 ENCOUNTER — TELEPHONE (OUTPATIENT)
Dept: OTOLARYNGOLOGY | Age: 74
End: 2019-03-14

## 2019-03-14 ENCOUNTER — APPOINTMENT (OUTPATIENT)
Dept: INTERNAL MEDICINE | Age: 74
End: 2019-03-14

## 2019-03-18 ENCOUNTER — TELEPHONE (OUTPATIENT)
Dept: OTOLARYNGOLOGY | Age: 74
End: 2019-03-18

## 2019-03-20 ENCOUNTER — TELEPHONE (OUTPATIENT)
Dept: INTERNAL MEDICINE | Age: 74
End: 2019-03-20

## 2019-03-20 DIAGNOSIS — Z86.73 STATUS POST CVA: Primary | ICD-10-CM

## 2019-03-22 ENCOUNTER — TELEPHONE (OUTPATIENT)
Dept: INTERNAL MEDICINE | Age: 74
End: 2019-03-22

## 2019-03-22 DIAGNOSIS — Z86.73 STATUS POST CVA: Primary | ICD-10-CM

## 2019-03-25 ENCOUNTER — TELEPHONE (OUTPATIENT)
Dept: INTERNAL MEDICINE | Age: 74
End: 2019-03-25

## 2019-04-01 ENCOUNTER — TELEPHONE (OUTPATIENT)
Dept: OPHTHALMOLOGY | Age: 74
End: 2019-04-01

## 2019-06-25 PROBLEM — M75.101 ROTATOR CUFF SYNDROME OF RIGHT SHOULDER: Status: ACTIVE | Noted: 2019-01-01

## 2019-06-25 PROBLEM — M25.851 IMPINGEMENT SYNDROME, HIP, RIGHT: Status: ACTIVE | Noted: 2019-01-01

## 2019-06-25 PROBLEM — M19.011 OSTEOARTHRITIS OF RIGHT ACROMIOCLAVICULAR JOINT: Status: ACTIVE | Noted: 2019-01-01

## 2019-07-04 NOTE — ED PROVIDER NOTES
Patient Seen in: [facility] Emergency Department    History   Patient presents with:  Numbness Weakness (neurologic)    Stated Complaint: weakness to right side    HPI    74-year-old female with history of previous stroke presents with neck pain, we HPI.  Constitutional and vital signs reviewed. All other systems reviewed and negative except as noted above.     Physical Exam     ED Triage Vitals [07/03/19 1750]   BP (!) 218/63   Pulse 71   Resp 20   Temp 97.4 °F (36.3 °C)   Temp src    SpO2 99 % Abnormality         Status                     ---------                               -----------         ------                     CBC W/ DIFFERENTIAL[[id number]]          Abnormal            Final result                 Please view results for these criss facet arthropathy with suggestion of mild central canal stenosis along with mild to moderate bilateral neural foraminal and axillary recess stenosis at C5-6. 2. No acute process. Dictated by: Evangelista Rodriguez DO on 7/03/2019 at 21:22     Approved by:  Maricarmen Doshi, MDM   60-year-old female presents with progressively worsening weakness in bilateral upper and lower extremities. She also reports some neck pain and states she had a trauma to the area.   Her primary care doctors have ordered CT scans but when she r Medication List

## 2019-07-16 PROBLEM — I63.9 CVA (CEREBRAL VASCULAR ACCIDENT) (CMD): Status: ACTIVE | Noted: 2018-05-03

## 2019-07-16 PROBLEM — I10 ESSENTIAL HYPERTENSION: Status: ACTIVE | Noted: 2018-05-03

## 2019-07-16 PROBLEM — E78.00 PURE HYPERCHOLESTEROLEMIA: Status: ACTIVE | Noted: 2019-01-01

## 2019-07-16 PROBLEM — Z86.79 HISTORY OF COMPLETE HEART BLOCK: Status: ACTIVE | Noted: 2019-01-01

## 2019-07-16 NOTE — IMAGING NOTE
Patient here for MRI and has Medtronic pacer device. Representative Ángel Downing from [place] placed device in MRI safe mode. Pt tolerated procedure, VSS and following MRI device was placed back in original settings by representative. Pt released.

## 2019-07-16 NOTE — PROGRESS NOTES
Neurology Outpatient initial note    Bettyjane Schirmer : 1945   HPI:     Bettyjane Schirmer is a 69year old female who is being seen in neurologic evaluation. Patient being seen in evaluation for weakness, numbness, tingling.   Her daughter is present aspirin 81 MG Oral Chew Tab Chew 1 tablet (81 mg total) by mouth daily. Disp: 30 tablet Rfl: 0   docusate sodium 100 MG Oral Cap Take 100 mg by mouth 2 (two) times daily.  Disp: 60 capsule Rfl: 0   PEG 3350 Oral Powd Pack Take 17 g by mouth daily as neede fluent and appropriate, follows two-step commands       Cranial Nerves: pupils equal, round, and reactive to light; extraocular movements intact; facial sensation intact V1-3, face with left central facial weakness, hearing intact, no dysarthria or dysphon of MRI lumbar, cervical spine as ordered by physiatry    –With regards to stroke, I did discuss Botox for spasticity; she prefers to hold off    –For secondary stroke prevention, continue antiplatelet therapy; LDL goal less than 70; blood pressure control

## 2019-07-17 NOTE — TELEPHONE ENCOUNTER
Spoke with patient at length regarding her my chart message. Specifically, I did advise priority currently is to evaluate pelvic mass that was incidentally found on MRI lumbar spine.   Once this is done, she does still want to pursue physical therapy, I am

## 2019-07-17 NOTE — TELEPHONE ENCOUNTER
Regarding: Visit Follow-up Question  Contact: [phone]  ----- Message from Pino Marion MA sent at 7/15/2019  7:22 AM CDT -----       ----- Message from Ursula Guidry to Marilee Pedro MD sent at 7/13/2019  5:01 PM -----   Hi,      Thank you for seei

## 2019-07-17 NOTE — PROGRESS NOTES
Spoke with Sandra Harry about the results. Need an US pelvis asap. And gyne appt. We need to ask her if she can tolerate CT w contrast of abd/pelvis as well.

## 2019-12-16 NOTE — TELEPHONE ENCOUNTER
Medicare Online for insurance coverage of Botox 200 u/vl x 2 cpt codes [facility], [id number], [id number], [id number]. Insurance was verified and procedure  is a covered benefit and does not require authorization  Will call Pt. To inform.    Per Dr. [facility], pt. is scheduled

## 2019-12-16 NOTE — TELEPHONE ENCOUNTER
From: Amarilis Wetzel  To: Lizet Laurent MD  Sent: 12/13/2019 1:00 PM CST  Subject: Non-Urgent Medical Question    Hi again,   Just to be clear, appointments need to be after noon.    I also had to erase loads of the prior message words as it went over the 5

## 2019-12-17 NOTE — TELEPHONE ENCOUNTER
Remainder of patient's questions answered by referrals and authorizations team.  Patient will come in for a visit to reevaluate for Botox, specifically muscles to be injected and number of units.

## 2019-12-19 NOTE — TELEPHONE ENCOUNTER
See telephone encounter from 12/16/19. Will call Pt. to schedule appt. Pt. is scheduled for Botox injections on 12/23/19 at 1:30 pm. Instructed to hold Plavix am of procedure. Continue Aspirin as is. Pt. agreed.

## 2019-12-19 NOTE — TELEPHONE ENCOUNTER
Pending approval, can schedule pt for botox injections Monday 12/23, after 1:30 pm.  Patient should be advised hold Plavix am of procedure, continue aspirin as is.

## 2019-12-19 NOTE — PROGRESS NOTES
[facility]    Dianna Allred is a 69year old female. HPI:     Patient is being seen in follow-up. She comes alone to the visit today. I saw her in clinic last in July.     Since last visit, she has decided she would like to (50 mg total) by mouth 2 (two) times daily. 180 tablet 1   • Clopidogrel Bisulfate 75 MG Oral Tab Take 1 tablet (75 mg total) by mouth daily. 90 tablet 1   • aspirin 81 MG Oral Chew Tab Chew 1 tablet (81 mg total) by mouth daily.  30 tablet 0   • docusate s There is a prominent right foraminal disc protrusion at C6-C7 which may impinge the right C7 nerve root. Clinical correlation for right C7   radiculopathy is suggested. Severe right foraminal narrowing is seen at C5-C6.   There are multiple levels of mild thinners do increase the risk for bleeding during the procedure (albeit injections are mostly at compressible sites); particularly as we will be using EMG guidance for procedure, I will advise patient to hold her Plavix the morning of procedure, resume the

## 2019-12-25 NOTE — PROCEDURES
Botox Injection Procedure Note    Consent:    Risks, benefits, and alternatives of botulinum toxin injections were discussed with patient in detail, risks including but not limited to pain, eyelid weakness, double vision, difficulty swallowing, difficulty

## 2019-12-30 NOTE — TELEPHONE ENCOUNTER
Medicare Online for insurance coverage of Botox injections 200 u/vl x 2 cpt codes , [id number], [id number], [id number]  Insurance was verified and procedure is a covered benefit and does not require authorization. Due around 03/23/20. Will call Pt. to inform.  Pt. I

## 2020-01-01 ENCOUNTER — MED REC SCAN ONLY (OUTPATIENT)
Dept: NEUROLOGY | Facility: CLINIC | Age: 75
End: 2020-01-01

## 2020-01-01 ENCOUNTER — E-ADVICE (OUTPATIENT)
Dept: CARDIOLOGY | Age: 75
End: 2020-01-01

## 2020-01-01 ENCOUNTER — ANESTHESIA (OUTPATIENT)
Dept: SURGERY | Facility: HOSPITAL | Age: 75
DRG: 907 | End: 2020-01-01
Payer: MEDICARE

## 2020-01-01 ENCOUNTER — OFFICE VISIT (OUTPATIENT)
Dept: CARDIOLOGY | Age: 75
End: 2020-01-01

## 2020-01-01 ENCOUNTER — APPOINTMENT (OUTPATIENT)
Dept: GENERAL RADIOLOGY | Facility: HOSPITAL | Age: 75
DRG: 742 | End: 2020-01-01
Attending: INTERNAL MEDICINE
Payer: MEDICARE

## 2020-01-01 ENCOUNTER — HOSPITAL ENCOUNTER (INPATIENT)
Facility: HOSPITAL | Age: 75
LOS: 33 days | DRG: 907 | End: 2020-01-01
Attending: EMERGENCY MEDICINE | Admitting: INTERNAL MEDICINE
Payer: MEDICARE

## 2020-01-01 ENCOUNTER — APPOINTMENT (OUTPATIENT)
Dept: GENERAL RADIOLOGY | Facility: HOSPITAL | Age: 75
DRG: 907 | End: 2020-01-01
Attending: NURSE PRACTITIONER
Payer: MEDICARE

## 2020-01-01 ENCOUNTER — TELEPHONE (OUTPATIENT)
Dept: NEUROLOGY | Facility: CLINIC | Age: 75
End: 2020-01-01

## 2020-01-01 ENCOUNTER — APPOINTMENT (OUTPATIENT)
Dept: GENERAL RADIOLOGY | Facility: HOSPITAL | Age: 75
DRG: 907 | End: 2020-01-01
Attending: INTERNAL MEDICINE
Payer: MEDICARE

## 2020-01-01 ENCOUNTER — OFFICE VISIT (OUTPATIENT)
Dept: NEUROLOGY | Facility: CLINIC | Age: 75
End: 2020-01-01
Payer: MEDICARE

## 2020-01-01 ENCOUNTER — ANESTHESIA (OUTPATIENT)
Dept: SURGERY | Facility: HOSPITAL | Age: 75
DRG: 742 | End: 2020-01-01
Payer: MEDICARE

## 2020-01-01 ENCOUNTER — ANESTHESIA EVENT (OUTPATIENT)
Dept: SURGERY | Facility: HOSPITAL | Age: 75
DRG: 742 | End: 2020-01-01
Payer: MEDICARE

## 2020-01-01 ENCOUNTER — APPOINTMENT (OUTPATIENT)
Dept: ULTRASOUND IMAGING | Facility: HOSPITAL | Age: 75
DRG: 907 | End: 2020-01-01
Attending: INTERNAL MEDICINE
Payer: MEDICARE

## 2020-01-01 ENCOUNTER — APPOINTMENT (OUTPATIENT)
Dept: CT IMAGING | Facility: HOSPITAL | Age: 75
DRG: 907 | End: 2020-01-01
Attending: INTERNAL MEDICINE
Payer: MEDICARE

## 2020-01-01 ENCOUNTER — PATIENT MESSAGE (OUTPATIENT)
Dept: NEUROLOGY | Facility: CLINIC | Age: 75
End: 2020-01-01

## 2020-01-01 ENCOUNTER — TELEPHONE (OUTPATIENT)
Dept: CARDIOLOGY | Age: 75
End: 2020-01-01

## 2020-01-01 ENCOUNTER — APPOINTMENT (OUTPATIENT)
Dept: CT IMAGING | Facility: HOSPITAL | Age: 75
DRG: 742 | End: 2020-01-01
Attending: INTERNAL MEDICINE
Payer: MEDICARE

## 2020-01-01 ENCOUNTER — APPOINTMENT (OUTPATIENT)
Dept: CV DIAGNOSTICS | Facility: HOSPITAL | Age: 75
DRG: 742 | End: 2020-01-01
Attending: INTERNAL MEDICINE
Payer: MEDICARE

## 2020-01-01 ENCOUNTER — ANESTHESIA (OUTPATIENT)
Dept: PERIOP | Facility: HOSPITAL | Age: 75
DRG: 907 | End: 2020-01-01
Payer: MEDICARE

## 2020-01-01 ENCOUNTER — APPOINTMENT (OUTPATIENT)
Dept: NUCLEAR MEDICINE | Facility: HOSPITAL | Age: 75
DRG: 907 | End: 2020-01-01
Attending: HOSPITALIST
Payer: MEDICARE

## 2020-01-01 ENCOUNTER — HOSPITAL ENCOUNTER (OUTPATIENT)
Dept: CV DIAGNOSTICS | Facility: HOSPITAL | Age: 75
Discharge: HOME | End: 2020-01-01
Attending: NURSE PRACTITIONER
Payer: MEDICARE

## 2020-01-01 ENCOUNTER — DOCUMENTATION (OUTPATIENT)
Dept: CARDIOLOGY | Age: 75
End: 2020-01-01

## 2020-01-01 ENCOUNTER — HOSPITAL ENCOUNTER (INPATIENT)
Facility: HOSPITAL | Age: 75
LOS: 22 days | Discharge: NURSING, REHAB OR LONG-TERM CARE | DRG: 742 | End: 2020-01-01
Attending: INTERNAL MEDICINE | Admitting: INTERNAL MEDICINE
Payer: MEDICARE

## 2020-01-01 ENCOUNTER — APPOINTMENT (OUTPATIENT)
Dept: CT IMAGING | Facility: HOSPITAL | Age: 75
DRG: 907 | End: 2020-01-01
Attending: HOSPITALIST
Payer: MEDICARE

## 2020-01-01 ENCOUNTER — APPOINTMENT (OUTPATIENT)
Dept: MRI IMAGING | Facility: HOSPITAL | Age: 75
DRG: 742 | End: 2020-01-01
Attending: Other
Payer: MEDICARE

## 2020-01-01 ENCOUNTER — APPOINTMENT (OUTPATIENT)
Dept: INTERVENTIONAL RADIOLOGY/VASCULAR | Facility: HOSPITAL | Age: 75
DRG: 742 | End: 2020-01-01
Attending: INTERNAL MEDICINE
Payer: MEDICARE

## 2020-01-01 ENCOUNTER — APPOINTMENT (OUTPATIENT)
Dept: CT IMAGING | Facility: HOSPITAL | Age: 75
DRG: 742 | End: 2020-01-01
Attending: SURGERY
Payer: MEDICARE

## 2020-01-01 ENCOUNTER — ANESTHESIA EVENT (OUTPATIENT)
Dept: PERIOP | Facility: HOSPITAL | Age: 75
DRG: 907 | End: 2020-01-01
Payer: MEDICARE

## 2020-01-01 ENCOUNTER — ANESTHESIA EVENT (OUTPATIENT)
Dept: SURGERY | Facility: HOSPITAL | Age: 75
DRG: 907 | End: 2020-01-01
Payer: MEDICARE

## 2020-01-01 ENCOUNTER — APPOINTMENT (OUTPATIENT)
Dept: MRI IMAGING | Facility: HOSPITAL | Age: 75
DRG: 742 | End: 2020-01-01
Attending: NURSE PRACTITIONER
Payer: MEDICARE

## 2020-01-01 ENCOUNTER — APPOINTMENT (OUTPATIENT)
Dept: CT IMAGING | Facility: HOSPITAL | Age: 75
DRG: 907 | End: 2020-01-01
Attending: OBSTETRICS & GYNECOLOGY
Payer: MEDICARE

## 2020-01-01 ENCOUNTER — APPOINTMENT (OUTPATIENT)
Dept: GENERAL RADIOLOGY | Facility: HOSPITAL | Age: 75
DRG: 907 | End: 2020-01-01
Attending: HOSPITALIST
Payer: MEDICARE

## 2020-01-01 VITALS
BODY MASS INDEX: 30.3 KG/M2 | RESPIRATION RATE: 10 BRPM | WEIGHT: 177.5 LBS | OXYGEN SATURATION: 90 % | HEIGHT: 64.02 IN | SYSTOLIC BLOOD PRESSURE: 94 MMHG | TEMPERATURE: 97 F | HEART RATE: 123 BPM | DIASTOLIC BLOOD PRESSURE: 53 MMHG

## 2020-01-01 VITALS
HEART RATE: 94 BPM | WEIGHT: 182.75 LBS | BODY MASS INDEX: 31.2 KG/M2 | SYSTOLIC BLOOD PRESSURE: 151 MMHG | RESPIRATION RATE: 18 BRPM | DIASTOLIC BLOOD PRESSURE: 67 MMHG | TEMPERATURE: 98 F | HEIGHT: 64 IN | OXYGEN SATURATION: 96 %

## 2020-01-01 VITALS
HEIGHT: 64 IN | WEIGHT: 168 LBS | HEART RATE: 68 BPM | BODY MASS INDEX: 28.68 KG/M2 | SYSTOLIC BLOOD PRESSURE: 158 MMHG | DIASTOLIC BLOOD PRESSURE: 78 MMHG

## 2020-01-01 VITALS — HEIGHT: 64 IN | BODY MASS INDEX: 28 KG/M2

## 2020-01-01 DIAGNOSIS — D64.9 SYMPTOMATIC ANEMIA: Primary | ICD-10-CM

## 2020-01-01 DIAGNOSIS — Z01.810 PRE-OPERATIVE CARDIOVASCULAR EXAMINATION: ICD-10-CM

## 2020-01-01 DIAGNOSIS — M54.2 NECK PAIN: Primary | ICD-10-CM

## 2020-01-01 DIAGNOSIS — G81.94 LEFT HEMIPARESIS (HCC): ICD-10-CM

## 2020-01-01 DIAGNOSIS — Z01.810 PREOPERATIVE CARDIOVASCULAR EXAMINATION: Primary | ICD-10-CM

## 2020-01-01 DIAGNOSIS — M48.062 SPINAL STENOSIS OF LUMBAR REGION WITH NEUROGENIC CLAUDICATION: ICD-10-CM

## 2020-01-01 DIAGNOSIS — I10 ESSENTIAL HYPERTENSION: ICD-10-CM

## 2020-01-01 DIAGNOSIS — I63.9 CEREBROVASCULAR ACCIDENT (CVA), UNSPECIFIED MECHANISM (CMD): ICD-10-CM

## 2020-01-01 DIAGNOSIS — I63.9 CEREBROVASCULAR ACCIDENT (CVA), UNSPECIFIED MECHANISM (HCC): ICD-10-CM

## 2020-01-01 DIAGNOSIS — N93.9 VAGINAL BLEEDING: ICD-10-CM

## 2020-01-01 DIAGNOSIS — E78.00 PURE HYPERCHOLESTEROLEMIA: ICD-10-CM

## 2020-01-01 DIAGNOSIS — Z86.79 HISTORY OF COMPLETE HEART BLOCK: ICD-10-CM

## 2020-01-01 DIAGNOSIS — E83.42 HYPOMAGNESEMIA: ICD-10-CM

## 2020-01-01 LAB
ALBUMIN SERPL-MCNC: 2.7 G/DL (ref 3.4–5)
ALBUMIN SERPL-MCNC: 2.9 G/DL (ref 3.4–5)
ALBUMIN SERPL-MCNC: 3.3 G/DL (ref 3.4–5)
ALBUMIN/GLOB SERPL: 0.8 {RATIO} (ref 1–2)
ALBUMIN/GLOB SERPL: 0.8 {RATIO} (ref 1–2)
ALBUMIN/GLOB SERPL: 0.9 {RATIO} (ref 1–2)
ALLENS TEST: POSITIVE
ALP LIVER SERPL-CCNC: 114 U/L (ref 55–142)
ALP LIVER SERPL-CCNC: 83 U/L (ref 55–142)
ALP LIVER SERPL-CCNC: 86 U/L (ref 55–142)
ALT SERPL-CCNC: 18 U/L (ref 13–56)
ALT SERPL-CCNC: 21 U/L (ref 13–56)
ALT SERPL-CCNC: 24 U/L (ref 13–56)
AMMONIA PLAS-MCNC: <10 UMOL/L (ref 11–32)
ANION GAP SERPL CALC-SCNC: 3 MMOL/L (ref 0–18)
ANION GAP SERPL CALC-SCNC: 3 MMOL/L (ref 0–18)
ANION GAP SERPL CALC-SCNC: 4 MMOL/L (ref 0–18)
ANION GAP SERPL CALC-SCNC: 4 MMOL/L (ref 0–18)
ANION GAP SERPL CALC-SCNC: 5 MMOL/L (ref 0–18)
ANION GAP SERPL CALC-SCNC: 6 MMOL/L (ref 0–18)
ANION GAP SERPL CALC-SCNC: 6 MMOL/L (ref 0–18)
ANION GAP SERPL CALC-SCNC: 7 MMOL/L (ref 0–18)
ANION GAP SERPL CALC-SCNC: 7 MMOL/L (ref 0–18)
ANION GAP SERPL CALC-SCNC: 8 MMOL/L (ref 0–18)
ANION GAP SERPL CALC-SCNC: 9 MMOL/L (ref 0–18)
ANION GAP SERPL CALC-SCNC: 9 MMOL/L (ref 0–18)
APTT PPP: 101.9 SECONDS (ref 25.4–36.1)
APTT PPP: 120.3 SECONDS (ref 25.4–36.1)
APTT PPP: 25.1 SECONDS (ref 25.4–36.1)
APTT PPP: 25.8 SECONDS (ref 25.4–36.1)
APTT PPP: 26.3 SECONDS (ref 25.4–36.1)
APTT PPP: 28.6 SECONDS (ref 25.4–36.1)
APTT PPP: 36.6 SECONDS (ref 25.4–36.1)
APTT PPP: 38.9 SECONDS (ref 25.4–36.1)
APTT PPP: 39.2 SECONDS (ref 25.4–36.1)
APTT PPP: 42.9 SECONDS (ref 25.4–36.1)
APTT PPP: 47.6 SECONDS (ref 25.4–36.1)
APTT PPP: 48.6 SECONDS (ref 25.4–36.1)
APTT PPP: 49.5 SECONDS (ref 25.4–36.1)
APTT PPP: 51.5 SECONDS (ref 25.4–36.1)
APTT PPP: 52.3 SECONDS (ref 25.4–36.1)
APTT PPP: 52.4 SECONDS (ref 25.4–36.1)
APTT PPP: 53.5 SECONDS (ref 25.4–36.1)
APTT PPP: 55.5 SECONDS (ref 25.4–36.1)
APTT PPP: 63.6 SECONDS (ref 25.4–36.1)
APTT PPP: 72.3 SECONDS (ref 25.4–36.1)
APTT PPP: 76.4 SECONDS (ref 25.4–36.1)
APTT PPP: 89.6 SECONDS (ref 25.4–36.1)
APTT PPP: 94.2 SECONDS (ref 25.4–36.1)
ARTERIAL BLD GAS O2 SATURATION: 95 % (ref 92–100)
ARTERIAL BLOOD GAS BASE EXCESS: -2.3 MMOL/L (ref ?–2)
ARTERIAL BLOOD GAS HCO3: 21.8 MEQ/L (ref 22–26)
ARTERIAL BLOOD GAS PCO2: 36 MM HG (ref 35–45)
ARTERIAL BLOOD GAS PH: 7.41 (ref 7.35–7.45)
ARTERIAL BLOOD GAS PO2: 81 MM HG (ref 80–105)
AST SERPL-CCNC: 105 U/L (ref 15–37)
AST SERPL-CCNC: 107 U/L (ref 15–37)
AST SERPL-CCNC: 15 U/L (ref 15–37)
ATRIAL RATE: 103 BPM
ATRIAL RATE: 107 BPM
ATRIAL RATE: 113 BPM
ATRIAL RATE: 98 BPM
BASOPHILS # BLD AUTO: 0.01 X10(3) UL (ref 0–0.2)
BASOPHILS # BLD AUTO: 0.02 X10(3) UL (ref 0–0.2)
BASOPHILS # BLD AUTO: 0.03 X10(3) UL (ref 0–0.2)
BASOPHILS # BLD AUTO: 0.05 X10(3) UL (ref 0–0.2)
BASOPHILS # BLD AUTO: 0.06 X10(3) UL (ref 0–0.2)
BASOPHILS # BLD AUTO: 0.07 X10(3) UL (ref 0–0.2)
BASOPHILS # BLD AUTO: 0.1 X10(3) UL (ref 0–0.2)
BASOPHILS NFR BLD AUTO: 0.1 %
BASOPHILS NFR BLD AUTO: 0.2 %
BASOPHILS NFR BLD AUTO: 0.3 %
BASOPHILS NFR BLD AUTO: 0.3 %
BASOPHILS NFR BLD AUTO: 0.4 %
BILIRUB SERPL-MCNC: 0.6 MG/DL (ref 0.1–2)
BILIRUB SERPL-MCNC: 0.7 MG/DL (ref 0.1–2)
BILIRUB SERPL-MCNC: 0.9 MG/DL (ref 0.1–2)
BILIRUB UR QL STRIP.AUTO: NEGATIVE
BILIRUB UR QL STRIP.AUTO: NEGATIVE
BUN BLD-MCNC: 23 MG/DL (ref 7–18)
BUN BLD-MCNC: 25 MG/DL (ref 7–18)
BUN BLD-MCNC: 26 MG/DL (ref 7–18)
BUN BLD-MCNC: 28 MG/DL (ref 7–18)
BUN BLD-MCNC: 28 MG/DL (ref 7–18)
BUN BLD-MCNC: 29 MG/DL (ref 7–18)
BUN BLD-MCNC: 29 MG/DL (ref 7–18)
BUN BLD-MCNC: 32 MG/DL (ref 7–18)
BUN BLD-MCNC: 33 MG/DL (ref 7–18)
BUN BLD-MCNC: 34 MG/DL (ref 7–18)
BUN BLD-MCNC: 35 MG/DL (ref 7–18)
BUN BLD-MCNC: 36 MG/DL (ref 7–18)
BUN BLD-MCNC: 38 MG/DL (ref 7–18)
BUN BLD-MCNC: 39 MG/DL (ref 7–18)
BUN BLD-MCNC: 39 MG/DL (ref 7–18)
BUN BLD-MCNC: 45 MG/DL (ref 7–18)
BUN BLD-MCNC: 46 MG/DL (ref 7–18)
BUN BLD-MCNC: 50 MG/DL (ref 7–18)
BUN/CREAT SERPL: 18.7 (ref 10–20)
BUN/CREAT SERPL: 19.1 (ref 10–20)
BUN/CREAT SERPL: 19.5 (ref 10–20)
BUN/CREAT SERPL: 23 (ref 10–20)
BUN/CREAT SERPL: 24.3 (ref 10–20)
BUN/CREAT SERPL: 24.3 (ref 10–20)
BUN/CREAT SERPL: 24.4 (ref 10–20)
BUN/CREAT SERPL: 24.8 (ref 10–20)
BUN/CREAT SERPL: 25.6 (ref 10–20)
BUN/CREAT SERPL: 27.2 (ref 10–20)
BUN/CREAT SERPL: 28.7 (ref 10–20)
BUN/CREAT SERPL: 29.5 (ref 10–20)
BUN/CREAT SERPL: 29.6 (ref 10–20)
BUN/CREAT SERPL: 30.8 (ref 10–20)
BUN/CREAT SERPL: 31.2 (ref 10–20)
BUN/CREAT SERPL: 33.1 (ref 10–20)
BUN/CREAT SERPL: 34.5 (ref 10–20)
BUN/CREAT SERPL: 38 (ref 10–20)
C-1-ESTERASE INHIBITOR, FUNCTI: 103 %
C-1-ESTERASE INHIBITOR: 40 MG/DL
C4 SERPL-MCNC: 42.7 MG/DL (ref 10–40)
CALCIUM BLD-MCNC: 8.2 MG/DL (ref 8.5–10.1)
CALCIUM BLD-MCNC: 8.2 MG/DL (ref 8.5–10.1)
CALCIUM BLD-MCNC: 8.3 MG/DL (ref 8.5–10.1)
CALCIUM BLD-MCNC: 8.4 MG/DL (ref 8.5–10.1)
CALCIUM BLD-MCNC: 8.4 MG/DL (ref 8.5–10.1)
CALCIUM BLD-MCNC: 8.5 MG/DL (ref 8.5–10.1)
CALCIUM BLD-MCNC: 8.5 MG/DL (ref 8.5–10.1)
CALCIUM BLD-MCNC: 8.6 MG/DL (ref 8.5–10.1)
CALCIUM BLD-MCNC: 8.7 MG/DL (ref 8.5–10.1)
CALCIUM BLD-MCNC: 8.7 MG/DL (ref 8.5–10.1)
CALCIUM BLD-MCNC: 8.8 MG/DL (ref 8.5–10.1)
CALCIUM BLD-MCNC: 8.9 MG/DL (ref 8.5–10.1)
CALCIUM BLD-MCNC: 9 MG/DL (ref 8.5–10.1)
CALCULATED O2 SATURATION: 96 % (ref 92–100)
CARBOXYHEMOGLOBIN: 1.6 % SAT (ref 0–3)
CHLORIDE SERPL-SCNC: 105 MMOL/L (ref 98–112)
CHLORIDE SERPL-SCNC: 106 MMOL/L (ref 98–112)
CHLORIDE SERPL-SCNC: 108 MMOL/L (ref 98–112)
CHLORIDE SERPL-SCNC: 109 MMOL/L (ref 98–112)
CHLORIDE SERPL-SCNC: 110 MMOL/L (ref 98–112)
CHLORIDE SERPL-SCNC: 111 MMOL/L (ref 98–112)
CHLORIDE SERPL-SCNC: 111 MMOL/L (ref 98–112)
CHLORIDE SERPL-SCNC: 112 MMOL/L (ref 98–112)
CHLORIDE SERPL-SCNC: 112 MMOL/L (ref 98–112)
CHLORIDE SERPL-SCNC: 113 MMOL/L (ref 98–112)
CHLORIDE SERPL-SCNC: 114 MMOL/L (ref 98–112)
CHLORIDE SERPL-SCNC: 115 MMOL/L (ref 98–112)
CHLORIDE SERPL-SCNC: 116 MMOL/L (ref 98–112)
CHLORIDE SERPL-SCNC: 118 MMOL/L (ref 98–112)
CHOLEST SMN-MCNC: 208 MG/DL (ref ?–200)
CLARITY UR REFRACT.AUTO: CLEAR
CO2 SERPL-SCNC: 20 MMOL/L (ref 21–32)
CO2 SERPL-SCNC: 21 MMOL/L (ref 21–32)
CO2 SERPL-SCNC: 22 MMOL/L (ref 21–32)
CO2 SERPL-SCNC: 22 MMOL/L (ref 21–32)
CO2 SERPL-SCNC: 23 MMOL/L (ref 21–32)
CO2 SERPL-SCNC: 23 MMOL/L (ref 21–32)
CO2 SERPL-SCNC: 25 MMOL/L (ref 21–32)
CO2 SERPL-SCNC: 26 MMOL/L (ref 21–32)
CO2 SERPL-SCNC: 27 MMOL/L (ref 21–32)
CO2 SERPL-SCNC: 28 MMOL/L (ref 21–32)
CO2 SERPL-SCNC: 29 MMOL/L (ref 21–32)
COLOR UR AUTO: YELLOW
COLOR UR AUTO: YELLOW
COMPLEMENT COMPONENT 2: 1.9 MG/DL
COMPLEMENT COMPONENT 4: 46 MG/DL
CREAT BLD-MCNC: 1.1 MG/DL (ref 0.55–1.02)
CREAT BLD-MCNC: 1.15 MG/DL (ref 0.55–1.02)
CREAT BLD-MCNC: 1.15 MG/DL (ref 0.55–1.02)
CREAT BLD-MCNC: 1.17 MG/DL (ref 0.55–1.02)
CREAT BLD-MCNC: 1.17 MG/DL (ref 0.55–1.02)
CREAT BLD-MCNC: 1.18 MG/DL (ref 0.55–1.02)
CREAT BLD-MCNC: 1.19 MG/DL (ref 0.55–1.02)
CREAT BLD-MCNC: 1.21 MG/DL (ref 0.55–1.02)
CREAT BLD-MCNC: 1.22 MG/DL (ref 0.55–1.02)
CREAT BLD-MCNC: 1.25 MG/DL (ref 0.55–1.02)
CREAT BLD-MCNC: 1.32 MG/DL (ref 0.55–1.02)
CREAT BLD-MCNC: 1.34 MG/DL (ref 0.55–1.02)
CREAT BLD-MCNC: 1.36 MG/DL (ref 0.55–1.02)
CREAT BLD-MCNC: 1.44 MG/DL (ref 0.55–1.02)
CREAT BLD-MCNC: 1.51 MG/DL (ref 0.55–1.02)
CREAT BLD-MCNC: 1.52 MG/DL (ref 0.55–1.02)
DEPRECATED RDW RBC AUTO: 40.3 FL (ref 35.1–46.3)
DEPRECATED RDW RBC AUTO: 40.6 FL (ref 35.1–46.3)
DEPRECATED RDW RBC AUTO: 42.7 FL (ref 35.1–46.3)
DEPRECATED RDW RBC AUTO: 42.7 FL (ref 35.1–46.3)
DEPRECATED RDW RBC AUTO: 43.1 FL (ref 35.1–46.3)
DEPRECATED RDW RBC AUTO: 43.1 FL (ref 35.1–46.3)
DEPRECATED RDW RBC AUTO: 43.4 FL (ref 35.1–46.3)
DEPRECATED RDW RBC AUTO: 43.5 FL (ref 35.1–46.3)
DEPRECATED RDW RBC AUTO: 43.8 FL (ref 35.1–46.3)
DEPRECATED RDW RBC AUTO: 43.8 FL (ref 35.1–46.3)
DEPRECATED RDW RBC AUTO: 43.9 FL (ref 35.1–46.3)
DEPRECATED RDW RBC AUTO: 44.2 FL (ref 35.1–46.3)
DEPRECATED RDW RBC AUTO: 45.1 FL (ref 35.1–46.3)
DEPRECATED RDW RBC AUTO: 45.1 FL (ref 35.1–46.3)
DEPRECATED RDW RBC AUTO: 45.2 FL (ref 35.1–46.3)
DEPRECATED RDW RBC AUTO: 45.6 FL (ref 35.1–46.3)
DEPRECATED RDW RBC AUTO: 46 FL (ref 35.1–46.3)
DEPRECATED RDW RBC AUTO: 46.5 FL (ref 35.1–46.3)
DEPRECATED RDW RBC AUTO: 46.7 FL (ref 35.1–46.3)
EOSINOPHIL # BLD AUTO: 0 X10(3) UL (ref 0–0.7)
EOSINOPHIL # BLD AUTO: 0.01 X10(3) UL (ref 0–0.7)
EOSINOPHIL # BLD AUTO: 0.06 X10(3) UL (ref 0–0.7)
EOSINOPHIL # BLD AUTO: 0.11 X10(3) UL (ref 0–0.7)
EOSINOPHIL # BLD AUTO: 0.16 X10(3) UL (ref 0–0.7)
EOSINOPHIL # BLD AUTO: 0.22 X10(3) UL (ref 0–0.7)
EOSINOPHIL # BLD AUTO: 0.23 X10(3) UL (ref 0–0.7)
EOSINOPHIL # BLD AUTO: 0.27 X10(3) UL (ref 0–0.7)
EOSINOPHIL # BLD AUTO: 0.3 X10(3) UL (ref 0–0.7)
EOSINOPHIL # BLD AUTO: 0.3 X10(3) UL (ref 0–0.7)
EOSINOPHIL # BLD AUTO: 0.35 X10(3) UL (ref 0–0.7)
EOSINOPHIL # BLD AUTO: 0.46 X10(3) UL (ref 0–0.7)
EOSINOPHIL # BLD AUTO: 0.5 X10(3) UL (ref 0–0.7)
EOSINOPHIL NFR BLD AUTO: 0 %
EOSINOPHIL NFR BLD AUTO: 0 %
EOSINOPHIL NFR BLD AUTO: 0.3 %
EOSINOPHIL NFR BLD AUTO: 0.5 %
EOSINOPHIL NFR BLD AUTO: 1.1 %
EOSINOPHIL NFR BLD AUTO: 1.5 %
EOSINOPHIL NFR BLD AUTO: 1.8 %
EOSINOPHIL NFR BLD AUTO: 1.9 %
EOSINOPHIL NFR BLD AUTO: 2.2 %
EOSINOPHIL NFR BLD AUTO: 2.3 %
EOSINOPHIL NFR BLD AUTO: 2.3 %
EOSINOPHIL NFR BLD AUTO: 2.4 %
EOSINOPHIL NFR BLD AUTO: 3.4 %
ERYTHROCYTE [DISTWIDTH] IN BLOOD BY AUTOMATED COUNT: 12.5 % (ref 11–15)
ERYTHROCYTE [DISTWIDTH] IN BLOOD BY AUTOMATED COUNT: 12.6 % (ref 11–15)
ERYTHROCYTE [DISTWIDTH] IN BLOOD BY AUTOMATED COUNT: 13 % (ref 11–15)
ERYTHROCYTE [DISTWIDTH] IN BLOOD BY AUTOMATED COUNT: 13 % (ref 11–15)
ERYTHROCYTE [DISTWIDTH] IN BLOOD BY AUTOMATED COUNT: 13.1 % (ref 11–15)
ERYTHROCYTE [DISTWIDTH] IN BLOOD BY AUTOMATED COUNT: 13.2 % (ref 11–15)
ERYTHROCYTE [DISTWIDTH] IN BLOOD BY AUTOMATED COUNT: 13.4 % (ref 11–15)
ERYTHROCYTE [DISTWIDTH] IN BLOOD BY AUTOMATED COUNT: 13.5 % (ref 11–15)
ERYTHROCYTE [DISTWIDTH] IN BLOOD BY AUTOMATED COUNT: 13.6 % (ref 11–15)
ERYTHROCYTE [DISTWIDTH] IN BLOOD BY AUTOMATED COUNT: 13.6 % (ref 11–15)
ERYTHROCYTE [DISTWIDTH] IN BLOOD BY AUTOMATED COUNT: 13.7 % (ref 11–15)
ERYTHROCYTE [DISTWIDTH] IN BLOOD BY AUTOMATED COUNT: 13.7 % (ref 11–15)
ERYTHROCYTE [DISTWIDTH] IN BLOOD BY AUTOMATED COUNT: 13.8 % (ref 11–15)
ERYTHROCYTE [DISTWIDTH] IN BLOOD BY AUTOMATED COUNT: 14 % (ref 11–15)
GLOBULIN PLAS-MCNC: 3.5 G/DL (ref 2.8–4.4)
GLOBULIN PLAS-MCNC: 3.7 G/DL (ref 2.8–4.4)
GLOBULIN PLAS-MCNC: 3.7 G/DL (ref 2.8–4.4)
GLUCOSE BLD-MCNC: 101 MG/DL (ref 70–99)
GLUCOSE BLD-MCNC: 105 MG/DL (ref 70–99)
GLUCOSE BLD-MCNC: 105 MG/DL (ref 70–99)
GLUCOSE BLD-MCNC: 110 MG/DL (ref 70–99)
GLUCOSE BLD-MCNC: 119 MG/DL (ref 70–99)
GLUCOSE BLD-MCNC: 119 MG/DL (ref 70–99)
GLUCOSE BLD-MCNC: 123 MG/DL (ref 70–99)
GLUCOSE BLD-MCNC: 127 MG/DL (ref 70–99)
GLUCOSE BLD-MCNC: 128 MG/DL (ref 70–99)
GLUCOSE BLD-MCNC: 129 MG/DL (ref 70–99)
GLUCOSE BLD-MCNC: 133 MG/DL (ref 70–99)
GLUCOSE BLD-MCNC: 142 MG/DL (ref 70–99)
GLUCOSE BLD-MCNC: 145 MG/DL (ref 70–99)
GLUCOSE BLD-MCNC: 147 MG/DL (ref 70–99)
GLUCOSE BLD-MCNC: 150 MG/DL (ref 70–99)
GLUCOSE BLD-MCNC: 152 MG/DL (ref 70–99)
GLUCOSE BLD-MCNC: 154 MG/DL (ref 70–99)
GLUCOSE BLD-MCNC: 159 MG/DL (ref 70–99)
GLUCOSE BLD-MCNC: 162 MG/DL (ref 70–99)
GLUCOSE BLD-MCNC: 165 MG/DL (ref 70–99)
GLUCOSE BLD-MCNC: 166 MG/DL (ref 70–99)
GLUCOSE BLD-MCNC: 166 MG/DL (ref 70–99)
GLUCOSE BLD-MCNC: 172 MG/DL (ref 70–99)
GLUCOSE BLD-MCNC: 174 MG/DL (ref 70–99)
GLUCOSE BLD-MCNC: 175 MG/DL (ref 70–99)
GLUCOSE BLD-MCNC: 180 MG/DL (ref 70–99)
GLUCOSE BLD-MCNC: 181 MG/DL (ref 70–99)
GLUCOSE BLD-MCNC: 183 MG/DL (ref 70–99)
GLUCOSE BLD-MCNC: 184 MG/DL (ref 70–99)
GLUCOSE BLD-MCNC: 185 MG/DL (ref 70–99)
GLUCOSE BLD-MCNC: 186 MG/DL (ref 70–99)
GLUCOSE BLD-MCNC: 186 MG/DL (ref 70–99)
GLUCOSE BLD-MCNC: 188 MG/DL (ref 70–99)
GLUCOSE BLD-MCNC: 189 MG/DL (ref 70–99)
GLUCOSE BLD-MCNC: 190 MG/DL (ref 70–99)
GLUCOSE BLD-MCNC: 190 MG/DL (ref 70–99)
GLUCOSE BLD-MCNC: 192 MG/DL (ref 70–99)
GLUCOSE BLD-MCNC: 195 MG/DL (ref 70–99)
GLUCOSE BLD-MCNC: 196 MG/DL (ref 70–99)
GLUCOSE BLD-MCNC: 196 MG/DL (ref 70–99)
GLUCOSE BLD-MCNC: 197 MG/DL (ref 70–99)
GLUCOSE BLD-MCNC: 197 MG/DL (ref 70–99)
GLUCOSE BLD-MCNC: 201 MG/DL (ref 70–99)
GLUCOSE BLD-MCNC: 201 MG/DL (ref 70–99)
GLUCOSE BLD-MCNC: 202 MG/DL (ref 70–99)
GLUCOSE BLD-MCNC: 204 MG/DL (ref 70–99)
GLUCOSE BLD-MCNC: 204 MG/DL (ref 70–99)
GLUCOSE BLD-MCNC: 207 MG/DL (ref 70–99)
GLUCOSE BLD-MCNC: 210 MG/DL (ref 70–99)
GLUCOSE BLD-MCNC: 218 MG/DL (ref 70–99)
GLUCOSE BLD-MCNC: 220 MG/DL (ref 70–99)
GLUCOSE BLD-MCNC: 222 MG/DL (ref 70–99)
GLUCOSE BLD-MCNC: 224 MG/DL (ref 70–99)
GLUCOSE BLD-MCNC: 227 MG/DL (ref 70–99)
GLUCOSE BLD-MCNC: 230 MG/DL (ref 70–99)
GLUCOSE BLD-MCNC: 230 MG/DL (ref 70–99)
GLUCOSE BLD-MCNC: 231 MG/DL (ref 70–99)
GLUCOSE BLD-MCNC: 232 MG/DL (ref 70–99)
GLUCOSE BLD-MCNC: 235 MG/DL (ref 70–99)
GLUCOSE BLD-MCNC: 238 MG/DL (ref 70–99)
GLUCOSE BLD-MCNC: 241 MG/DL (ref 70–99)
GLUCOSE BLD-MCNC: 241 MG/DL (ref 70–99)
GLUCOSE BLD-MCNC: 243 MG/DL (ref 70–99)
GLUCOSE BLD-MCNC: 244 MG/DL (ref 70–99)
GLUCOSE BLD-MCNC: 247 MG/DL (ref 70–99)
GLUCOSE BLD-MCNC: 248 MG/DL (ref 70–99)
GLUCOSE BLD-MCNC: 249 MG/DL (ref 70–99)
GLUCOSE BLD-MCNC: 254 MG/DL (ref 70–99)
GLUCOSE BLD-MCNC: 255 MG/DL (ref 70–99)
GLUCOSE BLD-MCNC: 255 MG/DL (ref 70–99)
GLUCOSE BLD-MCNC: 256 MG/DL (ref 70–99)
GLUCOSE BLD-MCNC: 258 MG/DL (ref 70–99)
GLUCOSE BLD-MCNC: 259 MG/DL (ref 70–99)
GLUCOSE BLD-MCNC: 261 MG/DL (ref 70–99)
GLUCOSE BLD-MCNC: 262 MG/DL (ref 70–99)
GLUCOSE BLD-MCNC: 263 MG/DL (ref 70–99)
GLUCOSE BLD-MCNC: 263 MG/DL (ref 70–99)
GLUCOSE BLD-MCNC: 265 MG/DL (ref 70–99)
GLUCOSE BLD-MCNC: 266 MG/DL (ref 70–99)
GLUCOSE BLD-MCNC: 267 MG/DL (ref 70–99)
GLUCOSE BLD-MCNC: 268 MG/DL (ref 70–99)
GLUCOSE BLD-MCNC: 275 MG/DL (ref 70–99)
GLUCOSE BLD-MCNC: 276 MG/DL (ref 70–99)
GLUCOSE BLD-MCNC: 281 MG/DL (ref 70–99)
GLUCOSE BLD-MCNC: 283 MG/DL (ref 70–99)
GLUCOSE BLD-MCNC: 289 MG/DL (ref 70–99)
GLUCOSE BLD-MCNC: 296 MG/DL (ref 70–99)
GLUCOSE BLD-MCNC: 298 MG/DL (ref 70–99)
GLUCOSE BLD-MCNC: 300 MG/DL (ref 70–99)
GLUCOSE BLD-MCNC: 304 MG/DL (ref 70–99)
GLUCOSE BLD-MCNC: 307 MG/DL (ref 70–99)
GLUCOSE BLD-MCNC: 307 MG/DL (ref 70–99)
GLUCOSE BLD-MCNC: 310 MG/DL (ref 70–99)
GLUCOSE BLD-MCNC: 310 MG/DL (ref 70–99)
GLUCOSE BLD-MCNC: 313 MG/DL (ref 70–99)
GLUCOSE BLD-MCNC: 319 MG/DL (ref 70–99)
GLUCOSE BLD-MCNC: 331 MG/DL (ref 70–99)
GLUCOSE BLD-MCNC: 334 MG/DL (ref 70–99)
GLUCOSE BLD-MCNC: 336 MG/DL (ref 70–99)
GLUCOSE BLD-MCNC: 388 MG/DL (ref 70–99)
GLUCOSE UR STRIP.AUTO-MCNC: 50 MG/DL
GLUCOSE UR STRIP.AUTO-MCNC: 50 MG/DL
HAV IGM SER QL: 2.4 MG/DL (ref 1.6–2.6)
HCT VFR BLD AUTO: 24.4 % (ref 35–48)
HCT VFR BLD AUTO: 25 % (ref 35–48)
HCT VFR BLD AUTO: 25.1 % (ref 35–48)
HCT VFR BLD AUTO: 25.8 % (ref 35–48)
HCT VFR BLD AUTO: 26 % (ref 35–48)
HCT VFR BLD AUTO: 26 % (ref 35–48)
HCT VFR BLD AUTO: 26.3 % (ref 35–48)
HCT VFR BLD AUTO: 26.5 % (ref 35–48)
HCT VFR BLD AUTO: 26.6 % (ref 35–48)
HCT VFR BLD AUTO: 27 % (ref 35–48)
HCT VFR BLD AUTO: 28.5 % (ref 35–48)
HCT VFR BLD AUTO: 28.7 % (ref 35–48)
HCT VFR BLD AUTO: 28.8 % (ref 35–48)
HCT VFR BLD AUTO: 29.4 % (ref 35–48)
HCT VFR BLD AUTO: 29.5 % (ref 35–48)
HCT VFR BLD AUTO: 29.6 % (ref 35–48)
HCT VFR BLD AUTO: 29.7 % (ref 35–48)
HCT VFR BLD AUTO: 34.2 % (ref 35–48)
HCT VFR BLD AUTO: 37.9 % (ref 35–48)
HDLC SERPL-MCNC: 34 MG/DL (ref 40–59)
HGB BLD-MCNC: 11.6 G/DL (ref 12–16)
HGB BLD-MCNC: 12.8 G/DL (ref 12–16)
HGB BLD-MCNC: 7.8 G/DL (ref 12–16)
HGB BLD-MCNC: 8.1 G/DL (ref 12–16)
HGB BLD-MCNC: 8.1 G/DL (ref 12–16)
HGB BLD-MCNC: 8.3 G/DL (ref 12–16)
HGB BLD-MCNC: 8.4 G/DL (ref 12–16)
HGB BLD-MCNC: 8.6 G/DL (ref 12–16)
HGB BLD-MCNC: 8.7 G/DL (ref 12–16)
HGB BLD-MCNC: 9 G/DL (ref 12–16)
HGB BLD-MCNC: 9.2 G/DL (ref 12–16)
HGB BLD-MCNC: 9.5 G/DL (ref 12–16)
HGB BLD-MCNC: 9.5 G/DL (ref 12–16)
HGB BLD-MCNC: 9.6 G/DL (ref 12–16)
HYALINE CASTS #/AREA URNS AUTO: PRESENT /LPF
IMM GRANULOCYTES # BLD AUTO: 0.07 X10(3) UL (ref 0–1)
IMM GRANULOCYTES # BLD AUTO: 0.08 X10(3) UL (ref 0–1)
IMM GRANULOCYTES # BLD AUTO: 0.08 X10(3) UL (ref 0–1)
IMM GRANULOCYTES # BLD AUTO: 0.09 X10(3) UL (ref 0–1)
IMM GRANULOCYTES # BLD AUTO: 0.11 X10(3) UL (ref 0–1)
IMM GRANULOCYTES # BLD AUTO: 0.12 X10(3) UL (ref 0–1)
IMM GRANULOCYTES # BLD AUTO: 0.15 X10(3) UL (ref 0–1)
IMM GRANULOCYTES # BLD AUTO: 0.17 X10(3) UL (ref 0–1)
IMM GRANULOCYTES # BLD AUTO: 0.18 X10(3) UL (ref 0–1)
IMM GRANULOCYTES # BLD AUTO: 0.18 X10(3) UL (ref 0–1)
IMM GRANULOCYTES # BLD AUTO: 0.21 X10(3) UL (ref 0–1)
IMM GRANULOCYTES NFR BLD: 0.5 %
IMM GRANULOCYTES NFR BLD: 0.5 %
IMM GRANULOCYTES NFR BLD: 0.6 %
IMM GRANULOCYTES NFR BLD: 0.7 %
IMM GRANULOCYTES NFR BLD: 0.8 %
IMM GRANULOCYTES NFR BLD: 0.8 %
IMM GRANULOCYTES NFR BLD: 1 %
IMM GRANULOCYTES NFR BLD: 1.3 %
IMM GRANULOCYTES NFR BLD: 1.3 %
ISTAT ACTIVATED CLOTTING TIME: 175 SECONDS (ref 74–137)
ISTAT ACTIVATED CLOTTING TIME: 351 SECONDS (ref 74–137)
KETONES UR STRIP.AUTO-MCNC: 20 MG/DL
KETONES UR STRIP.AUTO-MCNC: NEGATIVE MG/DL
L/M: 2 L/MIN
LDLC SERPL CALC-MCNC: 152 MG/DL (ref ?–100)
LEUKOCYTE ESTERASE UR QL STRIP.AUTO: NEGATIVE
LEUKOCYTE ESTERASE UR QL STRIP.AUTO: NEGATIVE
LYMPHOCYTES # BLD AUTO: 0.61 X10(3) UL (ref 1–4)
LYMPHOCYTES # BLD AUTO: 1.06 X10(3) UL (ref 1–4)
LYMPHOCYTES # BLD AUTO: 1.36 X10(3) UL (ref 1–4)
LYMPHOCYTES # BLD AUTO: 1.89 X10(3) UL (ref 1–4)
LYMPHOCYTES # BLD AUTO: 1.96 X10(3) UL (ref 1–4)
LYMPHOCYTES # BLD AUTO: 2.08 X10(3) UL (ref 1–4)
LYMPHOCYTES # BLD AUTO: 2.22 X10(3) UL (ref 1–4)
LYMPHOCYTES # BLD AUTO: 2.27 X10(3) UL (ref 1–4)
LYMPHOCYTES # BLD AUTO: 2.32 X10(3) UL (ref 1–4)
LYMPHOCYTES # BLD AUTO: 2.42 X10(3) UL (ref 1–4)
LYMPHOCYTES # BLD AUTO: 2.61 X10(3) UL (ref 1–4)
LYMPHOCYTES # BLD AUTO: 2.78 X10(3) UL (ref 1–4)
LYMPHOCYTES # BLD AUTO: 3.36 X10(3) UL (ref 1–4)
LYMPHOCYTES NFR BLD AUTO: 13.3 %
LYMPHOCYTES NFR BLD AUTO: 15.4 %
LYMPHOCYTES NFR BLD AUTO: 16.1 %
LYMPHOCYTES NFR BLD AUTO: 16.9 %
LYMPHOCYTES NFR BLD AUTO: 17.5 %
LYMPHOCYTES NFR BLD AUTO: 18.7 %
LYMPHOCYTES NFR BLD AUTO: 21.3 %
LYMPHOCYTES NFR BLD AUTO: 22.8 %
LYMPHOCYTES NFR BLD AUTO: 3.6 %
LYMPHOCYTES NFR BLD AUTO: 4.5 %
LYMPHOCYTES NFR BLD AUTO: 6.8 %
LYMPHOCYTES NFR BLD AUTO: 7.7 %
LYMPHOCYTES NFR BLD AUTO: 9.9 %
M PROTEIN MFR SERPL ELPH: 6.2 G/DL (ref 6.4–8.2)
M PROTEIN MFR SERPL ELPH: 6.6 G/DL (ref 6.4–8.2)
M PROTEIN MFR SERPL ELPH: 7 G/DL (ref 6.4–8.2)
MCH RBC QN AUTO: 29.4 PG (ref 26–34)
MCH RBC QN AUTO: 29.5 PG (ref 26–34)
MCH RBC QN AUTO: 29.6 PG (ref 26–34)
MCH RBC QN AUTO: 29.7 PG (ref 26–34)
MCH RBC QN AUTO: 29.8 PG (ref 26–34)
MCH RBC QN AUTO: 29.8 PG (ref 26–34)
MCH RBC QN AUTO: 29.9 PG (ref 26–34)
MCH RBC QN AUTO: 30.1 PG (ref 26–34)
MCH RBC QN AUTO: 30.2 PG (ref 26–34)
MCH RBC QN AUTO: 30.3 PG (ref 26–34)
MCH RBC QN AUTO: 30.4 PG (ref 26–34)
MCHC RBC AUTO-ENTMCNC: 31.6 G/DL (ref 31–37)
MCHC RBC AUTO-ENTMCNC: 31.9 G/DL (ref 31–37)
MCHC RBC AUTO-ENTMCNC: 32 G/DL (ref 31–37)
MCHC RBC AUTO-ENTMCNC: 32 G/DL (ref 31–37)
MCHC RBC AUTO-ENTMCNC: 32.1 G/DL (ref 31–37)
MCHC RBC AUTO-ENTMCNC: 32.3 G/DL (ref 31–37)
MCHC RBC AUTO-ENTMCNC: 32.3 G/DL (ref 31–37)
MCHC RBC AUTO-ENTMCNC: 32.4 G/DL (ref 31–37)
MCHC RBC AUTO-ENTMCNC: 32.4 G/DL (ref 31–37)
MCHC RBC AUTO-ENTMCNC: 32.5 G/DL (ref 31–37)
MCHC RBC AUTO-ENTMCNC: 32.6 G/DL (ref 31–37)
MCHC RBC AUTO-ENTMCNC: 32.7 G/DL (ref 31–37)
MCHC RBC AUTO-ENTMCNC: 32.7 G/DL (ref 31–37)
MCHC RBC AUTO-ENTMCNC: 32.8 G/DL (ref 31–37)
MCHC RBC AUTO-ENTMCNC: 33.1 G/DL (ref 31–37)
MCHC RBC AUTO-ENTMCNC: 33.3 G/DL (ref 31–37)
MCHC RBC AUTO-ENTMCNC: 33.5 G/DL (ref 31–37)
MCHC RBC AUTO-ENTMCNC: 33.8 G/DL (ref 31–37)
MCHC RBC AUTO-ENTMCNC: 33.9 G/DL (ref 31–37)
MCV RBC AUTO: 88.6 FL (ref 80–100)
MCV RBC AUTO: 89.8 FL (ref 80–100)
MCV RBC AUTO: 90.3 FL (ref 80–100)
MCV RBC AUTO: 90.5 FL (ref 80–100)
MCV RBC AUTO: 90.8 FL (ref 80–100)
MCV RBC AUTO: 90.9 FL (ref 80–100)
MCV RBC AUTO: 91.1 FL (ref 80–100)
MCV RBC AUTO: 91.2 FL (ref 80–100)
MCV RBC AUTO: 91.5 FL (ref 80–100)
MCV RBC AUTO: 91.6 FL (ref 80–100)
MCV RBC AUTO: 91.6 FL (ref 80–100)
MCV RBC AUTO: 91.9 FL (ref 80–100)
MCV RBC AUTO: 92 FL (ref 80–100)
MCV RBC AUTO: 92.4 FL (ref 80–100)
MCV RBC AUTO: 92.5 FL (ref 80–100)
MCV RBC AUTO: 92.5 FL (ref 80–100)
MCV RBC AUTO: 92.8 FL (ref 80–100)
MCV RBC AUTO: 92.9 FL (ref 80–100)
MCV RBC AUTO: 93.3 FL (ref 80–100)
METHEMOGLOBIN: 0.6 % SAT (ref 0.4–1.5)
MONOCYTES # BLD AUTO: 0.5 X10(3) UL (ref 0.1–1)
MONOCYTES # BLD AUTO: 0.86 X10(3) UL (ref 0.1–1)
MONOCYTES # BLD AUTO: 0.95 X10(3) UL (ref 0.1–1)
MONOCYTES # BLD AUTO: 0.97 X10(3) UL (ref 0.1–1)
MONOCYTES # BLD AUTO: 1.04 X10(3) UL (ref 0.1–1)
MONOCYTES # BLD AUTO: 1.08 X10(3) UL (ref 0.1–1)
MONOCYTES # BLD AUTO: 1.08 X10(3) UL (ref 0.1–1)
MONOCYTES # BLD AUTO: 1.12 X10(3) UL (ref 0.1–1)
MONOCYTES # BLD AUTO: 1.22 X10(3) UL (ref 0.1–1)
MONOCYTES # BLD AUTO: 1.44 X10(3) UL (ref 0.1–1)
MONOCYTES # BLD AUTO: 1.6 X10(3) UL (ref 0.1–1)
MONOCYTES # BLD AUTO: 2.2 X10(3) UL (ref 0.1–1)
MONOCYTES # BLD AUTO: 2.67 X10(3) UL (ref 0.1–1)
MONOCYTES NFR BLD AUTO: 10.9 %
MONOCYTES NFR BLD AUTO: 2.9 %
MONOCYTES NFR BLD AUTO: 3.7 %
MONOCYTES NFR BLD AUTO: 6.6 %
MONOCYTES NFR BLD AUTO: 7.2 %
MONOCYTES NFR BLD AUTO: 7.3 %
MONOCYTES NFR BLD AUTO: 8 %
MONOCYTES NFR BLD AUTO: 8.3 %
MONOCYTES NFR BLD AUTO: 8.5 %
MONOCYTES NFR BLD AUTO: 9.8 %
MONOCYTES NFR BLD AUTO: 9.9 %
NEUTROPHILS # BLD AUTO: 10.24 X10 (3) UL (ref 1.5–7.7)
NEUTROPHILS # BLD AUTO: 10.24 X10(3) UL (ref 1.5–7.7)
NEUTROPHILS # BLD AUTO: 10.7 X10 (3) UL (ref 1.5–7.7)
NEUTROPHILS # BLD AUTO: 10.7 X10(3) UL (ref 1.5–7.7)
NEUTROPHILS # BLD AUTO: 10.82 X10 (3) UL (ref 1.5–7.7)
NEUTROPHILS # BLD AUTO: 10.82 X10(3) UL (ref 1.5–7.7)
NEUTROPHILS # BLD AUTO: 15.72 X10 (3) UL (ref 1.5–7.7)
NEUTROPHILS # BLD AUTO: 15.72 X10(3) UL (ref 1.5–7.7)
NEUTROPHILS # BLD AUTO: 16.34 X10 (3) UL (ref 1.5–7.7)
NEUTROPHILS # BLD AUTO: 16.34 X10(3) UL (ref 1.5–7.7)
NEUTROPHILS # BLD AUTO: 17.55 X10 (3) UL (ref 1.5–7.7)
NEUTROPHILS # BLD AUTO: 17.55 X10(3) UL (ref 1.5–7.7)
NEUTROPHILS # BLD AUTO: 19.63 X10 (3) UL (ref 1.5–7.7)
NEUTROPHILS # BLD AUTO: 19.63 X10(3) UL (ref 1.5–7.7)
NEUTROPHILS # BLD AUTO: 21.35 X10 (3) UL (ref 1.5–7.7)
NEUTROPHILS # BLD AUTO: 21.35 X10(3) UL (ref 1.5–7.7)
NEUTROPHILS # BLD AUTO: 8.85 X10 (3) UL (ref 1.5–7.7)
NEUTROPHILS # BLD AUTO: 8.85 X10(3) UL (ref 1.5–7.7)
NEUTROPHILS # BLD AUTO: 9.31 X10 (3) UL (ref 1.5–7.7)
NEUTROPHILS # BLD AUTO: 9.31 X10(3) UL (ref 1.5–7.7)
NEUTROPHILS # BLD AUTO: 9.82 X10 (3) UL (ref 1.5–7.7)
NEUTROPHILS # BLD AUTO: 9.82 X10(3) UL (ref 1.5–7.7)
NEUTROPHILS # BLD AUTO: 9.85 X10 (3) UL (ref 1.5–7.7)
NEUTROPHILS # BLD AUTO: 9.85 X10(3) UL (ref 1.5–7.7)
NEUTROPHILS # BLD AUTO: 9.93 X10 (3) UL (ref 1.5–7.7)
NEUTROPHILS # BLD AUTO: 9.93 X10(3) UL (ref 1.5–7.7)
NEUTROPHILS NFR BLD AUTO: 67 %
NEUTROPHILS NFR BLD AUTO: 67.6 %
NEUTROPHILS NFR BLD AUTO: 70.7 %
NEUTROPHILS NFR BLD AUTO: 71.6 %
NEUTROPHILS NFR BLD AUTO: 71.7 %
NEUTROPHILS NFR BLD AUTO: 72.5 %
NEUTROPHILS NFR BLD AUTO: 73.7 %
NEUTROPHILS NFR BLD AUTO: 74.8 %
NEUTROPHILS NFR BLD AUTO: 78.6 %
NEUTROPHILS NFR BLD AUTO: 80.3 %
NEUTROPHILS NFR BLD AUTO: 81.7 %
NEUTROPHILS NFR BLD AUTO: 90.7 %
NEUTROPHILS NFR BLD AUTO: 92.4 %
NITRITE UR QL STRIP.AUTO: NEGATIVE
NITRITE UR QL STRIP.AUTO: NEGATIVE
NON GYNE INTERPRETATION: NEGATIVE
NONHDLC SERPL-MCNC: 174 MG/DL (ref ?–130)
OSMOLALITY SERPL CALC.SUM OF ELEC: 287 MOSM/KG (ref 275–295)
OSMOLALITY SERPL CALC.SUM OF ELEC: 291 MOSM/KG (ref 275–295)
OSMOLALITY SERPL CALC.SUM OF ELEC: 294 MOSM/KG (ref 275–295)
OSMOLALITY SERPL CALC.SUM OF ELEC: 295 MOSM/KG (ref 275–295)
OSMOLALITY SERPL CALC.SUM OF ELEC: 295 MOSM/KG (ref 275–295)
OSMOLALITY SERPL CALC.SUM OF ELEC: 296 MOSM/KG (ref 275–295)
OSMOLALITY SERPL CALC.SUM OF ELEC: 296 MOSM/KG (ref 275–295)
OSMOLALITY SERPL CALC.SUM OF ELEC: 297 MOSM/KG (ref 275–295)
OSMOLALITY SERPL CALC.SUM OF ELEC: 303 MOSM/KG (ref 275–295)
OSMOLALITY SERPL CALC.SUM OF ELEC: 304 MOSM/KG (ref 275–295)
OSMOLALITY SERPL CALC.SUM OF ELEC: 306 MOSM/KG (ref 275–295)
OSMOLALITY SERPL CALC.SUM OF ELEC: 307 MOSM/KG (ref 275–295)
OSMOLALITY SERPL CALC.SUM OF ELEC: 312 MOSM/KG (ref 275–295)
OSMOLALITY SERPL CALC.SUM OF ELEC: 315 MOSM/KG (ref 275–295)
OSMOLALITY SERPL CALC.SUM OF ELEC: 316 MOSM/KG (ref 275–295)
OSMOLALITY SERPL CALC.SUM OF ELEC: 317 MOSM/KG (ref 275–295)
OSMOLALITY SERPL CALC.SUM OF ELEC: 320 MOSM/KG (ref 275–295)
OSMOLALITY SERPL CALC.SUM OF ELEC: 323 MOSM/KG (ref 275–295)
P AXIS: 70 DEGREES
P AXIS: 70 DEGREES
P AXIS: 71 DEGREES
P AXIS: 75 DEGREES
P-R INTERVAL: 176 MS
P-R INTERVAL: 182 MS
P-R INTERVAL: 208 MS
P-R INTERVAL: 212 MS
P/F RATIO: 387 MMHG
PATIENT TEMPERATURE: 98.6 F
PH UR STRIP.AUTO: 5 [PH] (ref 4.5–8)
PH UR STRIP.AUTO: 6 [PH] (ref 4.5–8)
PLATELET # BLD AUTO: 232 10(3)UL (ref 150–450)
PLATELET # BLD AUTO: 241 10(3)UL (ref 150–450)
PLATELET # BLD AUTO: 243 10(3)UL (ref 150–450)
PLATELET # BLD AUTO: 250 10(3)UL (ref 150–450)
PLATELET # BLD AUTO: 254 10(3)UL (ref 150–450)
PLATELET # BLD AUTO: 255 10(3)UL (ref 150–450)
PLATELET # BLD AUTO: 265 10(3)UL (ref 150–450)
PLATELET # BLD AUTO: 269 10(3)UL (ref 150–450)
PLATELET # BLD AUTO: 281 10(3)UL (ref 150–450)
PLATELET # BLD AUTO: 288 10(3)UL (ref 150–450)
PLATELET # BLD AUTO: 301 10(3)UL (ref 150–450)
PLATELET # BLD AUTO: 307 10(3)UL (ref 150–450)
PLATELET # BLD AUTO: 339 10(3)UL (ref 150–450)
PLATELET # BLD AUTO: 343 10(3)UL (ref 150–450)
PLATELET # BLD AUTO: 350 10(3)UL (ref 150–450)
PLATELET # BLD AUTO: 355 10(3)UL (ref 150–450)
PLATELET # BLD AUTO: 377 10(3)UL (ref 150–450)
PLATELET # BLD AUTO: 383 10(3)UL (ref 150–450)
PLATELET # BLD AUTO: 397 10(3)UL (ref 150–450)
PLATELET # BLD AUTO: 430 10(3)UL (ref 150–450)
POTASSIUM SERPL-SCNC: 3.4 MMOL/L (ref 3.5–5.1)
POTASSIUM SERPL-SCNC: 3.4 MMOL/L (ref 3.5–5.1)
POTASSIUM SERPL-SCNC: 3.5 MMOL/L (ref 3.5–5.1)
POTASSIUM SERPL-SCNC: 3.5 MMOL/L (ref 3.5–5.1)
POTASSIUM SERPL-SCNC: 3.6 MMOL/L (ref 3.5–5.1)
POTASSIUM SERPL-SCNC: 3.7 MMOL/L (ref 3.5–5.1)
POTASSIUM SERPL-SCNC: 3.7 MMOL/L (ref 3.5–5.1)
POTASSIUM SERPL-SCNC: 3.8 MMOL/L (ref 3.5–5.1)
POTASSIUM SERPL-SCNC: 3.9 MMOL/L (ref 3.5–5.1)
POTASSIUM SERPL-SCNC: 4.1 MMOL/L (ref 3.5–5.1)
POTASSIUM SERPL-SCNC: 4.1 MMOL/L (ref 3.5–5.1)
POTASSIUM SERPL-SCNC: 4.2 MMOL/L (ref 3.5–5.1)
POTASSIUM SERPL-SCNC: 4.4 MMOL/L (ref 3.5–5.1)
POTASSIUM SERPL-SCNC: 4.4 MMOL/L (ref 3.5–5.1)
POTASSIUM SERPL-SCNC: 4.5 MMOL/L (ref 3.5–5.1)
POTASSIUM SERPL-SCNC: 4.5 MMOL/L (ref 3.5–5.1)
POTASSIUM SERPL-SCNC: 4.8 MMOL/L (ref 3.5–5.1)
POTASSIUM SERPL-SCNC: 4.8 MMOL/L (ref 3.5–5.1)
POTASSIUM SERPL-SCNC: 5.2 MMOL/L (ref 3.5–5.1)
PROCALCITONIN SERPL-MCNC: 0.94 NG/ML
PROT UR STRIP.AUTO-MCNC: 30 MG/DL
PROT UR STRIP.AUTO-MCNC: NEGATIVE MG/DL
Q-T INTERVAL: 324 MS
Q-T INTERVAL: 334 MS
Q-T INTERVAL: 366 MS
Q-T INTERVAL: 392 MS
QRS DURATION: 100 MS
QRS DURATION: 106 MS
QRS DURATION: 108 MS
QRS DURATION: 110 MS
QTC CALCULATION (BEZET): 444 MS
QTC CALCULATION (BEZET): 445 MS
QTC CALCULATION (BEZET): 479 MS
QTC CALCULATION (BEZET): 500 MS
R AXIS: -17 DEGREES
R AXIS: -2 DEGREES
R AXIS: 33 DEGREES
R AXIS: 64 DEGREES
RBC # BLD AUTO: 2.64 X10(6)UL (ref 3.8–5.3)
RBC # BLD AUTO: 2.72 X10(6)UL (ref 3.8–5.3)
RBC # BLD AUTO: 2.74 X10(6)UL (ref 3.8–5.3)
RBC # BLD AUTO: 2.82 X10(6)UL (ref 3.8–5.3)
RBC # BLD AUTO: 2.82 X10(6)UL (ref 3.8–5.3)
RBC # BLD AUTO: 2.85 X10(6)UL (ref 3.8–5.3)
RBC # BLD AUTO: 2.86 X10(6)UL (ref 3.8–5.3)
RBC # BLD AUTO: 2.91 X10(6)UL (ref 3.8–5.3)
RBC # BLD AUTO: 2.94 X10(6)UL (ref 3.8–5.3)
RBC # BLD AUTO: 2.99 X10(6)UL (ref 3.8–5.3)
RBC # BLD AUTO: 3.08 X10(6)UL (ref 3.8–5.3)
RBC # BLD AUTO: 3.1 X10(6)UL (ref 3.8–5.3)
RBC # BLD AUTO: 3.12 X10(6)UL (ref 3.8–5.3)
RBC # BLD AUTO: 3.19 X10(6)UL (ref 3.8–5.3)
RBC # BLD AUTO: 3.21 X10(6)UL (ref 3.8–5.3)
RBC # BLD AUTO: 3.21 X10(6)UL (ref 3.8–5.3)
RBC # BLD AUTO: 3.25 X10(6)UL (ref 3.8–5.3)
RBC # BLD AUTO: 3.86 X10(6)UL (ref 3.8–5.3)
RBC # BLD AUTO: 4.22 X10(6)UL (ref 3.8–5.3)
RBC #/AREA URNS AUTO: >10 /HPF
SODIUM SERPL-SCNC: 135 MMOL/L (ref 136–145)
SODIUM SERPL-SCNC: 135 MMOL/L (ref 136–145)
SODIUM SERPL-SCNC: 137 MMOL/L (ref 136–145)
SODIUM SERPL-SCNC: 137 MMOL/L (ref 136–145)
SODIUM SERPL-SCNC: 138 MMOL/L (ref 136–145)
SODIUM SERPL-SCNC: 138 MMOL/L (ref 136–145)
SODIUM SERPL-SCNC: 139 MMOL/L (ref 136–145)
SODIUM SERPL-SCNC: 139 MMOL/L (ref 136–145)
SODIUM SERPL-SCNC: 140 MMOL/L (ref 136–145)
SODIUM SERPL-SCNC: 141 MMOL/L (ref 136–145)
SODIUM SERPL-SCNC: 143 MMOL/L (ref 136–145)
SODIUM SERPL-SCNC: 143 MMOL/L (ref 136–145)
SODIUM SERPL-SCNC: 145 MMOL/L (ref 136–145)
SODIUM SERPL-SCNC: 146 MMOL/L (ref 136–145)
SODIUM SERPL-SCNC: 147 MMOL/L (ref 136–145)
SODIUM SERPL-SCNC: 148 MMOL/L (ref 136–145)
SP GR UR STRIP.AUTO: 1.02 (ref 1–1.03)
SP GR UR STRIP.AUTO: 1.02 (ref 1–1.03)
T AXIS: 107 DEGREES
T AXIS: 113 DEGREES
T AXIS: 137 DEGREES
T AXIS: 97 DEGREES
TOTAL HEMOGLOBIN: 12.9 G/DL (ref 11.7–16)
TRIGL SERPL-MCNC: 109 MG/DL (ref 30–149)
TROPONIN I SERPL-MCNC: 1.1 NG/ML (ref ?–0.04)
TROPONIN I SERPL-MCNC: >200 NG/ML (ref ?–0.04)
UROBILINOGEN UR STRIP.AUTO-MCNC: <2 MG/DL
UROBILINOGEN UR STRIP.AUTO-MCNC: <2 MG/DL
VENTRICULAR RATE: 103 BPM
VENTRICULAR RATE: 107 BPM
VENTRICULAR RATE: 113 BPM
VENTRICULAR RATE: 98 BPM
VLDLC SERPL CALC-MCNC: 22 MG/DL (ref 0–30)
WBC # BLD AUTO: 12.1 X10(3) UL (ref 4–11)
WBC # BLD AUTO: 13 X10(3) UL (ref 4–11)
WBC # BLD AUTO: 13.1 X10(3) UL (ref 4–11)
WBC # BLD AUTO: 13.5 X10(3) UL (ref 4–11)
WBC # BLD AUTO: 13.9 X10(3) UL (ref 4–11)
WBC # BLD AUTO: 14 X10(3) UL (ref 4–11)
WBC # BLD AUTO: 14 X10(3) UL (ref 4–11)
WBC # BLD AUTO: 14.3 X10(3) UL (ref 4–11)
WBC # BLD AUTO: 14.5 X10(3) UL (ref 4–11)
WBC # BLD AUTO: 14.7 X10(3) UL (ref 4–11)
WBC # BLD AUTO: 14.8 X10(3) UL (ref 4–11)
WBC # BLD AUTO: 15.7 X10(3) UL (ref 4–11)
WBC # BLD AUTO: 16.5 X10(3) UL (ref 4–11)
WBC # BLD AUTO: 17 X10(3) UL (ref 4–11)
WBC # BLD AUTO: 18.6 X10(3) UL (ref 4–11)
WBC # BLD AUTO: 20 X10(3) UL (ref 4–11)
WBC # BLD AUTO: 22.3 X10(3) UL (ref 4–11)
WBC # BLD AUTO: 23.5 X10(3) UL (ref 4–11)
WBC # BLD AUTO: 24.5 X10(3) UL (ref 4–11)
WBC # BLD AUTO: 26.8 X10(3) UL (ref 4–11)

## 2020-01-01 PROCEDURE — 99356 PROLONGED SERV,INPATIENT,1ST HR: CPT | Performed by: NURSE PRACTITIONER

## 2020-01-01 PROCEDURE — 99232 SBSQ HOSP IP/OBS MODERATE 35: CPT | Performed by: INTERNAL MEDICINE

## 2020-01-01 PROCEDURE — 99232 SBSQ HOSP IP/OBS MODERATE 35: CPT | Performed by: OTHER

## 2020-01-01 PROCEDURE — 99233 SBSQ HOSP IP/OBS HIGH 50: CPT | Performed by: INTERNAL MEDICINE

## 2020-01-01 PROCEDURE — 8E0W4CZ ROBOTIC ASSISTED PROCEDURE OF TRUNK REGION, PERCUTANEOUS ENDOSCOPIC APPROACH: ICD-10-PCS | Performed by: OBSTETRICS & GYNECOLOGY

## 2020-01-01 PROCEDURE — 75989 ABSCESS DRAINAGE UNDER X-RAY: CPT | Performed by: HOSPITALIST

## 2020-01-01 PROCEDURE — 49406 IMAGE CATH FLUID PERI/RETRO: CPT | Performed by: HOSPITALIST

## 2020-01-01 PROCEDURE — 71045 X-RAY EXAM CHEST 1 VIEW: CPT | Performed by: INTERNAL MEDICINE

## 2020-01-01 PROCEDURE — B211YZZ FLUOROSCOPY OF MULTIPLE CORONARY ARTERIES USING OTHER CONTRAST: ICD-10-PCS | Performed by: INTERNAL MEDICINE

## 2020-01-01 PROCEDURE — 93306 TTE W/DOPPLER COMPLETE: CPT | Performed by: INTERNAL MEDICINE

## 2020-01-01 PROCEDURE — 99222 1ST HOSP IP/OBS MODERATE 55: CPT | Performed by: INTERNAL MEDICINE

## 2020-01-01 PROCEDURE — 99232 SBSQ HOSP IP/OBS MODERATE 35: CPT | Performed by: NURSE PRACTITIONER

## 2020-01-01 PROCEDURE — 71045 X-RAY EXAM CHEST 1 VIEW: CPT | Performed by: NURSE PRACTITIONER

## 2020-01-01 PROCEDURE — 99292 CRITICAL CARE ADDL 30 MIN: CPT | Performed by: INTERNAL MEDICINE

## 2020-01-01 PROCEDURE — 99222 1ST HOSP IP/OBS MODERATE 55: CPT | Performed by: NURSE PRACTITIONER

## 2020-01-01 PROCEDURE — 99291 CRITICAL CARE FIRST HOUR: CPT | Performed by: INTERNAL MEDICINE

## 2020-01-01 PROCEDURE — 99152 MOD SED SAME PHYS/QHP 5/>YRS: CPT | Performed by: INTERNAL MEDICINE

## 2020-01-01 PROCEDURE — 78227 HEPATOBIL SYST IMAGE W/DRUG: CPT | Performed by: HOSPITALIST

## 2020-01-01 PROCEDURE — 027034Z DILATION OF CORONARY ARTERY, ONE ARTERY WITH DRUG-ELUTING INTRALUMINAL DEVICE, PERCUTANEOUS APPROACH: ICD-10-PCS | Performed by: INTERNAL MEDICINE

## 2020-01-01 PROCEDURE — 74176 CT ABD & PELVIS W/O CONTRAST: CPT | Performed by: INTERNAL MEDICINE

## 2020-01-01 PROCEDURE — 02HV33Z INSERTION OF INFUSION DEVICE INTO SUPERIOR VENA CAVA, PERCUTANEOUS APPROACH: ICD-10-PCS | Performed by: INTERNAL MEDICINE

## 2020-01-01 PROCEDURE — 70496 CT ANGIOGRAPHY HEAD: CPT | Performed by: INTERNAL MEDICINE

## 2020-01-01 PROCEDURE — 5A02210 ASSISTANCE WITH CARDIAC OUTPUT USING BALLOON PUMP, CONTINUOUS: ICD-10-PCS | Performed by: INTERNAL MEDICINE

## 2020-01-01 PROCEDURE — 93970 EXTREMITY STUDY: CPT | Performed by: INTERNAL MEDICINE

## 2020-01-01 PROCEDURE — 0UT24ZZ RESECTION OF BILATERAL OVARIES, PERCUTANEOUS ENDOSCOPIC APPROACH: ICD-10-PCS | Performed by: OBSTETRICS & GYNECOLOGY

## 2020-01-01 PROCEDURE — 0UT94ZZ RESECTION OF UTERUS, PERCUTANEOUS ENDOSCOPIC APPROACH: ICD-10-PCS | Performed by: OBSTETRICS & GYNECOLOGY

## 2020-01-01 PROCEDURE — 93971 EXTREMITY STUDY: CPT | Performed by: INTERNAL MEDICINE

## 2020-01-01 PROCEDURE — 71045 X-RAY EXAM CHEST 1 VIEW: CPT | Performed by: HOSPITALIST

## 2020-01-01 PROCEDURE — 78452 HT MUSCLE IMAGE SPECT MULT: CPT | Performed by: NURSE PRACTITIONER

## 2020-01-01 PROCEDURE — 33967 INSERT I-AORT PERCUT DEVICE: CPT | Performed by: INTERNAL MEDICINE

## 2020-01-01 PROCEDURE — 99221 1ST HOSP IP/OBS SF/LOW 40: CPT | Performed by: NURSE PRACTITIONER

## 2020-01-01 PROCEDURE — 30233N1 TRANSFUSION OF NONAUTOLOGOUS RED BLOOD CELLS INTO PERIPHERAL VEIN, PERCUTANEOUS APPROACH: ICD-10-PCS | Performed by: INTERNAL MEDICINE

## 2020-01-01 PROCEDURE — 99291 CRITICAL CARE FIRST HOUR: CPT | Performed by: OTHER

## 2020-01-01 PROCEDURE — 70450 CT HEAD/BRAIN W/O DYE: CPT | Performed by: INTERNAL MEDICINE

## 2020-01-01 PROCEDURE — 99233 SBSQ HOSP IP/OBS HIGH 50: CPT | Performed by: NURSE PRACTITIONER

## 2020-01-01 PROCEDURE — 70551 MRI BRAIN STEM W/O DYE: CPT | Performed by: NURSE PRACTITIONER

## 2020-01-01 PROCEDURE — 70551 MRI BRAIN STEM W/O DYE: CPT | Performed by: OTHER

## 2020-01-01 PROCEDURE — B5181ZA FLUOROSCOPY OF SUPERIOR VENA CAVA USING LOW OSMOLAR CONTRAST, GUIDANCE: ICD-10-PCS | Performed by: INTERNAL MEDICINE

## 2020-01-01 PROCEDURE — 20553 NJX 1/MLT TRIGGER POINTS 3/>: CPT | Performed by: OTHER

## 2020-01-01 PROCEDURE — 99214 OFFICE O/P EST MOD 30 MIN: CPT | Performed by: NURSE PRACTITIONER

## 2020-01-01 PROCEDURE — 92941 PRQ TRLML REVSC TOT OCCL AMI: CPT | Performed by: INTERNAL MEDICINE

## 2020-01-01 PROCEDURE — 0UQG0ZZ REPAIR VAGINA, OPEN APPROACH: ICD-10-PCS | Performed by: OBSTETRICS & GYNECOLOGY

## 2020-01-01 PROCEDURE — 90792 PSYCH DIAG EVAL W/MED SRVCS: CPT | Performed by: OTHER

## 2020-01-01 PROCEDURE — 05HY33Z INSERTION OF INFUSION DEVICE INTO UPPER VEIN, PERCUTANEOUS APPROACH: ICD-10-PCS | Performed by: HOSPITALIST

## 2020-01-01 PROCEDURE — 70498 CT ANGIOGRAPHY NECK: CPT | Performed by: INTERNAL MEDICINE

## 2020-01-01 PROCEDURE — 0UCG7ZZ EXTIRPATION OF MATTER FROM VAGINA, VIA NATURAL OR ARTIFICIAL OPENING: ICD-10-PCS | Performed by: OBSTETRICS & GYNECOLOGY

## 2020-01-01 PROCEDURE — 4A023N7 MEASUREMENT OF CARDIAC SAMPLING AND PRESSURE, LEFT HEART, PERCUTANEOUS APPROACH: ICD-10-PCS | Performed by: INTERNAL MEDICINE

## 2020-01-01 PROCEDURE — 74176 CT ABD & PELVIS W/O CONTRAST: CPT | Performed by: HOSPITALIST

## 2020-01-01 PROCEDURE — 93454 CORONARY ARTERY ANGIO S&I: CPT | Performed by: INTERNAL MEDICINE

## 2020-01-01 PROCEDURE — 47490 INCISION OF GALLBLADDER: CPT | Performed by: HOSPITALIST

## 2020-01-01 PROCEDURE — 93018 CV STRESS TEST I&R ONLY: CPT | Performed by: NURSE PRACTITIONER

## 2020-01-01 PROCEDURE — 93017 CV STRESS TEST TRACING ONLY: CPT | Performed by: NURSE PRACTITIONER

## 2020-01-01 PROCEDURE — 75635 CT ANGIO ABDOMINAL ARTERIES: CPT | Performed by: SURGERY

## 2020-01-01 PROCEDURE — 02703ZZ DILATION OF CORONARY ARTERY, ONE ARTERY, PERCUTANEOUS APPROACH: ICD-10-PCS | Performed by: INTERNAL MEDICINE

## 2020-01-01 PROCEDURE — 0F943ZZ DRAINAGE OF GALLBLADDER, PERCUTANEOUS APPROACH: ICD-10-PCS | Performed by: RADIOLOGY

## 2020-01-01 PROCEDURE — 0UT74ZZ RESECTION OF BILATERAL FALLOPIAN TUBES, PERCUTANEOUS ENDOSCOPIC APPROACH: ICD-10-PCS | Performed by: OBSTETRICS & GYNECOLOGY

## 2020-01-01 PROCEDURE — 71250 CT THORAX DX C-: CPT | Performed by: INTERNAL MEDICINE

## 2020-01-01 RX ORDER — VANCOMYCIN/0.9 % SOD CHLORIDE 1.75 G/5
25 PLASTIC BAG, INJECTION (ML) INTRAVENOUS ONCE
Status: COMPLETED | OUTPATIENT
Start: 2020-01-01 | End: 2020-01-01

## 2020-01-01 RX ORDER — LORAZEPAM 2 MG/ML
0.25 INJECTION INTRAMUSCULAR ONCE
Status: COMPLETED | OUTPATIENT
Start: 2020-01-01 | End: 2020-01-01

## 2020-01-01 RX ORDER — LIDOCAINE HYDROCHLORIDE 10 MG/ML
INJECTION, SOLUTION EPIDURAL; INFILTRATION; INTRACAUDAL; PERINEURAL
Status: COMPLETED
Start: 2020-01-01 | End: 2020-01-01

## 2020-01-01 RX ORDER — ONDANSETRON 2 MG/ML
4 INJECTION INTRAMUSCULAR; INTRAVENOUS EVERY 6 HOURS PRN
Status: DISCONTINUED | OUTPATIENT
Start: 2020-01-01 | End: 2020-01-01

## 2020-01-01 RX ORDER — HYDROCODONE BITARTRATE AND ACETAMINOPHEN 5; 325 MG/1; MG/1
2 TABLET ORAL EVERY 4 HOURS PRN
Status: DISCONTINUED | OUTPATIENT
Start: 2020-01-01 | End: 2020-01-01

## 2020-01-01 RX ORDER — DEXTROSE MONOHYDRATE 25 G/50ML
50 INJECTION, SOLUTION INTRAVENOUS
Status: DISCONTINUED | OUTPATIENT
Start: 2020-01-01 | End: 2020-01-01

## 2020-01-01 RX ORDER — PREDNISONE 10 MG/1
10 TABLET ORAL DAILY
COMMUNITY
End: 2020-01-01

## 2020-01-01 RX ORDER — HYDROCODONE BITARTRATE AND ACETAMINOPHEN 5; 325 MG/1; MG/1
1 TABLET ORAL EVERY 4 HOURS PRN
Status: DISCONTINUED | OUTPATIENT
Start: 2020-01-01 | End: 2020-01-01

## 2020-01-01 RX ORDER — MORPHINE SULFATE 4 MG/ML
1 INJECTION, SOLUTION INTRAMUSCULAR; INTRAVENOUS EVERY 4 HOURS PRN
Status: DISCONTINUED | OUTPATIENT
Start: 2020-01-01 | End: 2020-01-01

## 2020-01-01 RX ORDER — POTASSIUM CHLORIDE 14.9 MG/ML
20 INJECTION INTRAVENOUS ONCE
Status: COMPLETED | OUTPATIENT
Start: 2020-01-01 | End: 2020-01-01

## 2020-01-01 RX ORDER — HEPARIN SODIUM AND DEXTROSE 10000; 5 [USP'U]/100ML; G/100ML
18 INJECTION INTRAVENOUS ONCE
Status: DISCONTINUED | OUTPATIENT
Start: 2020-01-01 | End: 2020-01-01

## 2020-01-01 RX ORDER — LABETALOL 200 MG/1
200 TABLET, FILM COATED ORAL 2 TIMES DAILY
Status: DISCONTINUED | OUTPATIENT
Start: 2020-01-01 | End: 2020-01-01

## 2020-01-01 RX ORDER — METHYLPREDNISOLONE SODIUM SUCCINATE 40 MG/ML
40 INJECTION, POWDER, LYOPHILIZED, FOR SOLUTION INTRAMUSCULAR; INTRAVENOUS EVERY 8 HOURS
Status: COMPLETED | OUTPATIENT
Start: 2020-01-01 | End: 2020-01-01

## 2020-01-01 RX ORDER — LABETALOL HYDROCHLORIDE 5 MG/ML
5 INJECTION, SOLUTION INTRAVENOUS EVERY 6 HOURS
Status: DISCONTINUED | OUTPATIENT
Start: 2020-01-01 | End: 2020-01-01

## 2020-01-01 RX ORDER — LOSARTAN POTASSIUM 50 MG/1
75 TABLET ORAL DAILY
Qty: 45 TABLET | Refills: 11 | Status: SHIPPED | OUTPATIENT
Start: 2020-01-01

## 2020-01-01 RX ORDER — PANTOPRAZOLE SODIUM 20 MG/1
20 TABLET, DELAYED RELEASE ORAL EVERY 12 HOURS
Status: DISCONTINUED | OUTPATIENT
Start: 2020-01-01 | End: 2020-01-01 | Stop reason: ALTCHOICE

## 2020-01-01 RX ORDER — SENNOSIDES 8.6 MG
17.2 TABLET ORAL NIGHTLY
Status: DISCONTINUED | OUTPATIENT
Start: 2020-01-01 | End: 2020-01-01

## 2020-01-01 RX ORDER — PANTOPRAZOLE SODIUM 20 MG/1
20 TABLET, DELAYED RELEASE ORAL
Status: DISCONTINUED | OUTPATIENT
Start: 2020-01-01 | End: 2020-01-01

## 2020-01-01 RX ORDER — LABETALOL 100 MG/1
100 TABLET, FILM COATED ORAL 2 TIMES DAILY
Status: DISCONTINUED | OUTPATIENT
Start: 2020-01-01 | End: 2020-01-01

## 2020-01-01 RX ORDER — DOCUSATE SODIUM 100 MG/1
100 CAPSULE, LIQUID FILLED ORAL 2 TIMES DAILY
Status: DISCONTINUED | OUTPATIENT
Start: 2020-01-01 | End: 2020-01-01

## 2020-01-01 RX ORDER — HEPARIN SODIUM AND DEXTROSE 10000; 5 [USP'U]/100ML; G/100ML
500 INJECTION INTRAVENOUS CONTINUOUS
Status: DISCONTINUED | OUTPATIENT
Start: 2020-01-01 | End: 2020-01-01

## 2020-01-01 RX ORDER — SODIUM CHLORIDE 9 MG/ML
INJECTION, SOLUTION INTRAVENOUS CONTINUOUS
Status: DISCONTINUED | OUTPATIENT
Start: 2020-01-01 | End: 2020-01-01

## 2020-01-01 RX ORDER — ROCURONIUM BROMIDE 10 MG/ML
INJECTION, SOLUTION INTRAVENOUS AS NEEDED
Status: DISCONTINUED | OUTPATIENT
Start: 2020-01-01 | End: 2020-01-01 | Stop reason: SURG

## 2020-01-01 RX ORDER — IBUPROFEN 600 MG/1
600 TABLET ORAL EVERY 6 HOURS
Status: DISCONTINUED | OUTPATIENT
Start: 2020-01-01 | End: 2020-01-01

## 2020-01-01 RX ORDER — HEPARIN SODIUM AND DEXTROSE 10000; 5 [USP'U]/100ML; G/100ML
INJECTION INTRAVENOUS CONTINUOUS
Status: DISCONTINUED | OUTPATIENT
Start: 2020-01-01 | End: 2020-01-01

## 2020-01-01 RX ORDER — PREDNISONE 20 MG/1
40 TABLET ORAL
Status: DISCONTINUED | OUTPATIENT
Start: 2020-01-01 | End: 2020-01-01

## 2020-01-01 RX ORDER — FUROSEMIDE 10 MG/ML
40 INJECTION INTRAMUSCULAR; INTRAVENOUS ONCE
Status: COMPLETED | OUTPATIENT
Start: 2020-01-01 | End: 2020-01-01

## 2020-01-01 RX ORDER — GABAPENTIN 400 MG/1
400 CAPSULE ORAL NIGHTLY
Status: DISCONTINUED | OUTPATIENT
Start: 2020-01-01 | End: 2020-01-01

## 2020-01-01 RX ORDER — PROCHLORPERAZINE EDISYLATE 5 MG/ML
10 INJECTION INTRAMUSCULAR; INTRAVENOUS EVERY 6 HOURS PRN
Status: DISCONTINUED | OUTPATIENT
Start: 2020-01-01 | End: 2020-01-01

## 2020-01-01 RX ORDER — CHOLECALCIFEROL (VITAMIN D3) 125 MCG
9000 CAPSULE ORAL
Status: DISCONTINUED | OUTPATIENT
Start: 2020-01-01 | End: 2020-01-01

## 2020-01-01 RX ORDER — LORAZEPAM 2 MG/ML
1 INJECTION INTRAMUSCULAR ONCE
Status: COMPLETED | OUTPATIENT
Start: 2020-01-01 | End: 2020-01-01

## 2020-01-01 RX ORDER — DEXTROSE MONOHYDRATE 50 MG/ML
INJECTION, SOLUTION INTRAVENOUS CONTINUOUS
Status: DISCONTINUED | OUTPATIENT
Start: 2020-01-01 | End: 2020-01-01

## 2020-01-01 RX ORDER — CETIRIZINE HYDROCHLORIDE 10 MG/1
10 TABLET ORAL DAILY
Qty: 30 TABLET | Refills: 0 | Status: SHIPPED | OUTPATIENT
Start: 2020-01-01 | End: 2020-01-01

## 2020-01-01 RX ORDER — VANCOMYCIN HYDROCHLORIDE
15 EVERY 24 HOURS
Status: DISCONTINUED | OUTPATIENT
Start: 2020-01-01 | End: 2020-01-01

## 2020-01-01 RX ORDER — PHENYLEPHRINE HCL IN 0.9% NACL 50MG/250ML
PLASTIC BAG, INJECTION (ML) INTRAVENOUS CONTINUOUS
Status: DISCONTINUED | OUTPATIENT
Start: 2020-01-01 | End: 2020-01-01

## 2020-01-01 RX ORDER — ONDANSETRON 4 MG/1
4 TABLET, FILM COATED ORAL EVERY 8 HOURS PRN
Status: DISCONTINUED | OUTPATIENT
Start: 2020-01-01 | End: 2020-01-01

## 2020-01-01 RX ORDER — BISACODYL 10 MG
10 SUPPOSITORY, RECTAL RECTAL ONCE AS NEEDED
Status: ACTIVE | OUTPATIENT
Start: 2020-01-01 | End: 2020-01-01

## 2020-01-01 RX ORDER — TAZOBACTAM SODIUM AND PIPERACILLIN SODIUM 375; 3 MG/50ML; G/50ML
3.38 INJECTION, SOLUTION INTRAVENOUS
COMMUNITY
End: 2020-01-01

## 2020-01-01 RX ORDER — ALBUMIN (HUMAN) 12.5 G/50ML
25 SOLUTION INTRAVENOUS ONCE
Status: COMPLETED | OUTPATIENT
Start: 2020-01-01 | End: 2020-01-01

## 2020-01-01 RX ORDER — ONDANSETRON 4 MG/1
4 TABLET, ORALLY DISINTEGRATING ORAL EVERY 6 HOURS PRN
COMMUNITY
End: 2020-01-01

## 2020-01-01 RX ORDER — ENOXAPARIN SODIUM 100 MG/ML
80 INJECTION SUBCUTANEOUS 2 TIMES DAILY
COMMUNITY

## 2020-01-01 RX ORDER — ONDANSETRON 2 MG/ML
INJECTION INTRAMUSCULAR; INTRAVENOUS
Status: COMPLETED
Start: 2020-01-01 | End: 2020-01-01

## 2020-01-01 RX ORDER — SODIUM CHLORIDE 0.9 % (FLUSH) 0.9 %
10 SYRINGE (ML) INJECTION AS NEEDED
Status: DISCONTINUED | OUTPATIENT
Start: 2020-01-01 | End: 2020-01-01

## 2020-01-01 RX ORDER — SODIUM PHOSPHATE, DIBASIC AND SODIUM PHOSPHATE, MONOBASIC 7; 19 G/133ML; G/133ML
1 ENEMA RECTAL ONCE AS NEEDED
Status: DISCONTINUED | OUTPATIENT
Start: 2020-01-01 | End: 2020-01-01

## 2020-01-01 RX ORDER — MAGNESIUM CARB/ALUMINUM HYDROX 105-160MG
296 TABLET,CHEWABLE ORAL ONCE
Status: COMPLETED | OUTPATIENT
Start: 2020-01-01 | End: 2020-01-01

## 2020-01-01 RX ORDER — MIDAZOLAM HYDROCHLORIDE 1 MG/ML
INJECTION INTRAMUSCULAR; INTRAVENOUS AS NEEDED
Status: DISCONTINUED | OUTPATIENT
Start: 2020-01-01 | End: 2020-01-01 | Stop reason: SURG

## 2020-01-01 RX ORDER — HEPARIN SODIUM 5000 [USP'U]/ML
60 INJECTION INTRAVENOUS; SUBCUTANEOUS ONCE
Status: COMPLETED | OUTPATIENT
Start: 2020-01-01 | End: 2020-01-01

## 2020-01-01 RX ORDER — CLOPIDOGREL BISULFATE 75 MG/1
75 TABLET ORAL DAILY
Status: DISCONTINUED | OUTPATIENT
Start: 2020-01-01 | End: 2020-01-01

## 2020-01-01 RX ORDER — POTASSIUM CHLORIDE 20 MEQ/1
40 TABLET, EXTENDED RELEASE ORAL ONCE
Status: COMPLETED | OUTPATIENT
Start: 2020-01-01 | End: 2020-01-01

## 2020-01-01 RX ORDER — HYDRALAZINE HYDROCHLORIDE 10 MG/1
10 TABLET, FILM COATED ORAL EVERY 6 HOURS PRN
Status: DISCONTINUED | OUTPATIENT
Start: 2020-01-01 | End: 2020-01-01

## 2020-01-01 RX ORDER — GABAPENTIN 400 MG/1
400 CAPSULE ORAL NIGHTLY
Qty: 30 CAPSULE | Refills: 0 | Status: SHIPPED | OUTPATIENT
Start: 2020-01-01

## 2020-01-01 RX ORDER — DIPHENHYDRAMINE HCL 25 MG
25 CAPSULE ORAL EVERY 6 HOURS PRN
COMMUNITY

## 2020-01-01 RX ORDER — DEXTROSE MONOHYDRATE 25 G/50ML
50 INJECTION, SOLUTION INTRAVENOUS
Status: DISCONTINUED | OUTPATIENT
Start: 2020-01-01 | End: 2020-01-01 | Stop reason: HOSPADM

## 2020-01-01 RX ORDER — FUROSEMIDE 10 MG/ML
40 INJECTION INTRAMUSCULAR; INTRAVENOUS DAILY
Status: DISCONTINUED | OUTPATIENT
Start: 2020-01-01 | End: 2020-01-01

## 2020-01-01 RX ORDER — LABETALOL HYDROCHLORIDE 5 MG/ML
5 INJECTION, SOLUTION INTRAVENOUS EVERY 6 HOURS SCHEDULED
Status: DISCONTINUED | OUTPATIENT
Start: 2020-01-01 | End: 2020-01-01

## 2020-01-01 RX ORDER — ONDANSETRON 2 MG/ML
4 INJECTION INTRAMUSCULAR; INTRAVENOUS AS NEEDED
Status: DISCONTINUED | OUTPATIENT
Start: 2020-01-01 | End: 2020-01-01 | Stop reason: HOSPADM

## 2020-01-01 RX ORDER — ASPIRIN 81 MG/1
81 TABLET ORAL DAILY
Status: DISCONTINUED | OUTPATIENT
Start: 2020-01-01 | End: 2020-01-01 | Stop reason: SDUPTHER

## 2020-01-01 RX ORDER — MAGNESIUM OXIDE 400 MG (241.3 MG MAGNESIUM) TABLET
400 TABLET
Status: DISCONTINUED | OUTPATIENT
Start: 2020-01-01 | End: 2020-01-01

## 2020-01-01 RX ORDER — LABETALOL 100 MG/1
50 TABLET, FILM COATED ORAL EVERY 12 HOURS SCHEDULED
Qty: 30 TABLET | Refills: 1 | Status: SHIPPED | OUTPATIENT
Start: 2020-01-01 | End: 2020-01-01

## 2020-01-01 RX ORDER — HYDROMORPHONE HYDROCHLORIDE 1 MG/ML
0.4 INJECTION, SOLUTION INTRAMUSCULAR; INTRAVENOUS; SUBCUTANEOUS EVERY 2 HOUR PRN
Status: DISCONTINUED | OUTPATIENT
Start: 2020-01-01 | End: 2020-01-01

## 2020-01-01 RX ORDER — POLYETHYLENE GLYCOL 3350 17 G/17G
17 POWDER, FOR SOLUTION ORAL DAILY
Status: DISCONTINUED | OUTPATIENT
Start: 2020-01-01 | End: 2020-01-01

## 2020-01-01 RX ORDER — LOSARTAN POTASSIUM 25 MG/1
25 TABLET ORAL DAILY
Status: DISCONTINUED | OUTPATIENT
Start: 2020-01-01 | End: 2020-01-01

## 2020-01-01 RX ORDER — MORPHINE SULFATE 4 MG/ML
2 INJECTION, SOLUTION INTRAMUSCULAR; INTRAVENOUS EVERY 2 HOUR PRN
Status: DISCONTINUED | OUTPATIENT
Start: 2020-01-01 | End: 2020-01-01

## 2020-01-01 RX ORDER — ASPIRIN 81 MG/1
81 TABLET, CHEWABLE ORAL DAILY
Status: DISCONTINUED | OUTPATIENT
Start: 2020-01-01 | End: 2020-01-01

## 2020-01-01 RX ORDER — FAMOTIDINE 10 MG/ML
20 INJECTION, SOLUTION INTRAVENOUS 2 TIMES DAILY
Status: DISCONTINUED | OUTPATIENT
Start: 2020-01-01 | End: 2020-01-01

## 2020-01-01 RX ORDER — METOCLOPRAMIDE HYDROCHLORIDE 5 MG/ML
10 INJECTION INTRAMUSCULAR; INTRAVENOUS EVERY 8 HOURS PRN
Status: DISCONTINUED | OUTPATIENT
Start: 2020-01-01 | End: 2020-01-01

## 2020-01-01 RX ORDER — CETIRIZINE HYDROCHLORIDE 5 MG/1
5 TABLET ORAL DAILY
Status: DISCONTINUED | OUTPATIENT
Start: 2020-01-01 | End: 2020-01-01

## 2020-01-01 RX ORDER — ACETAMINOPHEN 325 MG/1
650 TABLET ORAL EVERY 6 HOURS PRN
Status: DISCONTINUED | OUTPATIENT
Start: 2020-01-01 | End: 2020-01-01

## 2020-01-01 RX ORDER — ZOLPIDEM TARTRATE 5 MG/1
2.5 TABLET ORAL NIGHTLY PRN
Status: DISCONTINUED | OUTPATIENT
Start: 2020-01-01 | End: 2020-01-01

## 2020-01-01 RX ORDER — FUROSEMIDE 10 MG/ML
INJECTION INTRAMUSCULAR; INTRAVENOUS
Status: DISCONTINUED
Start: 2020-01-01 | End: 2020-01-01

## 2020-01-01 RX ORDER — ACETAMINOPHEN AND CODEINE PHOSPHATE 300; 30 MG/1; MG/1
1 TABLET ORAL EVERY 4 HOURS PRN
Status: DISCONTINUED | OUTPATIENT
Start: 2020-01-01 | End: 2020-01-01

## 2020-01-01 RX ORDER — HEPARIN SODIUM 5000 [USP'U]/ML
INJECTION, SOLUTION INTRAVENOUS; SUBCUTANEOUS
Status: COMPLETED
Start: 2020-01-01 | End: 2020-01-01

## 2020-01-01 RX ORDER — ECHINACEA PURPUREA EXTRACT 125 MG
1 TABLET ORAL
Status: DISCONTINUED | OUTPATIENT
Start: 2020-01-01 | End: 2020-01-01

## 2020-01-01 RX ORDER — DOCUSATE SODIUM 100 MG/1
100 CAPSULE, LIQUID FILLED ORAL 2 TIMES DAILY
COMMUNITY
End: 2020-01-01

## 2020-01-01 RX ORDER — ONDANSETRON 4 MG/1
4 TABLET, FILM COATED ORAL EVERY 8 HOURS PRN
Status: DISCONTINUED | OUTPATIENT
Start: 2020-01-01 | End: 2020-01-01 | Stop reason: DRUGHIGH

## 2020-01-01 RX ORDER — PANTOPRAZOLE SODIUM 40 MG/1
40 TABLET, DELAYED RELEASE ORAL
Status: DISCONTINUED | OUTPATIENT
Start: 2020-01-01 | End: 2020-01-01

## 2020-01-01 RX ORDER — CLOPIDOGREL BISULFATE 75 MG/1
75 TABLET ORAL ONCE
Status: COMPLETED | OUTPATIENT
Start: 2020-01-01 | End: 2020-01-01

## 2020-01-01 RX ORDER — PROCHLORPERAZINE 25 MG
25 SUPPOSITORY, RECTAL RECTAL EVERY 12 HOURS PRN
Status: DISCONTINUED | OUTPATIENT
Start: 2020-01-01 | End: 2020-01-01

## 2020-01-01 RX ORDER — DIPHENHYDRAMINE HYDROCHLORIDE 50 MG/ML
12.5 INJECTION INTRAMUSCULAR; INTRAVENOUS EVERY 4 HOURS PRN
Status: DISCONTINUED | OUTPATIENT
Start: 2020-01-01 | End: 2020-01-01

## 2020-01-01 RX ORDER — ACETAMINOPHEN 325 MG/1
650 TABLET ORAL EVERY 4 HOURS PRN
Status: DISCONTINUED | OUTPATIENT
Start: 2020-01-01 | End: 2020-01-01

## 2020-01-01 RX ORDER — ALPRAZOLAM 0.25 MG/1
0.25 TABLET ORAL 2 TIMES DAILY PRN
Status: DISCONTINUED | OUTPATIENT
Start: 2020-01-01 | End: 2020-01-01

## 2020-01-01 RX ORDER — BISACODYL 10 MG
10 SUPPOSITORY, RECTAL RECTAL
Status: DISCONTINUED | OUTPATIENT
Start: 2020-01-01 | End: 2020-01-01

## 2020-01-01 RX ORDER — OMEPRAZOLE 40 MG/1
40 CAPSULE, DELAYED RELEASE ORAL DAILY
COMMUNITY

## 2020-01-01 RX ORDER — HEPARIN SODIUM AND DEXTROSE 10000; 5 [USP'U]/100ML; G/100ML
12 INJECTION INTRAVENOUS ONCE
Status: COMPLETED | OUTPATIENT
Start: 2020-01-01 | End: 2020-01-01

## 2020-01-01 RX ORDER — ENOXAPARIN SODIUM 100 MG/ML
1 INJECTION SUBCUTANEOUS EVERY 12 HOURS SCHEDULED
Status: DISCONTINUED | OUTPATIENT
Start: 2020-01-01 | End: 2020-01-01

## 2020-01-01 RX ORDER — TRAMADOL HYDROCHLORIDE 50 MG/1
50 TABLET ORAL 3 TIMES DAILY
Qty: 30 TABLET | Refills: 0 | Status: SHIPPED | OUTPATIENT
Start: 2020-01-01 | End: 2020-01-01

## 2020-01-01 RX ORDER — ASPIRIN 300 MG
300 SUPPOSITORY, RECTAL RECTAL DAILY
Status: DISCONTINUED | OUTPATIENT
Start: 2020-01-01 | End: 2020-01-01

## 2020-01-01 RX ORDER — HEPARIN SODIUM 5000 [USP'U]/ML
80 INJECTION INTRAVENOUS; SUBCUTANEOUS ONCE
Status: DISCONTINUED | OUTPATIENT
Start: 2020-01-01 | End: 2020-01-01

## 2020-01-01 RX ORDER — INSULIN ASPART 100 [IU]/ML
INJECTION, SOLUTION INTRAVENOUS; SUBCUTANEOUS
Status: COMPLETED
Start: 2020-01-01 | End: 2020-01-01

## 2020-01-01 RX ORDER — SODIUM CHLORIDE, SODIUM LACTATE, POTASSIUM CHLORIDE, CALCIUM CHLORIDE 600; 310; 30; 20 MG/100ML; MG/100ML; MG/100ML; MG/100ML
INJECTION, SOLUTION INTRAVENOUS CONTINUOUS
Status: DISCONTINUED | OUTPATIENT
Start: 2020-01-01 | End: 2020-01-01 | Stop reason: HOSPADM

## 2020-01-01 RX ORDER — ALPRAZOLAM 0.25 MG/1
0.25 TABLET ORAL EVERY 6 HOURS PRN
Status: DISCONTINUED | OUTPATIENT
Start: 2020-01-01 | End: 2020-01-01

## 2020-01-01 RX ORDER — FAMOTIDINE 20 MG/1
40 TABLET ORAL EVERY MORNING
Status: DISCONTINUED | OUTPATIENT
Start: 2020-01-01 | End: 2020-01-01

## 2020-01-01 RX ORDER — ONDANSETRON 4 MG/1
4 TABLET, ORALLY DISINTEGRATING ORAL EVERY 6 HOURS PRN
COMMUNITY

## 2020-01-01 RX ORDER — LABETALOL 100 MG/1
50 TABLET, FILM COATED ORAL EVERY 12 HOURS SCHEDULED
Status: DISCONTINUED | OUTPATIENT
Start: 2020-01-01 | End: 2020-01-01

## 2020-01-01 RX ORDER — METOCLOPRAMIDE HYDROCHLORIDE 5 MG/ML
5 INJECTION INTRAMUSCULAR; INTRAVENOUS EVERY 6 HOURS
Status: DISCONTINUED | OUTPATIENT
Start: 2020-01-01 | End: 2020-01-01

## 2020-01-01 RX ORDER — DIPHENHYDRAMINE HCL 25 MG
25 CAPSULE ORAL EVERY 8 HOURS
Status: DISCONTINUED | OUTPATIENT
Start: 2020-01-01 | End: 2020-01-01

## 2020-01-01 RX ORDER — PREDNISONE 20 MG/1
20 TABLET ORAL
Status: COMPLETED | OUTPATIENT
Start: 2020-01-01 | End: 2020-01-01

## 2020-01-01 RX ORDER — PANTOPRAZOLE SODIUM 40 MG/1
40 TABLET, DELAYED RELEASE ORAL
Qty: 30 TABLET | Refills: 0 | Status: SHIPPED | OUTPATIENT
Start: 2020-01-01 | End: 2020-01-01

## 2020-01-01 RX ORDER — MAGNESIUM OXIDE 400 MG (241.3 MG MAGNESIUM) TABLET
400 TABLET 3 TIMES DAILY
Status: DISCONTINUED | OUTPATIENT
Start: 2020-01-01 | End: 2020-01-01

## 2020-01-01 RX ORDER — DEXTROSE, SODIUM CHLORIDE, AND POTASSIUM CHLORIDE 5; .9; .15 G/100ML; G/100ML; G/100ML
INJECTION INTRAVENOUS CONTINUOUS
Status: DISCONTINUED | OUTPATIENT
Start: 2020-01-01 | End: 2020-01-01

## 2020-01-01 RX ORDER — CEFAZOLIN SODIUM/WATER 2 G/20 ML
2 SYRINGE (ML) INTRAVENOUS EVERY 8 HOURS
Status: COMPLETED | OUTPATIENT
Start: 2020-01-01 | End: 2020-01-01

## 2020-01-01 RX ORDER — NALOXONE HYDROCHLORIDE 0.4 MG/ML
80 INJECTION, SOLUTION INTRAMUSCULAR; INTRAVENOUS; SUBCUTANEOUS AS NEEDED
Status: DISCONTINUED | OUTPATIENT
Start: 2020-01-01 | End: 2020-01-01 | Stop reason: HOSPADM

## 2020-01-01 RX ORDER — FAMOTIDINE 20 MG/1
20 TABLET ORAL 2 TIMES DAILY
COMMUNITY

## 2020-01-01 RX ORDER — FAMOTIDINE 20 MG/1
40 TABLET ORAL DAILY
Status: DISCONTINUED | OUTPATIENT
Start: 2020-01-01 | End: 2020-01-01

## 2020-01-01 RX ORDER — HYDRALAZINE HYDROCHLORIDE 10 MG/1
10 TABLET, FILM COATED ORAL EVERY 6 HOURS PRN
COMMUNITY

## 2020-01-01 RX ORDER — FAMOTIDINE 20 MG/1
20 TABLET ORAL 2 TIMES DAILY
Status: DISCONTINUED | OUTPATIENT
Start: 2020-01-01 | End: 2020-01-01

## 2020-01-01 RX ORDER — FLUCONAZOLE 2 MG/ML
200 INJECTION, SOLUTION INTRAVENOUS EVERY 24 HOURS
Status: DISCONTINUED | OUTPATIENT
Start: 2020-01-01 | End: 2020-01-01

## 2020-01-01 RX ORDER — ENOXAPARIN SODIUM 100 MG/ML
80 INJECTION SUBCUTANEOUS 2 TIMES DAILY
Status: DISCONTINUED | OUTPATIENT
Start: 2020-01-01 | End: 2020-01-01

## 2020-01-01 RX ORDER — PREDNISONE 20 MG/1
20 TABLET ORAL DAILY
Status: DISCONTINUED | OUTPATIENT
Start: 2020-01-01 | End: 2020-01-01

## 2020-01-01 RX ORDER — DEXMEDETOMIDINE HYDROCHLORIDE 4 UG/ML
INJECTION, SOLUTION INTRAVENOUS CONTINUOUS
Status: DISCONTINUED | OUTPATIENT
Start: 2020-01-01 | End: 2020-01-01

## 2020-01-01 RX ORDER — PHENYLEPHRINE HCL IN 0.9% NACL 50MG/250ML
PLASTIC BAG, INJECTION (ML) INTRAVENOUS CONTINUOUS PRN
Status: DISCONTINUED | OUTPATIENT
Start: 2020-01-01 | End: 2020-01-01 | Stop reason: HOSPADM

## 2020-01-01 RX ORDER — DIPHENHYDRAMINE HCL 25 MG
25 CAPSULE ORAL EVERY 6 HOURS PRN
Status: DISCONTINUED | OUTPATIENT
Start: 2020-01-01 | End: 2020-01-01

## 2020-01-01 RX ORDER — FAMOTIDINE 20 MG/1
20 TABLET ORAL DAILY
Status: DISCONTINUED | OUTPATIENT
Start: 2020-01-01 | End: 2020-01-01

## 2020-01-01 RX ORDER — ASPIRIN 81 MG/1
81 TABLET ORAL DAILY
Status: DISCONTINUED | OUTPATIENT
Start: 2020-01-01 | End: 2020-01-01

## 2020-01-01 RX ORDER — CETIRIZINE HYDROCHLORIDE 10 MG/1
10 TABLET ORAL 2 TIMES DAILY WITH MEALS
COMMUNITY

## 2020-01-01 RX ORDER — SODIUM CHLORIDE 9 MG/ML
INJECTION, SOLUTION INTRAVENOUS ONCE
Status: DISCONTINUED | OUTPATIENT
Start: 2020-01-01 | End: 2020-01-01

## 2020-01-01 RX ORDER — POLYETHYLENE GLYCOL 3350 17 G/17G
17 POWDER, FOR SOLUTION ORAL DAILY PRN
Status: DISCONTINUED | OUTPATIENT
Start: 2020-01-01 | End: 2020-01-01

## 2020-01-01 RX ORDER — GABAPENTIN 100 MG/1
CAPSULE ORAL
Qty: 270 CAPSULE | Refills: 3 | Status: ON HOLD | OUTPATIENT
Start: 2020-01-01 | End: 2020-01-01

## 2020-01-01 RX ORDER — METHYLPREDNISOLONE SODIUM SUCCINATE 40 MG/ML
40 INJECTION, POWDER, LYOPHILIZED, FOR SOLUTION INTRAMUSCULAR; INTRAVENOUS ONCE
Status: DISCONTINUED | OUTPATIENT
Start: 2020-01-01 | End: 2020-01-01

## 2020-01-01 RX ORDER — HEPARIN SODIUM AND DEXTROSE 10000; 5 [USP'U]/100ML; G/100ML
INJECTION INTRAVENOUS CONTINUOUS
Status: DISPENSED | OUTPATIENT
Start: 2020-01-01 | End: 2020-01-01

## 2020-01-01 RX ORDER — LABETALOL HYDROCHLORIDE 5 MG/ML
10 INJECTION, SOLUTION INTRAVENOUS EVERY 4 HOURS PRN
Status: DISCONTINUED | OUTPATIENT
Start: 2020-01-01 | End: 2020-01-01

## 2020-01-01 RX ORDER — LOSARTAN POTASSIUM 25 MG/1
25 TABLET ORAL DAILY
Qty: 30 TABLET | Refills: 0 | Status: SHIPPED | OUTPATIENT
Start: 2020-01-01 | End: 2020-01-01

## 2020-01-01 RX ORDER — INSULIN ASPART 100 [IU]/ML
INJECTION, SOLUTION INTRAVENOUS; SUBCUTANEOUS ONCE
Status: COMPLETED | OUTPATIENT
Start: 2020-01-01 | End: 2020-01-01

## 2020-01-01 RX ORDER — BUPIVACAINE HYDROCHLORIDE 5 MG/ML
INJECTION, SOLUTION EPIDURAL; INTRACAUDAL AS NEEDED
Status: DISCONTINUED | OUTPATIENT
Start: 2020-01-01 | End: 2020-01-01 | Stop reason: HOSPADM

## 2020-01-01 RX ORDER — DIPHENHYDRAMINE HYDROCHLORIDE 50 MG/ML
12.5 INJECTION INTRAMUSCULAR; INTRAVENOUS EVERY 8 HOURS
Status: DISCONTINUED | OUTPATIENT
Start: 2020-01-01 | End: 2020-01-01

## 2020-01-01 RX ORDER — TRAMADOL HYDROCHLORIDE 50 MG/1
50 TABLET ORAL 3 TIMES DAILY
Status: DISCONTINUED | OUTPATIENT
Start: 2020-01-01 | End: 2020-01-01

## 2020-01-01 RX ORDER — PANTOPRAZOLE SODIUM 40 MG/1
40 TABLET, DELAYED RELEASE ORAL
COMMUNITY
End: 2020-01-01

## 2020-01-01 RX ORDER — ROSUVASTATIN CALCIUM 20 MG/1
20 TABLET, COATED ORAL DAILY
Status: DISCONTINUED | OUTPATIENT
Start: 2020-01-01 | End: 2020-01-01

## 2020-01-01 RX ORDER — ONDANSETRON 2 MG/ML
4 INJECTION INTRAMUSCULAR; INTRAVENOUS EVERY 8 HOURS PRN
Status: DISCONTINUED | OUTPATIENT
Start: 2020-01-01 | End: 2020-01-01

## 2020-01-01 RX ORDER — ROSUVASTATIN CALCIUM 20 MG/1
20 TABLET, COATED ORAL DAILY
Qty: 30 TABLET | Refills: 0 | Status: SHIPPED | OUTPATIENT
Start: 2020-01-01

## 2020-01-01 RX ORDER — LABETALOL HYDROCHLORIDE 5 MG/ML
10 INJECTION, SOLUTION INTRAVENOUS EVERY 12 HOURS SCHEDULED
Status: DISCONTINUED | OUTPATIENT
Start: 2020-01-01 | End: 2020-01-01

## 2020-01-01 RX ORDER — ALBUMIN, HUMAN INJ 5% 5 %
250 SOLUTION INTRAVENOUS ONCE
Status: DISCONTINUED | OUTPATIENT
Start: 2020-01-01 | End: 2020-01-01

## 2020-01-01 RX ORDER — PROCHLORPERAZINE MALEATE 10 MG
10 TABLET ORAL EVERY 12 HOURS PRN
Status: DISCONTINUED | OUTPATIENT
Start: 2020-01-01 | End: 2020-01-01

## 2020-01-01 RX ORDER — SODIUM CHLORIDE 0.9 % (FLUSH) 0.9 %
10 SYRINGE (ML) INJECTION EVERY 12 HOURS
Status: DISCONTINUED | OUTPATIENT
Start: 2020-01-01 | End: 2020-01-01

## 2020-01-01 RX ORDER — HYDRALAZINE HYDROCHLORIDE 20 MG/ML
INJECTION INTRAMUSCULAR; INTRAVENOUS AS NEEDED
Status: DISCONTINUED | OUTPATIENT
Start: 2020-01-01 | End: 2020-01-01 | Stop reason: SURG

## 2020-01-01 RX ORDER — SODIUM CHLORIDE 9 MG/ML
INJECTION, SOLUTION INTRAVENOUS CONTINUOUS
Status: ACTIVE | OUTPATIENT
Start: 2020-01-01 | End: 2020-01-01

## 2020-01-01 RX ORDER — MORPHINE SULFATE 4 MG/ML
4 INJECTION, SOLUTION INTRAMUSCULAR; INTRAVENOUS EVERY 2 HOUR PRN
Status: DISCONTINUED | OUTPATIENT
Start: 2020-01-01 | End: 2020-01-01

## 2020-01-01 RX ORDER — MAGNESIUM SULFATE HEPTAHYDRATE 40 MG/ML
2 INJECTION, SOLUTION INTRAVENOUS ONCE
Status: COMPLETED | OUTPATIENT
Start: 2020-01-01 | End: 2020-01-01

## 2020-01-01 RX ORDER — CETIRIZINE HYDROCHLORIDE 10 MG/1
10 TABLET ORAL DAILY
Status: DISCONTINUED | OUTPATIENT
Start: 2020-01-01 | End: 2020-01-01

## 2020-01-01 RX ORDER — FUROSEMIDE 10 MG/ML
INJECTION INTRAMUSCULAR; INTRAVENOUS
Status: DISPENSED
Start: 2020-01-01 | End: 2020-01-01

## 2020-01-01 RX ORDER — LIDOCAINE HYDROCHLORIDE 10 MG/ML
INJECTION, SOLUTION EPIDURAL; INFILTRATION; INTRACAUDAL; PERINEURAL AS NEEDED
Status: DISCONTINUED | OUTPATIENT
Start: 2020-01-01 | End: 2020-01-01 | Stop reason: SURG

## 2020-01-01 RX ORDER — PHENYLEPHRINE HCL IN 0.9% NACL 50MG/250ML
PLASTIC BAG, INJECTION (ML) INTRAVENOUS
Status: DISPENSED
Start: 2020-01-01 | End: 2020-01-01

## 2020-01-01 RX ORDER — ACETAMINOPHEN 325 MG/1
650 TABLET ORAL 2 TIMES DAILY
Status: DISCONTINUED | OUTPATIENT
Start: 2020-01-01 | End: 2020-01-01 | Stop reason: ALTCHOICE

## 2020-01-01 RX ORDER — TRAMADOL HYDROCHLORIDE 50 MG/1
50 TABLET ORAL EVERY 12 HOURS
Status: DISCONTINUED | OUTPATIENT
Start: 2020-01-01 | End: 2020-01-01

## 2020-01-01 RX ORDER — LABETALOL HYDROCHLORIDE 5 MG/ML
10 INJECTION, SOLUTION INTRAVENOUS EVERY 10 MIN PRN
Status: DISCONTINUED | OUTPATIENT
Start: 2020-01-01 | End: 2020-01-01

## 2020-01-01 RX ORDER — LOSARTAN POTASSIUM 50 MG/1
50 TABLET ORAL 2 TIMES DAILY
Status: DISCONTINUED | OUTPATIENT
Start: 2020-01-01 | End: 2020-01-01

## 2020-01-01 RX ORDER — DEXAMETHASONE SODIUM PHOSPHATE 4 MG/ML
VIAL (ML) INJECTION AS NEEDED
Status: DISCONTINUED | OUTPATIENT
Start: 2020-01-01 | End: 2020-01-01 | Stop reason: SURG

## 2020-01-01 RX ORDER — HYDROMORPHONE HYDROCHLORIDE 1 MG/ML
0.4 INJECTION, SOLUTION INTRAMUSCULAR; INTRAVENOUS; SUBCUTANEOUS EVERY 5 MIN PRN
Status: DISCONTINUED | OUTPATIENT
Start: 2020-01-01 | End: 2020-01-01 | Stop reason: HOSPADM

## 2020-01-01 RX ORDER — AMLODIPINE BESYLATE 5 MG/1
10 TABLET ORAL DAILY
Status: DISCONTINUED | OUTPATIENT
Start: 2020-01-01 | End: 2020-01-01

## 2020-01-01 RX ORDER — SCOLOPAMINE TRANSDERMAL SYSTEM 1 MG/1
1 PATCH, EXTENDED RELEASE TRANSDERMAL
Status: DISCONTINUED | OUTPATIENT
Start: 2020-01-01 | End: 2020-01-01

## 2020-01-01 RX ORDER — SODIUM CHLORIDE 9 MG/ML
INJECTION, SOLUTION INTRAVENOUS ONCE
Status: COMPLETED | OUTPATIENT
Start: 2020-01-01 | End: 2020-01-01

## 2020-01-01 RX ORDER — HYDROMORPHONE HYDROCHLORIDE 1 MG/ML
0.8 INJECTION, SOLUTION INTRAMUSCULAR; INTRAVENOUS; SUBCUTANEOUS EVERY 2 HOUR PRN
Status: DISCONTINUED | OUTPATIENT
Start: 2020-01-01 | End: 2020-01-01

## 2020-01-01 RX ORDER — METOPROLOL TARTRATE 5 MG/5ML
5 INJECTION INTRAVENOUS EVERY 8 HOURS
Status: DISCONTINUED | OUTPATIENT
Start: 2020-01-01 | End: 2020-01-01

## 2020-01-01 RX ORDER — ONDANSETRON 4 MG/1
4 TABLET, FILM COATED ORAL DAILY
COMMUNITY

## 2020-01-01 RX ORDER — HYDRALAZINE HYDROCHLORIDE 10 MG/1
10 TABLET, FILM COATED ORAL EVERY 8 HOURS SCHEDULED
Status: DISCONTINUED | OUTPATIENT
Start: 2020-01-01 | End: 2020-01-01

## 2020-01-01 RX ORDER — METOCLOPRAMIDE HYDROCHLORIDE 5 MG/ML
5 INJECTION INTRAMUSCULAR; INTRAVENOUS EVERY 8 HOURS PRN
Status: DISCONTINUED | OUTPATIENT
Start: 2020-01-01 | End: 2020-01-01

## 2020-01-01 RX ORDER — LORAZEPAM 2 MG/ML
2 INJECTION INTRAMUSCULAR EVERY 4 HOURS PRN
Status: DISCONTINUED | OUTPATIENT
Start: 2020-01-01 | End: 2020-01-01

## 2020-01-01 RX ORDER — FAMOTIDINE 10 MG/ML
INJECTION, SOLUTION INTRAVENOUS
Status: DISPENSED
Start: 2020-01-01 | End: 2020-01-01

## 2020-01-01 RX ORDER — RESVER/WINE/BFL/GRPSD/PC/C/POM 200MG-60MG
CAPSULE ORAL
Refills: 0 | COMMUNITY
Start: 2019-01-01

## 2020-01-01 RX ORDER — SENNA PLUS 8.6 MG/1
2 TABLET ORAL NIGHTLY
COMMUNITY
End: 2020-01-01

## 2020-01-01 RX ORDER — FAMOTIDINE 10 MG/ML
20 INJECTION, SOLUTION INTRAVENOUS DAILY
Status: DISCONTINUED | OUTPATIENT
Start: 2020-01-01 | End: 2020-01-01

## 2020-01-01 RX ORDER — TRIAMCINOLONE ACETONIDE 40 MG/ML
40 INJECTION, SUSPENSION INTRA-ARTICULAR; INTRAMUSCULAR ONCE
Status: COMPLETED | OUTPATIENT
Start: 2020-01-01 | End: 2020-01-01

## 2020-01-01 RX ORDER — ALBUMIN (HUMAN) 12.5 G/50ML
SOLUTION INTRAVENOUS
Status: DISPENSED
Start: 2020-01-01 | End: 2020-01-01

## 2020-01-01 RX ORDER — ECHINACEA PURPUREA EXTRACT 125 MG
2 TABLET ORAL AS NEEDED
Status: DISCONTINUED | OUTPATIENT
Start: 2020-01-01 | End: 2020-01-01

## 2020-01-01 RX ORDER — PANTOPRAZOLE SODIUM 20 MG/1
20 TABLET, DELAYED RELEASE ORAL
Status: DISCONTINUED | OUTPATIENT
Start: 2020-01-01 | End: 2020-01-01 | Stop reason: SDUPTHER

## 2020-01-01 RX ORDER — ASPIRIN 81 MG/1
81 TABLET, CHEWABLE ORAL DAILY
Qty: 30 TABLET | Refills: 0 | Status: SHIPPED | OUTPATIENT
Start: 2020-01-01 | End: 2020-01-01

## 2020-01-01 RX ORDER — SODIUM CHLORIDE, SODIUM LACTATE, POTASSIUM CHLORIDE, CALCIUM CHLORIDE 600; 310; 30; 20 MG/100ML; MG/100ML; MG/100ML; MG/100ML
INJECTION, SOLUTION INTRAVENOUS CONTINUOUS
Status: DISCONTINUED | OUTPATIENT
Start: 2020-01-01 | End: 2020-01-01

## 2020-01-01 RX ORDER — METHYLPREDNISOLONE SODIUM SUCCINATE 40 MG/ML
40 INJECTION, POWDER, LYOPHILIZED, FOR SOLUTION INTRAMUSCULAR; INTRAVENOUS EVERY 8 HOURS
Status: DISCONTINUED | OUTPATIENT
Start: 2020-01-01 | End: 2020-01-01

## 2020-01-01 RX ORDER — ZOLPIDEM TARTRATE 5 MG/1
5 TABLET ORAL NIGHTLY PRN
Status: DISCONTINUED | OUTPATIENT
Start: 2020-01-01 | End: 2020-01-01

## 2020-01-01 RX ORDER — VANCOMYCIN 2 GRAM/500 ML IN 0.9 % SODIUM CHLORIDE INTRAVENOUS
25 ONCE
Status: COMPLETED | OUTPATIENT
Start: 2020-01-01 | End: 2020-01-01

## 2020-01-01 RX ORDER — PHENYLEPHRINE HCL 10 MG/ML
VIAL (ML) INJECTION AS NEEDED
Status: DISCONTINUED | OUTPATIENT
Start: 2020-01-01 | End: 2020-01-01 | Stop reason: SURG

## 2020-01-01 RX ORDER — INSULIN ASPART 100 [IU]/ML
INJECTION, SOLUTION INTRAVENOUS; SUBCUTANEOUS ONCE
Status: DISCONTINUED | OUTPATIENT
Start: 2020-01-01 | End: 2020-01-01 | Stop reason: HOSPADM

## 2020-01-01 RX ORDER — TRAMADOL HYDROCHLORIDE 50 MG/1
50 TABLET ORAL EVERY 12 HOURS PRN
Status: DISCONTINUED | OUTPATIENT
Start: 2020-01-01 | End: 2020-01-01

## 2020-01-01 RX ORDER — GABAPENTIN 400 MG/1
400 CAPSULE ORAL NIGHTLY
Qty: 30 CAPSULE | Refills: 0 | Status: SHIPPED | OUTPATIENT
Start: 2020-01-01 | End: 2020-01-01

## 2020-01-01 RX ORDER — DIPHENHYDRAMINE HCL 25 MG
25 CAPSULE ORAL EVERY 8 HOURS
Status: COMPLETED | OUTPATIENT
Start: 2020-01-01 | End: 2020-01-01

## 2020-01-01 RX ORDER — LORAZEPAM 2 MG/ML
1 INJECTION INTRAMUSCULAR EVERY 4 HOURS PRN
Status: DISCONTINUED | OUTPATIENT
Start: 2020-01-01 | End: 2020-01-01

## 2020-01-01 RX ORDER — ACETAMINOPHEN 650 MG/1
650 SUPPOSITORY RECTAL EVERY 4 HOURS PRN
Status: DISCONTINUED | OUTPATIENT
Start: 2020-01-01 | End: 2020-01-01

## 2020-01-01 RX ORDER — LORAZEPAM 2 MG/ML
0.5 INJECTION INTRAMUSCULAR EVERY 6 HOURS PRN
Status: DISCONTINUED | OUTPATIENT
Start: 2020-01-01 | End: 2020-01-01

## 2020-01-01 RX ORDER — SODIUM PHOSPHATE, DIBASIC AND SODIUM PHOSPHATE, MONOBASIC 7; 19 G/133ML; G/133ML
1 ENEMA RECTAL
Status: COMPLETED | OUTPATIENT
Start: 2020-01-01 | End: 2020-01-01

## 2020-01-01 RX ORDER — ZOLPIDEM TARTRATE 5 MG/1
5 TABLET ORAL NIGHTLY PRN
COMMUNITY

## 2020-01-01 RX ORDER — LABETALOL HYDROCHLORIDE 5 MG/ML
10 INJECTION, SOLUTION INTRAVENOUS ONCE
Status: COMPLETED | OUTPATIENT
Start: 2020-01-01 | End: 2020-01-01

## 2020-01-01 RX ORDER — DEXAMETHASONE SODIUM PHOSPHATE 4 MG/ML
4 VIAL (ML) INJECTION AS NEEDED
Status: DISCONTINUED | OUTPATIENT
Start: 2020-01-01 | End: 2020-01-01 | Stop reason: HOSPADM

## 2020-01-01 RX ORDER — SODIUM CHLORIDE 450 MG/100ML
INJECTION, SOLUTION INTRAVENOUS CONTINUOUS
Status: DISCONTINUED | OUTPATIENT
Start: 2020-01-01 | End: 2020-01-01

## 2020-01-01 RX ORDER — LORAZEPAM 2 MG/ML
0.5 INJECTION INTRAMUSCULAR
Status: DISCONTINUED | OUTPATIENT
Start: 2020-01-01 | End: 2020-01-01

## 2020-01-01 RX ORDER — HYDROCODONE BITARTRATE AND ACETAMINOPHEN 5; 325 MG/1; MG/1
1 TABLET ORAL EVERY 4 HOURS PRN
COMMUNITY

## 2020-01-01 RX ORDER — METHYLPREDNISOLONE SODIUM SUCCINATE 40 MG/ML
INJECTION, POWDER, LYOPHILIZED, FOR SOLUTION INTRAMUSCULAR; INTRAVENOUS
Status: DISPENSED
Start: 2020-01-01 | End: 2020-01-01

## 2020-01-01 RX ORDER — ROSUVASTATIN CALCIUM 20 MG/1
20 TABLET, COATED ORAL NIGHTLY
Status: DISCONTINUED | OUTPATIENT
Start: 2020-01-01 | End: 2020-01-01

## 2020-01-01 RX ORDER — LORAZEPAM 2 MG/ML
0.5 INJECTION INTRAMUSCULAR EVERY 4 HOURS PRN
Status: DISCONTINUED | OUTPATIENT
Start: 2020-01-01 | End: 2020-01-01

## 2020-01-01 RX ORDER — LIDOCAINE HYDROCHLORIDE 10 MG/ML
9 INJECTION, SOLUTION INFILTRATION; PERINEURAL ONCE
Status: COMPLETED | OUTPATIENT
Start: 2020-01-01 | End: 2020-01-01

## 2020-01-01 RX ORDER — MIDAZOLAM HYDROCHLORIDE 1 MG/ML
INJECTION INTRAMUSCULAR; INTRAVENOUS
Status: COMPLETED
Start: 2020-01-01 | End: 2020-01-01

## 2020-01-01 RX ORDER — METOCLOPRAMIDE HYDROCHLORIDE 5 MG/ML
10 INJECTION INTRAMUSCULAR; INTRAVENOUS AS NEEDED
Status: DISCONTINUED | OUTPATIENT
Start: 2020-01-01 | End: 2020-01-01 | Stop reason: HOSPADM

## 2020-01-01 RX ORDER — ENOXAPARIN SODIUM 100 MG/ML
80 INJECTION SUBCUTANEOUS EVERY 12 HOURS SCHEDULED
Status: DISCONTINUED | OUTPATIENT
Start: 2020-01-01 | End: 2020-01-01

## 2020-01-01 RX ORDER — ACETAMINOPHEN 325 MG/1
650 TABLET ORAL 3 TIMES DAILY
Status: DISCONTINUED | OUTPATIENT
Start: 2020-01-01 | End: 2020-01-01

## 2020-01-01 RX ORDER — ENOXAPARIN SODIUM 100 MG/ML
40 INJECTION SUBCUTANEOUS DAILY
Status: DISCONTINUED | OUTPATIENT
Start: 2020-01-01 | End: 2020-01-01

## 2020-01-01 RX ORDER — NEOSTIGMINE METHYLSULFATE 1 MG/ML
INJECTION INTRAVENOUS AS NEEDED
Status: DISCONTINUED | OUTPATIENT
Start: 2020-01-01 | End: 2020-01-01 | Stop reason: SURG

## 2020-01-01 RX ORDER — ECHINACEA PURPUREA EXTRACT 125 MG
2 TABLET ORAL AS NEEDED
COMMUNITY

## 2020-01-01 RX ORDER — FAMOTIDINE 10 MG/ML
20 INJECTION, SOLUTION INTRAVENOUS 2 TIMES DAILY
Status: CANCELLED | OUTPATIENT
Start: 2020-01-01

## 2020-01-01 RX ORDER — HEPARIN SODIUM 5000 [USP'U]/ML
30 INJECTION INTRAVENOUS; SUBCUTANEOUS ONCE
Status: DISCONTINUED | OUTPATIENT
Start: 2020-01-01 | End: 2020-01-01

## 2020-01-01 RX ORDER — LORAZEPAM 2 MG/ML
1 INJECTION INTRAMUSCULAR
Status: DISCONTINUED | OUTPATIENT
Start: 2020-01-01 | End: 2020-01-01

## 2020-01-01 RX ORDER — ADENOSINE 3 MG/ML
INJECTION, SOLUTION INTRAVENOUS
Status: COMPLETED
Start: 2020-01-01 | End: 2020-01-01

## 2020-01-01 RX ORDER — FUROSEMIDE 10 MG/ML
40 INJECTION INTRAMUSCULAR; INTRAVENOUS
Status: DISCONTINUED | OUTPATIENT
Start: 2020-01-01 | End: 2020-01-01

## 2020-01-01 RX ORDER — ASPIRIN 300 MG
300 SUPPOSITORY, RECTAL RECTAL ONCE
Status: COMPLETED | OUTPATIENT
Start: 2020-01-01 | End: 2020-01-01

## 2020-01-01 RX ORDER — AMINOPHYLLINE DIHYDRATE 25 MG/ML
INJECTION, SOLUTION INTRAVENOUS
Status: COMPLETED
Start: 2020-01-01 | End: 2020-01-01

## 2020-01-01 RX ORDER — ALPRAZOLAM 0.5 MG/1
0.5 TABLET ORAL NIGHTLY PRN
Status: DISCONTINUED | OUTPATIENT
Start: 2020-01-01 | End: 2020-01-01

## 2020-01-01 RX ORDER — LABETALOL 100 MG/1
100 TABLET, FILM COATED ORAL EVERY 12 HOURS SCHEDULED
Status: DISCONTINUED | OUTPATIENT
Start: 2020-01-01 | End: 2020-01-01

## 2020-01-01 RX ORDER — OMEPRAZOLE 20 MG/1
40 CAPSULE, DELAYED RELEASE ORAL
COMMUNITY
End: 2020-01-01

## 2020-01-01 RX ORDER — LABETALOL HYDROCHLORIDE 5 MG/ML
INJECTION, SOLUTION INTRAVENOUS
Status: DISPENSED
Start: 2020-01-01 | End: 2020-01-01

## 2020-01-01 RX ORDER — DIPHENHYDRAMINE HCL 25 MG
25 CAPSULE ORAL EVERY 4 HOURS PRN
Status: DISCONTINUED | OUTPATIENT
Start: 2020-01-01 | End: 2020-01-01

## 2020-01-01 RX ORDER — ASPIRIN 325 MG
325 TABLET ORAL DAILY
Status: DISCONTINUED | OUTPATIENT
Start: 2020-01-01 | End: 2020-01-01

## 2020-01-01 RX ORDER — ONDANSETRON 4 MG/1
4 TABLET, ORALLY DISINTEGRATING ORAL DAILY
COMMUNITY
End: 2020-01-01

## 2020-01-01 RX ORDER — ALBUMIN, HUMAN INJ 5% 5 %
250 SOLUTION INTRAVENOUS ONCE
Status: COMPLETED | OUTPATIENT
Start: 2020-01-01 | End: 2020-01-01

## 2020-01-01 RX ORDER — MORPHINE SULFATE 2 MG/ML
INJECTION, SOLUTION INTRAMUSCULAR; INTRAVENOUS
Status: COMPLETED
Start: 2020-01-01 | End: 2020-01-01

## 2020-01-01 RX ORDER — KETOROLAC TROMETHAMINE 30 MG/ML
15 INJECTION, SOLUTION INTRAMUSCULAR; INTRAVENOUS EVERY 6 HOURS PRN
Status: DISPENSED | OUTPATIENT
Start: 2020-01-01 | End: 2020-01-01

## 2020-01-01 RX ORDER — MIDODRINE HYDROCHLORIDE 10 MG/1
10 TABLET ORAL 3 TIMES DAILY
Status: DISCONTINUED | OUTPATIENT
Start: 2020-01-01 | End: 2020-01-01

## 2020-01-01 RX ORDER — ACETAMINOPHEN 325 MG/1
650 TABLET ORAL EVERY 12 HOURS PRN
Status: DISCONTINUED | OUTPATIENT
Start: 2020-01-01 | End: 2020-01-01

## 2020-01-01 RX ORDER — PREDNISONE 10 MG/1
10 TABLET ORAL
Qty: 1 TABLET | Refills: 0 | Status: SHIPPED | OUTPATIENT
Start: 2020-01-01 | End: 2020-01-01

## 2020-01-01 RX ORDER — ENOXAPARIN SODIUM 100 MG/ML
40 INJECTION SUBCUTANEOUS NIGHTLY
Status: DISCONTINUED | OUTPATIENT
Start: 2020-01-01 | End: 2020-01-01

## 2020-01-01 RX ORDER — ONDANSETRON 2 MG/ML
4 INJECTION INTRAMUSCULAR; INTRAVENOUS EVERY 6 HOURS
Status: DISCONTINUED | OUTPATIENT
Start: 2020-01-01 | End: 2020-01-01

## 2020-01-01 RX ORDER — CEFAZOLIN SODIUM 1 G/3ML
INJECTION, POWDER, FOR SOLUTION INTRAMUSCULAR; INTRAVENOUS AS NEEDED
Status: DISCONTINUED | OUTPATIENT
Start: 2020-01-01 | End: 2020-01-01 | Stop reason: SURG

## 2020-01-01 RX ORDER — CETIRIZINE HYDROCHLORIDE 10 MG/1
10 TABLET ORAL 2 TIMES DAILY WITH MEALS
Status: DISCONTINUED | OUTPATIENT
Start: 2020-01-01 | End: 2020-01-01

## 2020-01-01 RX ORDER — ACETAMINOPHEN 325 MG/1
650 TABLET ORAL 3 TIMES DAILY
Qty: 180 TABLET | Refills: 0 | Status: SHIPPED | OUTPATIENT
Start: 2020-01-01 | End: 2020-01-01

## 2020-01-01 RX ORDER — TROLAMINE SALICYLATE 10 G/100G
1 CREAM TOPICAL 2 TIMES DAILY
Status: DISCONTINUED | OUTPATIENT
Start: 2020-01-01 | End: 2020-01-01 | Stop reason: SDUPTHER

## 2020-01-01 RX ORDER — LORAZEPAM 2 MG/ML
2 INJECTION INTRAMUSCULAR
Status: DISCONTINUED | OUTPATIENT
Start: 2020-01-01 | End: 2020-01-01

## 2020-01-01 RX ORDER — ONDANSETRON 2 MG/ML
4 INJECTION INTRAMUSCULAR; INTRAVENOUS EVERY 6 HOURS PRN
Status: DISCONTINUED | OUTPATIENT
Start: 2020-01-01 | End: 2020-01-01 | Stop reason: DRUGHIGH

## 2020-01-01 RX ORDER — ATORVASTATIN CALCIUM 80 MG/1
80 TABLET, FILM COATED ORAL NIGHTLY
Status: DISCONTINUED | OUTPATIENT
Start: 2020-01-01 | End: 2020-01-01

## 2020-01-01 RX ORDER — MORPHINE SULFATE 4 MG/ML
1 INJECTION, SOLUTION INTRAMUSCULAR; INTRAVENOUS EVERY 2 HOUR PRN
Status: DISCONTINUED | OUTPATIENT
Start: 2020-01-01 | End: 2020-01-01

## 2020-01-01 RX ORDER — TRAMADOL HYDROCHLORIDE 50 MG/1
50 TABLET ORAL EVERY 6 HOURS PRN
Status: DISCONTINUED | OUTPATIENT
Start: 2020-01-01 | End: 2020-01-01

## 2020-01-01 RX ORDER — POTASSIUM CHLORIDE 1.5 G/1.77G
40 POWDER, FOR SOLUTION ORAL EVERY 4 HOURS
Status: ACTIVE | OUTPATIENT
Start: 2020-01-01 | End: 2020-01-01

## 2020-01-01 RX ORDER — HYDRALAZINE HYDROCHLORIDE 20 MG/ML
5 INJECTION INTRAMUSCULAR; INTRAVENOUS ONCE
Status: COMPLETED | OUTPATIENT
Start: 2020-01-01 | End: 2020-01-01

## 2020-01-01 RX ORDER — LABETALOL 100 MG/1
50 TABLET, FILM COATED ORAL 2 TIMES DAILY
Status: DISCONTINUED | OUTPATIENT
Start: 2020-01-01 | End: 2020-01-01

## 2020-01-01 RX ORDER — ENOXAPARIN SODIUM 100 MG/ML
1 INJECTION SUBCUTANEOUS EVERY 12 HOURS SCHEDULED
Qty: 49.8 ML | Refills: 0 | Status: SHIPPED | OUTPATIENT
Start: 2020-01-01 | End: 2020-01-01

## 2020-01-01 RX ORDER — ACETAMINOPHEN 325 MG/1
650 TABLET ORAL ONCE
Status: DISCONTINUED | OUTPATIENT
Start: 2020-01-01 | End: 2020-01-01 | Stop reason: ALTCHOICE

## 2020-01-01 RX ORDER — ROSUVASTATIN CALCIUM 20 MG/1
TABLET, COATED ORAL
Qty: 90 TABLET | Refills: 0 | OUTPATIENT
Start: 2020-01-01

## 2020-01-01 RX ORDER — PREDNISONE 10 MG/1
10 TABLET ORAL
Status: DISCONTINUED | OUTPATIENT
Start: 2020-01-01 | End: 2020-01-01

## 2020-01-01 RX ORDER — HYDRALAZINE HYDROCHLORIDE 20 MG/ML
10 INJECTION INTRAMUSCULAR; INTRAVENOUS EVERY 6 HOURS PRN
Status: DISCONTINUED | OUTPATIENT
Start: 2020-01-01 | End: 2020-01-01

## 2020-01-01 RX ORDER — LORAZEPAM 2 MG/ML
0.5 INJECTION INTRAMUSCULAR EVERY 6 HOURS
Status: DISCONTINUED | OUTPATIENT
Start: 2020-01-01 | End: 2020-01-01

## 2020-01-01 RX ORDER — GABAPENTIN 300 MG/1
300 CAPSULE ORAL 3 TIMES DAILY
Status: DISCONTINUED | OUTPATIENT
Start: 2020-01-01 | End: 2020-01-01

## 2020-01-01 RX ORDER — GLYCOPYRROLATE 0.2 MG/ML
INJECTION, SOLUTION INTRAMUSCULAR; INTRAVENOUS AS NEEDED
Status: DISCONTINUED | OUTPATIENT
Start: 2020-01-01 | End: 2020-01-01 | Stop reason: SURG

## 2020-01-01 RX ORDER — HYDROMORPHONE HYDROCHLORIDE 1 MG/ML
0.2 INJECTION, SOLUTION INTRAMUSCULAR; INTRAVENOUS; SUBCUTANEOUS EVERY 2 HOUR PRN
Status: DISCONTINUED | OUTPATIENT
Start: 2020-01-01 | End: 2020-01-01

## 2020-01-01 RX ORDER — TRAMADOL HYDROCHLORIDE 50 MG/1
50 TABLET ORAL 3 TIMES DAILY
Qty: 30 TABLET | Refills: 0 | Status: SHIPPED | OUTPATIENT
Start: 2020-01-01

## 2020-01-01 RX ORDER — MIDODRINE HYDROCHLORIDE 5 MG/1
5 TABLET ORAL 3 TIMES DAILY
Status: DISCONTINUED | OUTPATIENT
Start: 2020-01-01 | End: 2020-01-01

## 2020-01-01 RX ADMIN — CEFAZOLIN SODIUM 2 G: 1 INJECTION, POWDER, FOR SOLUTION INTRAMUSCULAR; INTRAVENOUS at 10:54:00

## 2020-01-01 RX ADMIN — LIDOCAINE HYDROCHLORIDE 9 ML: 10 INJECTION, SOLUTION INFILTRATION; PERINEURAL at 13:26:00

## 2020-01-01 RX ADMIN — SODIUM CHLORIDE, SODIUM LACTATE, POTASSIUM CHLORIDE, CALCIUM CHLORIDE: 600; 310; 30; 20 INJECTION, SOLUTION INTRAVENOUS at 10:33:00

## 2020-01-01 RX ADMIN — SODIUM CHLORIDE: 9 INJECTION, SOLUTION INTRAVENOUS at 14:36:00

## 2020-01-01 RX ADMIN — MIDAZOLAM HYDROCHLORIDE 1 MG: 1 INJECTION INTRAMUSCULAR; INTRAVENOUS at 13:48:00

## 2020-01-01 RX ADMIN — NEOSTIGMINE METHYLSULFATE 2 MG: 1 INJECTION INTRAVENOUS at 12:27:00

## 2020-01-01 RX ADMIN — PHENYLEPHRINE HCL 40 MCG: 10 MG/ML VIAL (ML) INJECTION at 13:57:00

## 2020-01-01 RX ADMIN — MIDAZOLAM HYDROCHLORIDE 1 MG: 1 INJECTION INTRAMUSCULAR; INTRAVENOUS at 13:41:00

## 2020-01-01 RX ADMIN — HYDRALAZINE HYDROCHLORIDE 5 MG: 20 INJECTION INTRAMUSCULAR; INTRAVENOUS at 11:15:00

## 2020-01-01 RX ADMIN — AMINOPHYLLINE DIHYDRATE 125 MG: 25 INJECTION, SOLUTION INTRAVENOUS at 15:00:00

## 2020-01-01 RX ADMIN — ROCURONIUM BROMIDE 30 MG: 10 INJECTION, SOLUTION INTRAVENOUS at 10:41:00

## 2020-01-01 RX ADMIN — SODIUM CHLORIDE: 9 INJECTION, SOLUTION INTRAVENOUS at 13:18:00

## 2020-01-01 RX ADMIN — HYDRALAZINE HYDROCHLORIDE 5 MG: 20 INJECTION INTRAMUSCULAR; INTRAVENOUS at 11:28:00

## 2020-01-01 RX ADMIN — ONDANSETRON 4 MG: 2 INJECTION INTRAMUSCULAR; INTRAVENOUS at 12:33:00

## 2020-01-01 RX ADMIN — GLYCOPYRROLATE 0.4 MG: 0.2 INJECTION, SOLUTION INTRAMUSCULAR; INTRAVENOUS at 12:27:00

## 2020-01-01 RX ADMIN — LIDOCAINE HYDROCHLORIDE 25 MG: 10 INJECTION, SOLUTION EPIDURAL; INFILTRATION; INTRACAUDAL; PERINEURAL at 12:23:00

## 2020-01-01 RX ADMIN — MIDAZOLAM HYDROCHLORIDE 2 MG: 1 INJECTION INTRAMUSCULAR; INTRAVENOUS at 10:35:00

## 2020-01-01 RX ADMIN — LIDOCAINE HYDROCHLORIDE 50 MG: 10 INJECTION, SOLUTION EPIDURAL; INFILTRATION; INTRACAUDAL; PERINEURAL at 10:40:00

## 2020-01-01 RX ADMIN — DEXAMETHASONE SODIUM PHOSPHATE 4 MG: 4 MG/ML VIAL (ML) INJECTION at 11:10:00

## 2020-01-01 SDOH — HEALTH STABILITY: PHYSICAL HEALTH: ON AVERAGE, HOW MANY DAYS PER WEEK DO YOU ENGAGE IN MODERATE TO STRENUOUS EXERCISE (LIKE A BRISK WALK)?: 0 DAYS

## 2020-01-01 SDOH — HEALTH STABILITY: PHYSICAL HEALTH: ON AVERAGE, HOW MANY MINUTES DO YOU ENGAGE IN EXERCISE AT THIS LEVEL?: 0 MIN

## 2020-01-01 ASSESSMENT — ENCOUNTER SYMPTOMS
RESPIRATORY NEGATIVE: 1
CONSTITUTIONAL NEGATIVE: 1
EYES NEGATIVE: 1
GASTROINTESTINAL NEGATIVE: 1
SYNCOPE: 0
NEUROLOGICAL NEGATIVE: 1
ENDOCRINE NEGATIVE: 1
NEAR-SYNCOPE: 0
BLOATING: 0
SHORTNESS OF BREATH: 0
PSYCHIATRIC NEGATIVE: 1

## 2020-01-14 PROBLEM — Z01.810 PREOPERATIVE CARDIOVASCULAR EXAMINATION: Status: ACTIVE | Noted: 2020-01-01

## 2020-01-14 NOTE — TELEPHONE ENCOUNTER
Spoke to patient. She states that previously Dr. Terra Gutierrez mentioned doing injections for her neck pain. She is wondering if she can come in sooner than next available for injections. Please advise.

## 2020-01-21 NOTE — TELEPHONE ENCOUNTER
From: Margarito Pang  Sent: 1/21/2020 4:05 PM CST  To: Denver Castillo Nurse  Subject: Serjio Willett,   Thank you for calling. No side effects. Left arm is a little better- looser- as are my fingers. Left leg: harder to pinpoint any changes.  Trish

## 2020-01-21 NOTE — TELEPHONE ENCOUNTER
From: Bettyjane Schirmer  To: Chelsey Hernadez MD  Sent: 1/21/2020 4:10 PM CST  Subject: Other    Hi again. This only allows shorter message than I had.    How soon might you be able to squeeze me in for neck injections to reduce/stop pain and reduced range of

## 2020-01-22 NOTE — PAT NURSING NOTE
Left message with Miriam in Dr Darrel Lorenzo office regarding patient being admitted to hospital on 2/3 prior to 2/4 surgery.  Request call back

## 2020-01-22 NOTE — TELEPHONE ENCOUNTER
As per my chart discussion, will give additional units to left lower extremity of Botox at next set of injections. Additionally, we will set up for trigger point injections to neck next week.

## 2020-01-29 NOTE — PAT NURSING NOTE
Reviewed notes and appears patient will be admitted day before surgery. Called Dr Sade Campbell office and s/w Amanda Rios who did confirm 2/3 admit.  PAT case closed

## 2020-02-01 NOTE — PROCEDURES
Procedure: Trigger point injections    Indication: bilateral neck pain    Consent: Informed consent was obtained from patient.  Patient was explained the risks, benefits and alternatives of procedure, risks including but not limited to bleeding, infection,

## 2020-02-03 PROBLEM — R19.00 PELVIC MASS: Status: ACTIVE | Noted: 2020-01-01

## 2020-02-03 NOTE — H&P
[facility]    PATIENT'S NAME: Julee [facility]   ATTENDING PHYSICIAN: Donovan Moore M.D.    PATIENT ACCOUNT#:   [de-identified]    LOCATION:    MEDICAL RECORD #:   [id number]       YOB: 1945  ADMISSION DATE:       02/04/2020    HISTORY AND [address] [id number]  /

## 2020-02-03 NOTE — ANESTHESIA PREPROCEDURE EVALUATION
PRE-OP EVALUATION    Patient Name: Nemo Manriquez    Pre-op Diagnosis: PELVIC MASS    Procedure(s):  ROBOTIC ASSISTED LAPAROSCOPIC TOTAL HYSTERECTOMY, BILATERAL SALPINGO OOPHORECTOMY, FROZEN SECTION,POSSIBLE LAPAROTOMY    Surgeon(s) and Role:     Philip Liriano, topically as needed.  Left scapula  ), Disp: 100 g, Rfl: 1  Pantoprazole Sodium 20 MG Oral Tab EC, TAKE 1 TABLET(20 MG) BY MOUTH EVERY MORNING BEFORE BREAKFAST, Disp: 90 tablet, Rfl: 0  magnesium oxide 400 MG Oral Tab, Take 1 tablet (400 mg total) by mouth (+) liver disease                 Cardiovascular                  (+) hypertension           (+) pacemaker/AICD       (+) dysrhythmias                   Endo/Other      (+) diabetes                            Pulmonary                           Neuro/Psy Date     01/08/2020    K 4.00 01/08/2020     01/08/2020    CO2 27.7 01/08/2020    BUN 21.0 (H) 01/08/2020    CREATSERUM 1.08 (H) 01/08/2020    GLU 94 01/08/2020            Airway      Mallampati: III       Cardiovascular             Dental

## 2020-02-03 NOTE — PLAN OF CARE
NURSING ADMISSION NOTE      Patient admitted via wheelchair as direct admit  Oriented to room. Safety precautions initiated. Bed in low position. Call light in reach. Updated history and pta meds. Gave report to NAVIN BOGGS.

## 2020-02-04 PROBLEM — Z86.73 HISTORY OF STROKE: Status: ACTIVE | Noted: 2020-01-01

## 2020-02-04 PROBLEM — I25.10 CAD IN NATIVE ARTERY: Status: ACTIVE | Noted: 2020-01-01

## 2020-02-04 PROBLEM — I21.11 ACUTE ST ELEVATION MYOCARDIAL INFARCTION (STEMI) INVOLVING RIGHT CORONARY ARTERY (HCC): Status: ACTIVE | Noted: 2020-01-01

## 2020-02-04 PROBLEM — I63.9 CEREBROVASCULAR ACCIDENT (HCC): Status: ACTIVE | Noted: 2018-03-11

## 2020-02-04 PROBLEM — E78.00 PURE HYPERCHOLESTEROLEMIA: Chronic | Status: ACTIVE | Noted: 2020-01-01

## 2020-02-04 NOTE — OPERATIVE REPORT
[facility]    PATIENT'S NAME: Amanda Tyler   ATTENDING PHYSICIAN: Afia Vaughn M.D. OPERATING PHYSICIAN: Afia Vaughn M.D.    PATIENT ACCOUNT#:   [de-identified]    LOCATION:  PACU [facility] PACU 5 EDWP 10  MEDICAL RECORD #:   [id number]       DATE OF the ovarian ligament was isolated, coagulated, and cut. The specimen was freed and left in the upper abdomen. Then, dissection was performed on the left side by opening the peritoneum lateral to the infundibulopelvic ligament.   The retroperitoneal space

## 2020-02-04 NOTE — PROGRESS NOTES
Patient sent to OR per bed. Patient wants to speak with Dr. Tash Albert before signing consent. Patient remains NPO.

## 2020-02-04 NOTE — PACU NOTE
Order for Neuro consult was obtained by Dr. Shelby Oconnor. MD was paged. Callback from Dr. Roxi Meade was received by this RN, update given. Advised that patient was currently in CT.

## 2020-02-04 NOTE — PROGRESS NOTES
[facility]  Brief Op Note    Minus Travis Location: OR   [facility] [id number] MRN [id number]   Admission Date 2/3/2020 Operation Date 2/4/2020   Attending Physician Jason Renteria MD Operating Physician Ashwin Ham MD     Pre-Operative Diagnosis:

## 2020-02-04 NOTE — OCCUPATIONAL THERAPY NOTE
OT ADL screening orders received per [facility] protocol. Patient is currently in surgery. Will need new orders to eval and treat once medically appropriate.

## 2020-02-04 NOTE — PLAN OF CARE
Received pt from PACU s/p RRT/ code stroke. At 1530. Pt alert. Expressive aphasia. Able to nod head appropriately. Pupils equal and reactive. Baseline left sided weakness. See flowsheet for full documentation. Keep sbp 140-180. Aman gtt.  Full dose asa given

## 2020-02-04 NOTE — PACU NOTE
Patient arrived in PACU at 1242, VSS. Not responding to voice or stimulation at first. Slow to wake. From [id number] patient responding more. Turning head to voice. Patient had loose BM, RN cleaned and changed bedding with assistance.  Patient not respondi

## 2020-02-04 NOTE — PROGRESS NOTES
[facility]  Interventional Neuroradiology Progress Note    Gus Cardenas Patient Status:  Inpatient    1945 MRN [id number]   Location [facility] Attending Micah Blackburn MD   Hosp Day # 0 PCP David Urbina MD

## 2020-02-04 NOTE — ANESTHESIA PROCEDURE NOTES
Airway  Date/Time: 2/4/2020 10:43 AM  Urgency: elective      General Information and Staff    Patient location during procedure: OR  Anesthesiologist: Laura Robledo MD  Performed: anesthesiologist     Indications and Patient Condition  Indications

## 2020-02-04 NOTE — PLAN OF CARE
Patient alert and oriented  Left side flaccid from previous stroke  NPO at this time for surgery  Enema given as ordered with positive results  VSS, lungs clear, will continue to monitor.     Problem: PAIN - ADULT  Goal: Verbalizes/displays adequate comfort resources  Description  INTERVENTIONS:  - Identify barriers to discharge w/pt and caregiver  - Include patient/family/discharge partner in discharge planning  - Arrange for needed discharge resources and transportation as appropriate  - Identify discharge

## 2020-02-04 NOTE — PROGRESS NOTES
RRT called while patient in PACU. S/p successful robotic-assisted hysterectomy, b/l salpingo-oophorectomy with frozen section. Expectedly drowsy post-op with some slowness to respond.  Had BM, was cleaned and subsequently became lethargic, unable to verbali

## 2020-02-04 NOTE — PROCEDURES
[facility]    [facility] Cardiac Cath Procedure Note  Jocy Rangel Patient Status:  Inpatient    1945 MRN [id number]   Location [facility] [address] [facility] Attending Petra Wyatt MD   Hosp Day # 0 PCP Evonne Fonseca MD       Cardi the PL which then demonstrated occlusion in the mid vessel and was then ballooned with a 2.0 x 12 mm balloon.   120 mg of adenosine was given intracoronary and angiographic images demonstrated decent flow down the vessel  -Final angiographic views were obta and myself during the exam for a total of 70 minutes. Rhett Mtz MD  02/04/20

## 2020-02-04 NOTE — CONSULTS
[facility] Cardiology  Consult    Valeria Leung Patient Status:  Inpatient    1945 MRN [id number]   Location [address] [facility] Attending Ruby Hill MD   Hosp Day # 0 PCP Quita Sanchez MD     Indication for consult:  Ilean Masker Problems:    Cerebrovascular accident [facility])    Type 2 diabetes mellitus with complication, without long-term current use of insulin (HCC)    Left hemiparesis ([address]    Pelvic mass    History of stroke    Acute ST elevation myocardial infarction (STEMI) invo

## 2020-02-04 NOTE — CONSULTS
General Medicine Consult      Reason for consult: claudia-operative medical mgmt    Consulted by: Dr. Jamaica Bernal    PCP: Wm Truong MD    History of Present Illness:   Pt is a 69year old F w/ PMHx CVA w/ resultant L sided hemiparesis, HTN, HLD, DMII.  She was 50,000 Units by mouth once a week. Takes on Friday weekly ), Disp: 12 capsule, Rfl: 0  Labetalol HCl 100 MG Oral Tab, TAKE 2 TABLETS(200 MG) BY MOUTH EVERY 8 HOURS (Patient taking differently: 2 (two) times daily.  TAKE 2 TABLETS(200 MG) BY MOUTH EVERY 8 HO Smoking status: Never Smoker      Smokeless tobacco: Never Used    Alcohol use: Yes      Frequency: Monthly or less      Drinks per session: 1 or 2      Binge frequency: Never      Comment: social       Fam Hx  History reviewed.  No pertinent family histo Resume when ok w/ OB/GYN.  I d/w possibility of taking ASA this AM if ok w/ OB-GYN, however pt states she will decline d/t c/f bleeding  - no new neuro deficits    # DMII  - hold PO meds  - SQ SSI, accuchecks    # HTN  - home meds - labetalol, amlodipine, l

## 2020-02-04 NOTE — ANESTHESIA POSTPROCEDURE EVALUATION
[address] [facility] Patient Status:  Inpatient   Age/Gender 69year old female MRN [id number]   [facility] SURGERY Attending Wicho Borges MD   [facility] Day # 0 PCP Maria C Will MD       Anesthesia Post-op Note    Procedure(s):

## 2020-02-04 NOTE — HISTORICAL OFFICE NOTE
[facility]  Clinic Note    Mena Mcneil  : 1945  PCP: Emilie Yates MD    Reason for Visit:    Chief Complaint   Patient presents with   • Hypertension   • Other   CVA   • Pacemaker history   Linq monitor (Medtronic) 3/2018   • Other surgical history   Linq monitor removal 4/2018     Family Hx:    Family History   Problem Relation Age of Onset   • Aneurysm Other   Significant for AAA.    • Coronary Artery Disease Neg Hx   Negative for auscultation  Cardiac: RRR   Abdomen: Soft, non tender, difficult to assess abd aorta  Musculoskeletal: ambulatory  Extremities: pulses intact  Skin: Warm  Neuro: A & O  Psychiatric: Normal affect.     Labs:  No results found for: CHOLESTEROL  No results fo

## 2020-02-05 NOTE — DIETARY NOTE
Nutrition Short Note    Dietitian consult received per cardiac rehab protocol. Pt to be educated by cardiac rehab staff and encouraged to attend outpatient classes taught by RD. MUSHTAQ available PRN.     Luisa Troncoso RD, LDN

## 2020-02-05 NOTE — SLP NOTE
ADULT SWALLOWING EVALUATION    ASSESSMENT    ASSESSMENT/OVERALL IMPRESSION:  Patient admitted for a hysterectomy. When she awoke in the PACU she presented with an aphasia. Patient presented with thin liquids via teaspoon, puree and solids.  She refused li coronary artery ([facility]    CAD in native artery    Pure hypercholesterolemia      Past Medical History  Past Medical History:   Diagnosis Date   • Arrhythmia    • Back problem    • Cataract    • Complete heart block ([facility]     [place]--Dr. Haas Valentina   • Diabetes rule-out silent aspiration.)    Esophageal Phase of Swallow: No complaints consistent with possible esophageal involvement              GOALS  Goal #1 The patient will tolerate puree consistency and thin liquids without overt signs or symptoms of aspiratio

## 2020-02-05 NOTE — PROGRESS NOTES
2000- received pt alert, pt using alphabet board and nods appropriately for communication other wise her speech is incomprehensible. See neuro flowsheet for other details. bp elevated but within ordered range. IABP intact, right groin site CDI.  Pt is c/o c

## 2020-02-05 NOTE — PROGRESS NOTES
[facility]  Progress Note    Kev Fritz Patient Status:  Inpatient    1945 MRN [id number]   [facility] 6NE-A Attending Erin Alexandra MD   [facility] Day # 1 PCP Debra Borges MD       SUBJECTIVE:  Comfortable  Minimal pain    O (BENADRYL) injection 12.5 mg, 12.5 mg, Intravenous, Q4H PRN  Enoxaparin Sodium (LOVENOX) 40 MG/0.4ML injection 40 mg, 40 mg, Subcutaneous, Nightly  ketorolac tromethamine (TORADOL) 30 MG/ML injection 15 mg, 15 mg, Intravenous, Q6H PRN  acetaminophen (TYLEN (PEPCID) injection 20 mg, 20 mg, Intravenous, Daily  Rosuvastatin Calcium (CRESTOR) tab 20 mg, 20 mg, Oral, Nightly  aspirin EC tab 81 mg, 81 mg, Oral, Daily  cangrelor tetrasodium (KENGREAL) 50 mg in sodium chloride 0.9% 250 mL IVPB for BRIDGING, 0.75 mcg

## 2020-02-05 NOTE — PHYSICAL THERAPY NOTE
Physical Therapy    Orders received per stroke protocol. Pt remains on bedrest at this time, has MRI pending. Will assess pt once activity orders received and bedrest is lifted.

## 2020-02-05 NOTE — PLAN OF CARE
Problem: Impaired Swallowing  Goal: Minimize aspiration risk  Description  Interventions:  - Patient should be alert and upright for all feedings (90 degrees preferred)  - Offer food and liquids at a slow rate  - No straws  - Encourage small bites of chaz

## 2020-02-05 NOTE — PROGRESS NOTES
Seen and examined. Reviewed with Marcos Anne and Isabel Ashford. Films reviewed and recent history. Alert this am yet still with fairly pronounced expressive aphasia. Uncomfortable laying flat and intermittent nausea.  Patient's daughter states that spee

## 2020-02-05 NOTE — PROGRESS NOTES
[facility] Hospitalist Progress Note     Velasquez Urena Patient Status:  Inpatient    1945 MRN [id number]   [facility] 6NE-A Attending Humberto De La Paz MD   Hosp Day # 1 PCP Gabby Rowe MD     CC: follow up    SUBJECTIVE:  +expressive a Dose information is transmitted to the  [facility] of Radiology) NRDR ([address]) which includes the Dose Index Registry.   PATIENT STATED HISTORY:(As transcribed by Technologist)  patient was in pacu for hysterectomy and be 3/11/2018. 6. Large vessel occlusion is not identified in the major arterial structures in the head. 7. Multiple segments with moderate to severe stenosis throughout the right anterior cerebral artery A2 branch has increased since prior examination.   Zachery Nails ketorolac (TORADOL) injection, acetaminophen **OR** HYDROcodone-acetaminophen **OR** HYDROcodone-acetaminophen, morphINE sulfate **OR** morphINE sulfate **OR** morphINE sulfate, acetaminophen **OR** acetaminophen, Labetalol HCl, NiCARdipine, phenylephrine,    Time spent: greater than 35 minutes spent in d/w pt/family, coordination of care, and/or d/w staff.   Hao Xiao MD  [facility] IM Hospitalist  Pager: [phone]

## 2020-02-05 NOTE — PROGRESS NOTES
Pt is 70 yo F scheduled for elective gynecologic case. She was given clearance by cardiology and neurology. Post op events can not be directly related to anesthesia. No hypoxia, no hypotension, and no tachycardia during surgery.   BP maintained thro

## 2020-02-05 NOTE — PROGRESS NOTES
[facility]  Neurocritical Care       Subjective: Keisha Hill is a(n) 69year old female still has exp aphasia, MI on Cangrelor.     Review of Systems    Constitutional:    Denies unusual weight loss or weight gain, fever/chills or nig reduction techniques were used. Dose information is transmitted to the [facility]) NRDR ([address]) which includes the Dose Index Registry.   PATIENT STATED HISTORY:(As transcribed by Technologist)  patient was includes the Dose Index Registry. PATIENT STATED HISTORY:(As transcribed by Technologist)    CONTRAST USED:  FINDINGS:  There is severe stenosis in the mid cavernous segment of the left internal carotid artery.   The petrous segment is mildly hypoplastic a exam (image 340). Takeoff of bilateral posterior inferior cerebellar arteries is unremarkable. There is a 3-vessel aortic arch. The origins of the branch vessels appear widely patent.   The bilateral subclavian arteries and innominate artery are unremark involving bilateral M 2 branches is relatively stable. 9. Moderate stenosis in the mid basilar artery along with the distal left V4 segment having severe stenosis is new when compared to prior examination and likely due to atherosclerotic disease.   This c Q12H PRN    Or  prochlorperazine (COMPAZINE) rectal suppository 25 mg, 25 mg, Rectal, Q12H PRN  diphenhydrAMINE HCl (BENADRYL) injection 12.5 mg, 12.5 mg, Intravenous, Q4H PRN  Enoxaparin Sodium (LOVENOX) 40 MG/0.4ML injection 40 mg, 40 mg, Subcutaneous, N (REGLAN) injection 10 mg, 10 mg, Intravenous, Q8H PRN  famoTIDine (PEPCID) tab 20 mg, 20 mg, Oral, Daily    Or  famoTIDine (PEPCID) injection 20 mg, 20 mg, Intravenous, Daily  Rosuvastatin Calcium (CRESTOR) tab 20 mg, 20 mg, Oral, Nightly  aspirin EC tab 8 100 ideally near 70 and HDL > or around 40.  4. B-vitamins and MVI  5. Hold Anti-HTN,meds Keep -180  for permissive HTN. 5. Echocardiogram (TTE), CTA Head and Neck as above, LICA severe stenosis. 6. MRI Brain. 7. IV Fluids 0.9 normal saline.   8. D

## 2020-02-05 NOTE — SLP NOTE
Attempted to see patient for bedside swallowing evaluation. Patient refused swallowing evaluation at this time. Will attempt later on this date.     Jarvis Duenas MA, Stanley Soriano  Speech Language Pathologist  Pager# [phone]

## 2020-02-06 NOTE — CM/SW NOTE
02/06/20 1100   CM/SW Referral Data   Referral Source Physician   Reason for Referral Discharge planning   Social History   Recreational Drug/Alcohol Use no   Major Changes Last 6 Months no   Domestic/Partner Violence no   Suicidal Ideation/Depression/M

## 2020-02-06 NOTE — PROGRESS NOTES
[facility] Hospitalist Progress Note     Kev Fritz Patient Status:  Inpatient    1945 MRN [id number]   [facility] 6NE-A Attending Erin Alexandra MD   [facility] Day # 2 PCP Debra Borges MD     CC: follow up    SUBJECTIVE:  Pt working wi scattered small areas of restricted diffusion noted in the left frontal lobe, left parietal lobe, left occipital lobe compatible with areas of acute ischemia/infarction. The largest area measures up to 2.0 x 1.6 cm along the left occipital lobe.   Critical phenylephrine, PEG 3350, magnesium hydroxide, bisacodyl, Fleet Enema, [DISCONTINUED] ondansetron HCl **OR** Metoclopramide HCl, Saline Nasal Spray, glucose **OR** Glucose-Vitamin C **OR** dextrose **OR** glucose **OR** Glucose-Vitamin C        Assessment/P

## 2020-02-06 NOTE — PHYSICAL THERAPY NOTE
PHYSICAL THERAPY EVALUATION - INPATIENT     Room Number: 7820/3209-Z  Evaluation Date: 2/6/2020  Type of Evaluation: Initial  Physician Order: PT Eval and Treat    Presenting Problem: pelvic mass s/p total hysterectomy on 2/4 post-op STEMI  Reason fo Pacemaker   • XI ROBOT-ASSISTED LAPAROSCOPIC HYSTERECTOMY Bilateral 2/4/2020    Performed by Humberto De La Paz MD at [address] [place] [address]  Type of Home: House   Home Layout: One level                Lives With: Alone;Caregiver part-time  Drives Method: Automatic  Patient Position: Sitting    O2 WALK                  AM-PAC '6-Clicks' INPATIENT SHORT FORM - BASIC MOBILITY  How much difficulty does the patient currently have. ..  -   Turning over in bed (including adjusting bedclothes, sheets and bl Exercise/Education Provided:  Bed mobility  Functional activity tolerated  Posture  Strengthening  Transfer training    Patient End of Session: In bed;Needs met;Call light within reach;RN aware of session/findings; All patient questions and concerns add

## 2020-02-06 NOTE — PROGRESS NOTES
Seen and examined earlier today. Reviewed with nursing staff at bedside and discussed in detail with patient's daughters. Appears exhausted and pale.  Able to formulate words better - family feels right arm weaker - breathing also off intermittently over

## 2020-02-06 NOTE — PLAN OF CARE
Received patient at 0730 awake and alert. Following commands. See neuro exam flowsheet. Oriented x3. Titrating lexi to keep sbp 140-180's. Cangrelor at 0.75 mcg/kg/min. IABP right groin soft nontender pulses per doppler.   Dr. Shellie Modi at bedside orders Offer pills one at a time, crush or deliver with applesauce as needed  - Discontinue feeding and notify MD (or speech pathologist) if coughing or persistent throat clearing or wet/gurgly vocal quality is noted  2/5/2020 1904 by Duyen Perry RN  Outcome:

## 2020-02-06 NOTE — PLAN OF CARE
Received patient at 0730 awake following simple commands. Cangrelor at 0.75 mg/kg/min. Neosynephrine titrating to keep patient asymptomatic. Patient at 1300 state numbness on right side arm and leg.   Dr. Onesimo Mcneal at bedside discussed with patient and felicia hypotension and signs of decreased cardiac output  - Evaluate effectiveness of vasoactive medications to optimize hemodynamic stability  - Monitor arterial and/or venous puncture sites for bleeding and/or hematoma  - Assess quality of pulses, skin color an

## 2020-02-06 NOTE — PHYSICAL THERAPY NOTE
Per stroke protocol and stroke committee recommendation, patient is on bedrest for 24 hrs from ADT time of 2/4 on 1600. PT will evaluate the patient once activity level is upgraded.

## 2020-02-06 NOTE — PROGRESS NOTES
[address]  Interventional Neuro Radiology Consult    Abhay Payment Patient Status:  Inpatient    1945 MRN [id number]   [facility] 6NE-A Attending Max Colon MD   [facility] Day # 2 PCP Elvin Peter MD     15-A [address] occlusion. She was brought emergently to the cath lab. IABP was placed but determine to not be candidate for surgical intervention based on co-morbidities. Cangrelor gtt was initiated. Per chart review speech noted to be worse when pressure is lower. COMMENTS)    Comment:weakness  Lipitor [Atorvastat*    INSOMNIA    MEDICATIONS:  Pediatric Multivit-Minerals-C (SMARTY PANTS KIDS COMPLETE OR), Take by mouth daily. , Disp: , Rfl: , Past Month at Unknown time  Diclofenac Sodium 1 % Transdermal Gel, Apply 2 Take 100 mg by mouth 2 (two) times daily. , Disp: 60 capsule, Rfl: 0, 2/2/2020 at 2100  PEG 3350 Oral Powd Pack, Take 17 g by mouth daily as needed. , Disp: 1 g, Rfl: 0, Taking  gabapentin 100 MG Oral Cap, 3 capsules twice daily x 1 week, then 3 capsules 3 t Application, Topical, BID  magnesium oxide (MAG-OX) tab 400 mg, 400 mg, Oral, TID  lactated ringers infusion, , Intravenous, Continuous  dextrose 5% infusion, , Intravenous, Continuous  acetaminophen (TYLENOL) tab 650 mg, 650 mg, Oral, Q4H PRN    Or  aceta PRN    Or  Glucose-Vitamin C (DEX-4) chewable tab 8 tablet, 8 tablet, Oral, Q15 Min PRN  Labetalol HCl (NORMODYNE) tab 200 mg, 200 mg, Oral, BID  gabapentin (NEURONTIN) cap 400 mg, 400 mg, Oral, Nightly  Losartan Potassium (COZAAR) tab 75 mg, 75 mg, Oral, arterial structures in the neck. 2. The severe narrowing in the cavernous segment of the left internal carotid artery is new when compared to prior examination from 2018.      3. Tiny focal outpouching from the supraclinoid right internal carotid artery management which would include dual antiplatelet therapy along with high dose statin therapy for stroke prevention and management of significant intracranial atherosclerotic disease.       [facility] studied aggressive medical m exam worsens or patient is unable to wean from BP support. We will plan to obtain MRI brain tomorrow.   This needs to be coordinated with med tronic due to patients ppm. Once imaging is obtained we will evaluate for consideration of intracranial stent plac

## 2020-02-06 NOTE — CONSULTS
[facility]  Vascular Surgery Consultation    Austintrupti Mckeongilberto Patient Status:  Inpatient    1945 MRN [id number]   [facility] 6NE-A Attending Erin Alexandra MD   [facility] Day # 2 PCP Debra Borges MD     Reason for Consultation:  Farideh Palomares (VOLTAREN) 1 % gel, , Topical, BID  •  insulin detemir (LEVEMIR) 100 UNIT/ML flextouch 10 Units, 10 Units, Subcutaneous, Nightly  •  glucose (DEX4) oral liquid 15 g, 15 g, Oral, Q15 Min PRN **OR** Glucose-Vitamin C (DEX-4) chewable tab 4 tablet, 4 tablet, sulfate (PF) 4 MG/ML injection 4 mg, 4 mg, Intravenous, Q2H PRN  •  magnesium oxide (MAG-OX) tab 400 mg, 400 mg, Oral, TID  •  acetaminophen (TYLENOL) tab 650 mg, 650 mg, Oral, Q4H PRN **OR** acetaminophen (TYLENOL) 650 MG rectal suppository 650 mg, 650 mg tablet, Oral, Q15 Min PRN  •  Labetalol HCl (NORMODYNE) tab 200 mg, 200 mg, Oral, BID  •  gabapentin (NEURONTIN) cap 400 mg, 400 mg, Oral, Nightly  •  Losartan Potassium (COZAAR) tab 75 mg, 75 mg, Oral, BID    Review of Systems:    CONSTITUTIONAL: denies f Plan:  Left ICA stenosis - discussed with daughter- segment is in skull. Will defer to Neuro IR. I reviewed the remainder of the cta and there are no other stenoses to treat     Thank you for allowing me to participate in the care of your patient.     Kaushal Kelly

## 2020-02-06 NOTE — PROGRESS NOTES
2000- received pt alert. Pt less aphasic, can answer questions with one word answers. Orientated. Vss. Aman infusing as ordered, will titrate accordingly to sbp. bp 136/77 with no change in pt's speech. No c/o, no apparent distress noted.

## 2020-02-06 NOTE — OCCUPATIONAL THERAPY NOTE
OCCUPATIONAL THERAPY EVALUATION - INPATIENT     Room Number: 8293/5218-H  Evaluation Date: 2/6/2020  Type of Evaluation: Initial  Presenting Problem: CVA    Physician Order: IP Consult to Occupational Therapy  Reason for Therapy: ADL/IADL Dysfunction and D LAPAROSCOPIC HYSTERECTOMY Bilateral 2/4/2020    Performed by Tamela Olguin MD at [address] [facility] SITUATION  Type of Home: House  Home Layout: One level  Lives With: Alone;Caregiver part-time    Toilet and Equipment: Stand NEUROLOGICAL FINDINGS                   ACTIVITY TOLERANCE                         O2 SATURATIONS                ACTIVITIES OF DAILY LIVING ASSESSMENT  AM-PAC ‘6-Clicks’ Inpatient Daily Activity Short Form  How much help from another person does the pa activities of daily living, rest and sleep, work, leisure and social participation.      The patient is functioning below her previous functional level and would benefit from skilled inpatient OT to address the above deficits, maximizing patient’s ability t

## 2020-02-06 NOTE — PROGRESS NOTES
[facility]  Neurocritical Care       Subjective: Manisha Montez is a(n) 69year old female still has exp aphasia, MI on Cangrelor.   2/6/2020 - MRI Tej Franco did show watershed infarcts in the in the left frontal lobe, left parietal lobe, le CT, CT BRAIN OR HEAD (74700), 3/20/2018, 22:29. INDICATIONS:  CODE STROKE  TECHNIQUE:  Noncontrast CT scanning is performed through the brain. Dose reduction techniques were used.  Dose information is transmitted to the  [facility]) reduction techniques were used. Dose information is transmitted to the [facility]) NRDR ([address]) which includes the Dose Index Registry.   PATIENT STATED HISTORY:(As transcribed by Technologist)    CONTRAST Intimal irregularities with moderate to severe stenosis and multiple segments in the left V4 proximally is stable. Distally this is new when compared to prior exam (image 340). Takeoff of bilateral posterior inferior cerebellar arteries is unremarkable. the right anterior cerebral artery A2 branch has increased since prior examination. This is likely due to atherosclerotic disease. 8. Moderate stenosis involving bilateral M 2 branches is relatively stable.   9. Moderate stenosis in the mid basilar arter Q12H  0.9% NaCl infusion, , Intravenous, Continuous  Insulin Aspart Pen (NOVOLOG) 100 UNIT/ML flexpen 1-5 Units, 1-5 Units, Subcutaneous, TID CC and HS  hydrALAzine HCl (APRESOLINE) injection 10 mg, 10 mg, Intravenous, Q6H PRN  Zolpidem Tartrate (AMBIEN) t 100-200 mcg/min, Intravenous, Continuous PRN  Senna (SENOKOT) tab 17.2 mg, 17.2 mg, Oral, Nightly  docusate sodium (COLACE) cap 100 mg, 100 mg, Oral, BID  PEG 3350 (MIRALAX) powder packet 17 g, 17 g, Oral, Daily PRN  magnesium hydroxide (MILK OF MAGNESIA) native artery    Pure hypercholesterolemia    Exp Aphasia post-op from watershed infarcts in the in the left frontal lobe, left parietal lobe, left occipital lobe, likely from hypoperfusion.   LICA severe stenosis cavernous segment  H/o RMCA stroke with lef clinical staff. Thank you for allowing me to participate in the care of this patient. Disha Childers MD  Medical Director - Stroke and Neurocritical Care  [facility] - In affiliation with [facility].   Board Certified

## 2020-02-07 NOTE — OCCUPATIONAL THERAPY NOTE
Attempted to see pt for OT tx; however pt being taken for MRI. Will try later today as appropriate and as time allows.

## 2020-02-07 NOTE — PROGRESS NOTES
Seen and examined early this am. Reviewed in detail at bedside with patient's daughters.     Speech much better yet remains frustrated and unwilling to cooperate in taking any oral medications - arguing with daughters despite their calm and appropriate supp

## 2020-02-07 NOTE — CONSULTS
Merlin [address]  [id number]  Hospital Day #3  Date of Consult:   2/7/2020        Reason for Consultation:      Consult requested by Dr. Esau Arango for evaluation of palliative care needs,  Goals of care too hard. \" I then asked if she wanted to give herself the opportunity to rehab and she shook her head yes. I encouraged her to eat and she shook her head no.  Prior to her first stroke she did not swallow pills, she learned after and currently is hesitant 250 mL IVPB, 40 mEq, Intravenous, Once  •  aspirin EC tab 81 mg, 81 mg, Oral, Daily **OR** aspirin 300 MG rectal suppository 300 mg, 300 mg, Rectal, Daily  •  Pantoprazole Sodium (PROTONIX) 20 mg in Sodium Chloride 0.9 % 10 mL IV push, 20 mg, Intravenous, **OR** HYDROcodone-acetaminophen (NORCO) 5-325 MG per tab 2 tablet, 2 tablet, Oral, Q4H PRN  •  morphINE sulfate (PF) 4 MG/ML injection 1 mg, 1 mg, Intravenous, Q2H PRN **OR** morphINE sulfate (PF) 4 MG/ML injection 2 mg, 2 mg, Intravenous, Q2H PRN **OR** Glucose-Vitamin C (DEX-4) chewable tab 4 tablet, 4 tablet, Oral, Q15 Min PRN **OR** dextrose 50 % injection 50 mL, 50 mL, Intravenous, Q15 Min PRN **OR** glucose (DEX4) oral liquid 30 g, 30 g, Oral, Q15 Min PRN **OR** Glucose-Vitamin C (DEX-4) chewable tab MD on 2/05/2020 at 15:41     Approved by: Arnoldo Florez MD on 2/05/2020 at 15:44          Xr Chest Ap Portable  (cpt=71045)    Result Date: 2/7/2020  CONCLUSION:  Minimal bibasilar atelectasis.     Dictated by: Aysha Russo MD on 2/07/2020 at 6:16 Reduced Drowsy/confused   20 Bedbound Extensive Disease  Can’t do any work Max Assist  Total Care Minimal Drowsy/confused   10 Bedbound/coma Extensive Disease  Can’t do any work  coma  Max Assist  Total Care Mouth care Drowsy or coma   0 Death     Palliati improve once she ingests what has taken place. She feels her mother would want full treatment with the goal to get better. Dr. Alexsandra Schmidt and her sister recognized she will need for assistance for daily living after this stroke.  They have been encouraging her - Disposition: ongoing goals of care      Thank you for allowing Palliative Care services to participate in the care of Ms. Frankel.      A total of 30 minutes were spent on this consult; which included all of the following: direct face to face contact,

## 2020-02-07 NOTE — PHYSICAL THERAPY NOTE
Attempted to see pt this morning for a treatment session but pt was being taken down for a MRI. Will attempt to see pt again at a later time.

## 2020-02-07 NOTE — PHYSICAL THERAPY NOTE
PHYSICAL THERAPY TREATMENT NOTE - INPATIENT    Room Number: 5465/8764-T     Session: 1  Number of Visits to Meet Established Goals: 5    Presenting Problem: pelvic mass s/p total hysterectomy on 2/4 post-op STEMI    [facility] related to current [facility]  \"No\" when asked to participate in therapy. Patient’s self-stated goal is unstated at this time.      OBJECTIVE  Precautions: (-180)    WEIGHT BEARING RESTRICTION  Weight Bearing Restriction: EXERCISES  Lower Extremity Ankle pumps  Hip AB/AD  Heel slides     Upper Extremity Elbow flex/ext and Shoulder flex/ext     Position Supine     Repetitions   5   Sets   1     Patient End of Session: In bed;Needs met;Call light within reach;RN aware of sess

## 2020-02-07 NOTE — PLAN OF CARE
Assumed care of pt 1930. A+Ox4, but fatigued, irritable. See complex neuro exam flowsheet. NSR. HR 80-100s. SBP maintaining within parameters on phenylephrine in NaCl @90 mcg/min. Resp pattern irregular, bilat breath sounds clear, diminished.  VSS on 2 L NC

## 2020-02-07 NOTE — SLP NOTE
Patient was not wanting to participate in skilled dysphagia therapy or cog/comm assessment on this date. Will continue to follow patient and attempt cog/comm assessment as level of participation improves.      Lois Sampson MA, CCC-SLP  Speech Language Pa

## 2020-02-07 NOTE — PROGRESS NOTES
Pt awake and irritable. Sometimes unable to complete neuro exam d/t pt unwilling to participate. Pt just says \"NO\" when asked to do something. Arguing over every nursing care. Refusing meds and refusing to eat.  Daughters concerned over one blood pressure

## 2020-02-07 NOTE — CONSULTS
[address] [facility] Patient Status:  Inpatient    1945 MRN [id number]   [facility] 6NE-A Attending Aurora Roman MD   [address] Day # 3 PCP Alexandre Grimm MD     Patient Identification  Almita Parra is a 69year old fem direction measuring 1.7 mm is relatively stable and of questionable clinical significance. This may represent an infundibulum.      4. Moderate narrowing of the very proximal supraclinoid internal carotid arteries bilaterally is again noted and relatively Oral, Q8H PRN  aspirin EC tab 81 mg, 81 mg, Oral, Daily    Or  aspirin 300 MG rectal suppository 300 mg, 300 mg, Rectal, Daily  Pantoprazole Sodium (PROTONIX) 20 mg in Sodium Chloride 0.9 % 10 mL IV push, 20 mg, Intravenous, QAM AC    Or  Pantoprazole [place] Or  HYDROcodone-acetaminophen (NORCO) 5-325 MG per tab 1 tablet, 1 tablet, Oral, Q4H PRN    Or  HYDROcodone-acetaminophen (NORCO) 5-325 MG per tab 2 tablet, 2 tablet, Oral, Q4H PRN  morphINE sulfate (PF) 4 MG/ML injection 1 mg, 1 mg, Intravenous, Q2H PRN [COMPLETED] Heparin Sodium (Porcine) 5000 UNIT/ML injection, , ,   [COMPLETED] Heparin Sodium (Porcine) 5000 UNIT/ML injection, , ,   Rosuvastatin Calcium (CRESTOR) tab 20 mg, 20 mg, Oral, Nightly  [COMPLETED] Sterile Water for Injection injection, , , (SEE COMMENTS)    Comment:weakness  Lipitor [Atorvastat*    INSOMNIA        Lab Results   Component Value Date    WBC 22.3 02/07/2020    HGB 8.7 02/07/2020    HCT 26.5 02/07/2020    .0 02/07/2020    CREATSERUM 1.10 02/07/2020    BUN 38 02/07/2020 Strength is 2. ROM WNL. Right Lower Extremity:  Strength  is 3-4. ROM WNL. Left Lower Extremity: Strength  is 3. ROM WNL. Neuro: CNII-XII are grossly intact.  Sensation to dull touch intact in all extremities and reflexes are 2+,

## 2020-02-07 NOTE — PROGRESS NOTES
[facility]  Neurocritical Care       Subjective: Julian Garcia is a(n) 69year old female still has exp aphasia, MI on Cangrelor.   2/6/2020 - MRI Alicia Calderon did show watershed infarcts in the in the left frontal lobe, left parietal lobe, le Iv)(epy=[id number])    Result Date: 2/4/2020  PROCEDURE:  CT STROKE BRAIN (NO IV)(CPT=70450)  COMPARISON:  [facility] , CT, CT BRAIN OR HEAD (43619), 3/20/2018, 22:29. INDICATIONS:  CODE STROKE  TECHNIQUE:  Noncontrast CT scanning is performed through the brain. carotid and vertebral arteries. All measurements obtained in this exam were performed using NASCET. Dose reduction techniques were used.  Dose information is transmitted to the ACR ([facility]) Roc Wiggins [address] ([address]) [facility] examination likely related to atherosclerotic plaque.  (image 391). Bilateral V4 segments are identified. Intimal irregularities with moderate to severe stenosis and multiple segments in the left V4 proximally is stable.   Distally this is new when compar major arterial structures in the head. 7. Multiple segments with moderate to severe stenosis throughout the right anterior cerebral artery A2 branch has increased since prior examination. This is likely due to atherosclerotic disease.    8. Moderate steno 83   AST 15  --  107* 105*   ALT 18  --  21 24   BILT 0.6  --  0.7 0.9   TP 7.0  --  6.2* 6.6       Medications:   aspirin EC tab 81 mg, 81 mg, Oral, Daily    Or  aspirin 300 MG rectal suppository 300 mg, 300 mg, Rectal, Daily  Pantoprazole Sodium (PROTONI tablet, 1 tablet, Oral, Q4H PRN    Or  HYDROcodone-acetaminophen (NORCO) 5-325 MG per tab 2 tablet, 2 tablet, Oral, Q4H PRN  morphINE sulfate (PF) 4 MG/ML injection 1 mg, 1 mg, Intravenous, Q2H PRN    Or  morphINE sulfate (PF) 4 MG/ML injection 2 mg, 2 mg, PRN    Or  Glucose-Vitamin C (DEX-4) chewable tab 8 tablet, 8 tablet, Oral, Q15 Min PRN  Labetalol HCl (NORMODYNE) tab 200 mg, 200 mg, Oral, BID  gabapentin (NEURONTIN) cap 400 mg, 400 mg, Oral, Nightly  Losartan Potassium (COZAAR) tab 75 mg, 75 mg, Oral, NS     GI:  · GI Prophylaxis  · Bowel Regimen      Heme/ID:  · WBC 22.3, check procalcitonin 0.94  · Monitor H/H with goal hemoglobin more than 7gm/dl  Endocrine:  · Hyperglycemia protocol if req  Skin:  · Monitor for skin breakdown.   DVT Prophylaxis:  · S

## 2020-02-07 NOTE — PROGRESS NOTES
[facility]  Interventional Neuroradiology Progress Note    Abhay Payment Patient Status:  Inpatient    1945 MRN [id number]   [facility] 6NE-A Attending Max Colon MD   [facility] Day # 3 PCP Elvin Peter MD       Subjective: 02/07/2020    HCT 26.5 02/07/2020    .0 02/07/2020    CREATSERUM 1.10 02/07/2020    BUN 38 02/07/2020     02/07/2020    K 3.5 02/07/2020     02/07/2020    CO2 23.0 02/07/2020     02/07/2020    CA 8.4 02/07/2020    ALB 2.9 02/07/20 severe stenosis is new when compared to prior examination and likely due to atherosclerotic disease.       Assessment:  Acute L hemisphere infarcts  Vascular risk factors: HTN, DM, HLD, prior stroke and MI in 2018  S/P elective hysterectomy     Plan:  Revie

## 2020-02-08 NOTE — PROGRESS NOTES
[facility] Hospitalist Progress Note     Otilio Lyons Patient Status:  Inpatient    1945 MRN [id number]   [facility] 6NE-A Attending Cayla Johnson MD   [address] Day # 3 PCP Anthony Wei MD     CC: follow up    SUBJECTIVE:  Pt irritable along with the left occipital lobe near the left PCA territory are again identified.    Dictated by: Shira Patel MD on 2/07/2020 at 11:22     Approved by: Shira Patel MD on 2/07/2020 at 11:25          Xr Chest Ap Portable  (cpt=71045)    Resul HCl, Saline Nasal Spray, glucose **OR** Glucose-Vitamin C **OR** dextrose **OR** glucose **OR** Glucose-Vitamin C        Assessment/Plan:     69year old F w/ PMHx CVA w/ resultant L sided hemiparesis, HTN, HLD, DMII.  She was admitted for scheduled robotic - - -

## 2020-02-08 NOTE — PROGRESS NOTES
[facility]  Neurocritical Care       Subjective: Maura Boo is a(n) 69year old female still has exp aphasia, MI on Cangrelor.   2/7/2020 - MRI Sina Thaddeus did show watershed infarcts in the in the left frontal lobe, left parietal lobe, le Iv)(uth=64193)    Result Date: 2/4/2020  PROCEDURE:  CT STROKE BRAIN (NO IV)(CPT=70450)  COMPARISON:  RITA CT, CT BRAIN OR HEAD (97533), 3/20/2018, 22:29. INDICATIONS:  CODE STROKE  TECHNIQUE:  Noncontrast CT scanning is performed through the brain. carotid and vertebral arteries. All measurements obtained in this exam were performed using NASCET. Dose reduction techniques were used.  Dose information is transmitted to the ACR ([facility]) Roc Wiggins 35 ([address]) [facility] examination likely related to atherosclerotic plaque.  (image 391). Bilateral V4 segments are identified. Intimal irregularities with moderate to severe stenosis and multiple segments in the left V4 proximally is stable.   Distally this is new when compar major arterial structures in the head. 7. Multiple segments with moderate to severe stenosis throughout the right anterior cerebral artery A2 branch has increased since prior examination. This is likely due to atherosclerotic disease.    8. Moderate steno < > 20.0* 23.0 26.0   ALKPHO 114  --  86 83  --    AST 15  --  107* 105*  --    ALT 18  --  21 24  --    BILT 0.6  --  0.7 0.9  --    TP 7.0  --  6.2* 6.6  --     < > = values in this interval not displayed.        Medications:   potassium chloride (KLOR-C rectal suppository 25 mg, 25 mg, Rectal, Q12H PRN  diphenhydrAMINE HCl (BENADRYL) injection 12.5 mg, 12.5 mg, Intravenous, Q4H PRN  Enoxaparin Sodium (LOVENOX) 40 MG/0.4ML injection 40 mg, 40 mg, Subcutaneous, Nightly  acetaminophen (TYLENOL) tab 650 mg, 6 Q15 Min PRN    Or  dextrose 50 % injection 50 mL, 50 mL, Intravenous, Q15 Min PRN    Or  glucose (DEX4) oral liquid 30 g, 30 g, Oral, Q15 Min PRN    Or  Glucose-Vitamin C (DEX-4) chewable tab 8 tablet, 8 tablet, Oral, Q15 Min PRN  gabapentin (NEURONTIN) ca 0800  Gross per 24 hour   Intake 1276 ml   Output 0 ml   Net 1276 ml     · IV Fluids NS     GI:  · GI Prophylaxis  · Bowel Regimen      Heme/ID:  · WBC 18.6  · Monitor H/H with goal hemoglobin more than 7gm/dl  Endocrine:  · Hyperglycemia protocol if req

## 2020-02-08 NOTE — PROGRESS NOTES
[facility]  Interventional Neuroradiology Progress Note    Julian Garcia Patient Status:  Inpatient    1945 MRN [id number]   [place] [facility] 6NE-A Attending Ngoc Núñez MD   [facility] Day # 4 PCP Mikki Gil MD         Subjective: scooter to get around at home       Labs:  Lab Results   Component Value Date    WBC 18.6 02/08/2020    HGB 9.0 02/08/2020    HCT 27.0 02/08/2020    .0 02/08/2020    CREATSERUM 1.21 02/08/2020    BUN 46 02/08/2020     02/08/2020    K 3.4 02/08 stenosis involving bilateral M 2 branches is relatively stable.   9. Moderate stenosis in the mid basilar artery along with the distal left V4 segment having severe stenosis is new when compared to prior examination and likely due to atherosclerotic disease

## 2020-02-08 NOTE — PROGRESS NOTES
RN called into the room. Daughter concerned the Bp was too low. (not far from parameter) Explained RN will recheck in 10 mins. Blood pressure set for every 15 mins. Daughter dictating care.  Persistent on rechecking Bp that waiting for 10 minutes was inapp

## 2020-02-08 NOTE — PLAN OF CARE
Transferred to CTU 7 @ [phone]  Patient alert and oriented x4  No acute neuro changes  Sepsis fire.  Dr. Elfego Urena paged  Room air  No complaints of pain  Encouraged PO intake  Taking pills whole in ice cream    Plan of care discussed with patient and daughters

## 2020-02-08 NOTE — CONSULTS
[facility]  Report of Consultation    Willy Cardenas Patient Status:  Inpatient    1945 MRN [id number]   [facility] 6NE-A Attending Jamie Orellana MD   [facility] Day # 4 PCP Phuc Jurado MD     Reason for Consultation:  ROLANDO    His INSOMNIA    Medications:    Current Facility-Administered Medications:   •  potassium chloride (KLOR-CON) powder packet 40 mEq, 40 mEq, Oral, Q4H  •  potassium chloride 40 mEq in sodium chloride 0.9% 250 mL IVPB, 40 mEq, Intravenous, Once  •  Clopidogrel B Intravenous, Q4H PRN  •  Enoxaparin Sodium (LOVENOX) 40 MG/0.4ML injection 40 mg, 40 mg, Subcutaneous, Nightly  •  acetaminophen (TYLENOL) tab 650 mg, 650 mg, Oral, Q4H PRN **OR** HYDROcodone-acetaminophen (NORCO) 5-325 MG per tab 1 tablet, 1 tablet, Oral, chewable tab 4 tablet, 4 tablet, Oral, Q15 Min PRN **OR** dextrose 50 % injection 50 mL, 50 mL, Intravenous, Q15 Min PRN **OR** glucose (DEX4) oral liquid 30 g, 30 g, Oral, Q15 Min PRN **OR** Glucose-Vitamin C (DEX-4) chewable tab 8 tablet, 8 tablet, Oral, Date    WBC 18.6 02/08/2020    HGB 9.0 02/08/2020    HCT 27.0 02/08/2020    .0 02/08/2020    CREATSERUM 1.21 02/08/2020    BUN 46 02/08/2020     02/08/2020    K 3.4 02/08/2020     02/08/2020    CO2 26.0 02/08/2020     02/08/2020 overall. No new stroke noted. Per neuro. Has old stroke with L hemiparesis    #4. STEMI- s/p PTCA/KISHA to RCA. On cangrelor. Per cards    #5.  Pelvic mass- s/p hysterectomy/BSO        Thank you for allowing me to participate in the care of your patient

## 2020-02-08 NOTE — PLAN OF CARE
Assumed care of pt 1930. A+Ox4, but fatigued, irritable. See complex neuro exam flowsheet. Pt denies pain. NSR/ST. HR 80-100s. SBP maintaining within parameters titrating phenylephrine in NaCl. Resp pattern irregular, bilat breath sounds clear, diminished.

## 2020-02-08 NOTE — PROGRESS NOTES
[facility]    Electrophysiology Follow Up Note      Rupinder Bella Patient Status:  Inpatient    1945 MRN [id number]   [facility] 6NE-A Attending Candice Arana MD   [facility] Day # 4 PCP Sally Cuevas MD     Reason (NOVOLOG) 100 UNIT/ML flexpen 2-10 Units, 2-10 Units, Subcutaneous, TID CC and HS  •  furosemide (LASIX) injection 40 mg, 40 mg, Intravenous, BID (Diuretic)  •  metoprolol Tartrate (LOPRESSOR) 5 MG/5ML injection 5 mg, 5 mg, Intravenous, Q8H  •  Mike Clark 17 g, Oral, Daily PRN  •  magnesium hydroxide (MILK OF MAGNESIA) 400 MG/5ML suspension 30 mL, 30 mL, Oral, Daily PRN  •  bisacodyl (DULCOLAX) rectal suppository 10 mg, 10 mg, Rectal, Daily PRN  •  Fleet Enema (FLEET) 7-19 GM/118ML enema 133 mL, 1 enema, Re DTP  Abdomen: Soft, non distended  Extremities: Warm, well perfused, normal cap refill, no edema  Skin: Warm and dry.        Labs:     Recent Labs   Lab 02/06/20  0610 02/07/20  0427 02/08/20  0430   * 181* 174*   BUN 39* 38* 46*   CREATSERUM 1.32* 1 staff.        Diagnosis:  Patient Active Problem List:     Cerebrovascular accident [facility])     Cerebrovascular accident (CVA) due to thrombosis of precerebral artery ([facility] [address]     Type 2 diabetes mellitus with complication, without long-term current use of insu

## 2020-02-08 NOTE — PROGRESS NOTES
[facility] Hospitalist Progress Note     Safia Hussain Patient Status:  Inpatient    1945 MRN [id number]   [facility] 6NE-A Attending Katelynn Pruitt MD   [facility] Day # 4 PCP Odalys Hill MD     CC: follow up    SUBJECTIVE:  Per daughter Imaging:        Meds:   Scheduled Medication:  • potassium chloride  40 mEq Oral Q4H   • potassium chloride 40mEq IVPB (peripheral line)  40 mEq Intravenous Once   • aspirin EC  81 mg Oral Daily    Or   • aspirin  300 mg Rectal Daily   • pantoprazole for pelvic cyst.      # Pelvic mass/cyst  - per OB-GYN  - s/p scheduled robotic-assisted hysterectomy with b/l salpingo-oophorectomy with OB/GYN on 2/4  - path appears benign     # Post-op acute inferior STEMI  - per cards  - s/p PCI to RCA   - on cangrelo

## 2020-02-08 NOTE — PLAN OF CARE
See neuro documentation. O2 stable on RA.   SR. SBP goal 130-180. Afebrile. Encourage PO intake. Incontinent. No BM- refusing colace. Abd skin glue sites CDI. R midline.    Dangled on edge of bed this afternoon- BP stable 140's  Transfer to CTU7    Pr and liquids at a slow rate  - No straws  - Encourage small bites of food and small sips of liquid  - Offer pills one at a time, crush or deliver with applesauce as needed  - Discontinue feeding and notify MD (or speech pathologist) if coughing or persisten

## 2020-02-09 NOTE — PLAN OF CARE
Removed pt Nicholas roy, noticed left foot is cool, purplish in color and pulses are non-palpable. Could not find pulses w/ doppler x 2 RN's checking. Dr. Lai Watts notified. Stated to notify Vascular. Dr. Jerry Mendosa notified, CTA ordered.

## 2020-02-09 NOTE — PROGRESS NOTES
[facility] Hospitalist Progress Note     Keisha Hill Patient Status:  Inpatient    1945 MRN [id number]   [facility] 7NE-A Attending Gelacio Vogt MD   [address] Day # 5 PCP Yung Vargas MD     CC: follow up    SUBJECTIVE:  Out of ICU  R Labs   Lab 02/08/20  1732 02/08/20 2003 02/08/20  2110 02/09/20  0620 02/09/20  1209   PGLU 201* 201* 184* 174* 202*       Imaging:        Meds:   Scheduled Medication:  • Clopidogrel Bisulfate  75 mg Oral Daily   • aspirin EC  81 mg Oral Daily    Or   • scheduled robotic-assisted hysterectomy with b/l salpingo-oophorectomy with OB/GYN on 2/4  - path appears benign     # Post-op acute inferior STEMI  - per cards  - s/p PCI to RCA  - IV cangrelor -> PO plavix  - ASA  - BB  - also with MV disease, moderate L

## 2020-02-09 NOTE — PROGRESS NOTES
[facility]  Interventional Neuroradiology Progress Note    Almita Parra Patient Status:  Inpatient    1945 MRN [id number]   [facility] 7NE-A Attending Aurora Roman MD   [facility] Day # 5 PCP Alexandre Grimm MD         Subjective: 02/09/2020    CREATSERUM 1.51 02/09/2020    BUN 50 02/09/2020     02/09/2020    K 3.5 02/09/2020     02/09/2020    CO2 28.0 02/09/2020     02/09/2020    CA 8.5 02/09/2020    PGLU 174 02/09/2020       Imaging:    MRI brain 2/6: CONCLUSION severe stenosis is new when compared to prior examination and likely due to atherosclerotic disease.       • Clopidogrel Bisulfate  75 mg Oral Daily   • aspirin EC  81 mg Oral Daily    Or   • aspirin  300 mg Rectal Daily   • pantoprazole (PROTONIX) IV push

## 2020-02-09 NOTE — PLAN OF CARE
Assumed care of pt 1930. Aox4. Family at bedside. 5 lap sites with bruising, no drainage noted. Sites soft. Neuro checks q4hrs. RA, lungs clear, but dim bilat. Sats on RA 95%. Respirations easy and unlabored.  Pt requesting 2l02 with bubbler, for comfort, s Implement neutropenic guidelines  Outcome: Progressing     Problem: SAFETY ADULT - FALL  Goal: Free from fall injury  Description  INTERVENTIONS:  - Assess pt frequently for physical needs  - Identify cognitive and physical deficits and behaviors that affe hemorrhage  - Monitor temperature, glucose, and sodium.  Initiate appropriate interventions as ordered  Outcome: Progressing  Goal: Achieves maximal functionality and self care  Description  INTERVENTIONS  - Monitor swallowing and airway patency with patien Activities of Daily Living  Goal: Achieve highest/safest level of independence in self care  Description  Interventions:  - Assess ability and encourage patient to participate in ADLs to maximize function  - Promote sitting position while performing ADLs s

## 2020-02-09 NOTE — PROGRESS NOTES
[facility]  Progress Note    Julian Garcia Patient Status:  Inpatient    1945 MRN [id number]   [facility] 7NE-A Attending Ngoc Núñez MD   [address] Day # 5 PCP Mikki Gil MD       Assessment and Plan:  Patient Active Proble work  Continue aspirin, Plavix, beta-blocker and statin. Eventually would like to start an ACE inhibitor if blood pressure and renal function allow and if patient is agreeable  We will monitor diuretic needs.   Currently she is taking furosemide  Continue CA 8.5 02/09/2020    PGLU 174 02/09/2020       Medications:  Clopidogrel Bisulfate (PLAVIX) tab 75 mg, 75 mg, Oral, Daily  ondansetron HCl (ZOFRAN) injection 4 mg, 4 mg, Intravenous, Q6H PRN    Or  Ondansetron HCl (ZOFRAN) tab 4 mg, 4 mg, Oral, Q8H PRN tab 1 tablet, 1 tablet, Oral, Q4H PRN    Or  HYDROcodone-acetaminophen (NORCO) 5-325 MG per tab 2 tablet, 2 tablet, Oral, Q4H PRN  morphINE sulfate (PF) 4 MG/ML injection 1 mg, 1 mg, Intravenous, Q2H PRN    Or  morphINE sulfate (PF) 4 MG/ML injection 2 mg,

## 2020-02-09 NOTE — PROGRESS NOTES
[facility]  Nephrology Progress Note    Otilio Lyons Patient Status:  Inpatient    1945 MRN [id number]   [facility] 7NE-A Attending Cayla Johnson MD   [facility] Day # 5 PCP Anthony Wei MD       SUBJECTIVE:  Notes reviewed, sta 55*  --    CREATSERUM 1.18* 1.34* 1.32* 1.10* 1.21*  --    CA 9.0 8.3* 8.3* 8.4* 8.6  --    MG 2.4  --   --   --   --   --    * 224* 218* 181* 174*  --        Recent Labs   Lab 02/04/20  1426 02/06/20  0610 02/07/20  0427   ALT 18 21 24   AST 15 107 PRN  Prochlorperazine Maleate (COMPAZINE) tab 10 mg, 10 mg, Oral, Q12H PRN    Or  prochlorperazine (COMPAZINE) rectal suppository 25 mg, 25 mg, Rectal, Q12H PRN  diphenhydrAMINE HCl (BENADRYL) injection 12.5 mg, 12.5 mg, Intravenous, Q4H PRN  Enoxaparin So Oral, Q15 Min PRN    Or  dextrose 50 % injection 50 mL, 50 mL, Intravenous, Q15 Min PRN    Or  glucose (DEX4) oral liquid 30 g, 30 g, Oral, Q15 Min PRN    Or  Glucose-Vitamin C (DEX-4) chewable tab 8 tablet, 8 tablet, Oral, Q15 Min PRN  gabapentin (NEURONT

## 2020-02-10 NOTE — PROGRESS NOTES
Alert and in reasonable spirits.     Afebrile  144/65  107 regular    Lungs clear anteriorly  Ht RRR  abd soft  Ext left foot with mild cyanosis toes and distal planter surface - slightly cooler than right but not severe - no pain - moving foot - intrinsic

## 2020-02-10 NOTE — PROGRESS NOTES
[facility]  Interventional Neuroradiology Progress Note    Safia Hussain Patient Status:  Inpatient    1945 MRN [id number]   [facility] 7NE-A Attending Katelynn Pruitt MD   [facility] Day # 6 PCP Odalys Hill MD       Based on our

## 2020-02-10 NOTE — PHYSICAL THERAPY NOTE
PHYSICAL THERAPY TREATMENT NOTE - INPATIENT    Room Number: 2505/7293-G   Session: 2  Number of Visits to Meet Established Goals: 5    Presenting Problem: pelvic mass s/p total hysterectomy on 2/4 post-op STEMI    History related to current admission:   P left eye   • OTHER      Pacemaker   • XI ROBOT-ASSISTED LAPAROSCOPIC HYSTERECTOMY Bilateral 2/4/2020    Performed by Jamie Orelalna MD at [address]  \"I remember you, you can go\"    Patient’s self-stated goal is unstated at this time term/long term goals for optimal functional independence and safety.     Transfers    Supine<>Sit: Max A x 2  Sit<>Stand: NT  Transfers: marquita   Gait: See above flow sheet  Chair Follow: NA  Sit<>Supine: Max x 2   Stand<>Sit: NT    Comments related to [place] that patient may achieve highest functional independence/return to baseline. DISCHARGE RECOMMENDATIONS  PT Discharge Recommendations: Acute rehabilitation     PLAN  PT Treatment Plan: Bed mobility; Endurance; Patient education;Gait training;Strengthen

## 2020-02-10 NOTE — PROGRESS NOTES
[facility]  Vascular Surgery Progress Note    Safia Nixon Patient Status:  Inpatient    1945 MRN [id number]   [facility] 7NE-A Attending Katelynn Pruitt MD   [facility] Day # 5 PCP Odalys Hill MD     Objective:   Temp: 100 °F (3 artery is not patent. 2. 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 The distal right superficial femoral artery reconstitutes at the adductor canal from collaterals in the thigh from the profunda femoral artery.   There is m hemiparesis (HCC)     Hyperlipidemia     Non compliance w medication regimen     Rotator cuff syndrome of right shoulder     Impingement syndrome, hip, right     Osteoarthritis of right acromioclavicular joint     Pelvic mass     History of stroke     [place] Units, Subcutaneous, TID CC and HS  furosemide (LASIX) injection 40 mg, 40 mg, Intravenous, BID (Diuretic)  metoprolol Tartrate (LOPRESSOR) 5 MG/5ML injection 5 mg, 5 mg, Intravenous, Q8H  Normal Saline Flush 0.9 % injection 10 mL, 10 mL, Intravenous, Q12H injection 10 mg, 10 mg, Intravenous, Q8H PRN  Rosuvastatin Calcium (CRESTOR) tab 20 mg, 20 mg, Oral, Nightly  Saline Nasal Spray (SALINE MIST) 1 spray, 1 spray, Each Nare, Q3H PRN  glucose (DEX4) oral liquid 15 g, 15 g, Oral, Q15 Min PRN    Or  Glucose-Vit

## 2020-02-10 NOTE — PLAN OF CARE
Assumed care of pt at 1900. Pt AOx4. Family at bedside. NSR/ST. 5 lap sites intact. Neuro protocol. Left foot cool, discolored w/o pulses. Vascular consulted and Heparin gtt initiated.   Pt to be kept NPO after MN for possible Angio or surgical inter

## 2020-02-10 NOTE — PROGRESS NOTES
[facility]  Nephrology Progress Note    Sabine Lipscomb Attending:  Raphael Nguyen MD       Assessment and Plan:    1) ROLANDO- somewhat higher Cr as expected given multiple insults including contrast studies x 3 (CTA x 2 + cardiac cath) superimposed o CREATSERUM 1.52 02/10/2020    BUN 45 02/10/2020     02/10/2020    K 3.4 02/10/2020     02/10/2020    CO2 29.0 02/10/2020     02/10/2020    CA 8.3 02/10/2020    PTT 39.2 02/10/2020    PGLU 202 02/10/2020       Imaging:   All imaging ja PRN  Prochlorperazine Maleate (COMPAZINE) tab 10 mg, 10 mg, Oral, Q12H PRN    Or  prochlorperazine (COMPAZINE) rectal suppository 25 mg, 25 mg, Rectal, Q12H PRN  diphenhydrAMINE HCl (BENADRYL) injection 12.5 mg, 12.5 mg, Intravenous, Q4H PRN  acetaminophen PRN    Or  glucose (DEX4) oral liquid 30 g, 30 g, Oral, Q15 Min PRN    Or  Glucose-Vitamin C (DEX-4) chewable tab 8 tablet, 8 tablet, Oral, Q15 Min PRN  gabapentin (NEURONTIN) cap 400 mg, 400 mg, Oral, Nightly          Questions/concerns were discussed wit

## 2020-02-10 NOTE — PROGRESS NOTES
[facility] Hospitalist Progress Note     Sabine Lipscomb Patient Status:  Inpatient    1945 MRN [id number]   [facility] 7NE-A Attending Raphael Nguyen MD   [facility] Day # 6 PCP Alina Del Angel MD     CC: follow up    SUBJECTIVE:  Overnight lida 1.52*   CA 9.0   < > 8.3* 8.4* 8.6  --  8.5 8.3*   MG 2.4  --   --   --   --   --   --   --    *   < > 218* 181* 174*  --  165* 189*    < > = values in this interval not displayed.        Recent Labs   Lab 02/04/20  1426 02/06/20  0610 02/07/20  0800 Darinel Davison MD on 2/09/2020 at 18:14     Approved by: Darinel Davison MD on 2/09/2020 at 18:42            Meds:   Scheduled Medication:  • pantoprazole (PROTONIX) IV push  20 mg Intravenous Q12H    Or   • Pantoprazole Sodium  20 mg Oral Q12H   • cards  - s/p PCI to RCA  - IV cangrelor -> PO plavix  - ASA  - BB  - also with MV disease, moderate LM stenosis,  of LAD and moderate-severe LCx disease  - CV surgery notified of case  - echo reviewed - LVEF 40%, anterior septum, anterior wall, and apex

## 2020-02-10 NOTE — DIETARY NOTE
[address] [place] [address]     Admitting diagnosis:  PELVIC MASS  Pelvic mass  History of stroke    Ht: 162.6 cm (5' 4\")  Wt: 82.9 kg (182 lb 12.2 oz). This is 152 % of IBW  Body mass index is 31.36 kg/m².   IBW: 54.5 kg    Labs

## 2020-02-10 NOTE — PROGRESS NOTES
Had extensive discussion with patient and both daughters  Confirmed she has foot drop at baseline in left leg. She has mild hyperemia. No mottling. Discussed both endo and open and observation options and scenarios and limitations of each one.  Pl

## 2020-02-10 NOTE — PROGRESS NOTES
Received page from RN in regards to patient with some lip swelling - noticed by daughters to possibly be getting worse. Sats stable on 1-2L NC. No significant wheezing per RN. She is on on an ACEi.  Only other new med added today would be heparin but she to

## 2020-02-10 NOTE — PROGRESS NOTES
NEUROLOGY PROGRESS NOTE     SUBJECTIVE:     Interval History: No events reported overnight. No new neurological symptoms reported.     OBJECTIVE   Temp:  [98.3 °F (36.8 °C)-100 °F (37.8 °C)] 98.3 °F (36.8 °C)  Pulse:  [] 109  Resp:  [17-22] 18  BP: (1 aspirin  300 mg Rectal Daily   • pantoprazole (PROTONIX) IV push  20 mg Intravenous QAM AC    Or   • Pantoprazole Sodium  20 mg Oral QAM AC   • Diclofenac Sodium   Topical BID   • Insulin Aspart Pen  2-10 Units Subcutaneous TID CC and HS   • metoprolol Tar from 2018. 3. Tiny focal outpouching from the supraclinoid right internal carotid artery in a posterior direction measuring 1.7 mm is relatively stable and of questionable clinical significance.  This may represent an infundibulum.   4. Moderate narrowing consult  -Pacemaker interrogation to determine if any underlying A-fib  -PTT goal should be 1.5-2 of baseline PTT; avoid bolus    Total face to face time was 35 minutes, more than 50% of the time was spent in counseling and/or coordination of care related

## 2020-02-10 NOTE — PROGRESS NOTES
Patient with no foot pain  Only pain behind thigh which may be unrelated.    Heparin started last night    CT scan reviewed - suspect insitu thrombosis     Discussed with daughters and patient challenges and risks  Would need general anesthesia to maintain

## 2020-02-10 NOTE — PLAN OF CARE
Assumed care at 0700. NIH 13, see charting. Pt is a/o x 4, verbalizes needs. Remains NSR/ST on tele. Finished 500ml bolus this am for bp <130. Able to maintain SBP >130 all day. Left foot noted to be cool, discolored w/o pulses. Vascular consulted.

## 2020-02-11 NOTE — PROGRESS NOTES
NEUROLOGY PROGRESS NOTE     SUBJECTIVE:     Interval History: No events reported overnight. No new neurological symptoms reported. Daughters at the bedside.     OBJECTIVE   Temp:  [97.6 °F (36.4 °C)-100.2 °F (37.9 °C)] 98.5 °F (36.9 °C)  Pulse:  [] 98 MethylPREDNISolone Sodium Succ  40 mg Intravenous Q8H   • Clopidogrel Bisulfate  75 mg Oral Daily   • aspirin EC  81 mg Oral Daily    Or   • aspirin  300 mg Rectal Daily   • Diclofenac Sodium   Topical BID   • Insulin Aspart Pen  2-10 Units Subcutaneous TI stable and of questionable clinical significance.  This may represent an infundibulum. 4. Moderate narrowing of the very proximal supraclinoid internal carotid arteries bilaterally is again noted and relatively stable.    5. Overall extensive atherosclerot neurological checks will inpatient  -No further neurology workup necessary at this time  -Follow up with her outpatient neurologist (Dr. Jude Palmer at [facility])    Total face to face time was 35 minutes, more than 50% of the time was spent in counseling and/o

## 2020-02-11 NOTE — PLAN OF CARE
Patient requested to speak to leadership regarding care. Patient expressed concern with staff washing her hair overnight, as well as waking her up to change her incontinent brief.  This RN explained that out of concern for her skin integrity that the patien

## 2020-02-11 NOTE — PLAN OF CARE
Assumed care at 0730  A&Ox4, VSS, NSR/ST on tele  BP within neuro's goal parameters  Heparin infusing at 11.5 ml/hr. PTT therapeutic. Redraw tomorrow AM  No left pedal or pulse tib pulses. Right pedal extremely faint.  Vascular aware  Left foot cool to touc

## 2020-02-11 NOTE — PROGRESS NOTES
[facility]  Nephrology Progress Note    Opal Burrows Attending:  Jenny Grullon MD       Assessment and Plan:    1) ROLANDO- as expected given multiple insults including contrast studies x 3 (CTA x 2 + cardiac cath)- resolved.  HL IV    2) Hypernatre 02/11/2020    CO2 25.0 02/11/2020     02/11/2020    CA 8.2 02/11/2020    PTT 53.5 02/10/2020    PGLU 336 02/11/2020       Imaging: All imaging studies reviewed.     Meds:   insulin detemir (LEVEMIR) 100 UNIT/ML flextouch 7 Units, 7 Units, Subcutaneo Or  HYDROcodone-acetaminophen (NORCO) 5-325 MG per tab 1 tablet, 1 tablet, Oral, Q4H PRN    Or  HYDROcodone-acetaminophen (NORCO) 5-325 MG per tab 2 tablet, 2 tablet, Oral, Q4H PRN  morphINE sulfate (PF) 4 MG/ML injection 1 mg, 1 mg, Intravenous, Q2H PRN

## 2020-02-11 NOTE — PROGRESS NOTES
1 [facility]  Follow Up     Abhay Payment  [id number]  Hospital Day #7  Date of Consult: 02/07/20  Patient seen at: [facility]     Subjective:      Patient was seen and examined without family at the bedside.  Blanca grimaldo aspirin EC tab 81 mg, 81 mg, Oral, Daily **OR** aspirin 300 MG rectal suppository 300 mg, 300 mg, Rectal, Daily  •  Diclofenac Sodium (VOLTAREN) 1 % gel, , Topical, BID  •  Insulin Aspart Pen (NOVOLOG) 100 UNIT/ML flexpen 2-10 Units, 2-10 Units, Subcutaneo Q6H PRN **OR** Metoclopramide HCl (REGLAN) injection 10 mg, 10 mg, Intravenous, Q8H PRN  •  Rosuvastatin Calcium (CRESTOR) tab 20 mg, 20 mg, Oral, Nightly  •  Saline Nasal Spray (SALINE MIST) 1 spray, 1 spray, Each Nare, Q3H PRN  •  glucose (DEX4) oral liq patent. 2. 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 The distal right superficial femoral artery reconstitutes at the adductor canal from collaterals in the thigh from the profunda femoral artery.   There is moderate stenosi Full   Normal Full   90 Full No disease  Normal Full Normal Full   80 Full Some disease  Normal w/effort Full Normal or  Reduced Full   70 Reduced Some disease  Can’t perform job Full Normal or   Reduced Full   60 Reduced Significant disease  Can’t perform palliative care to support her tomorrow? She would like another visit. Her daughters were updated.         Advance Care Planning counseling and discussion:  POLST/CODE STATUS:  Full code         HCPOA:        Healthcare Agent Appointed: Yes  Healthcare Ag

## 2020-02-11 NOTE — PROGRESS NOTES
[facility]  Cardiology Critical Care Progress Note    Keisha Hill Patient Status:  Inpatient    1945 MRN [id number]   [facility] 7NE-A Attending Gelacio Vogt MD   [facility] Day # 7 PCP Yung Vargas MD       Subjective:  Lori Snow CXR:      Medications:    • Pantoprazole Sodium  20 mg Oral Q12H   • PEG 3350  17 g Oral Daily   • diphenhydrAMINE  25 mg Oral Q8H   • insulin detemir  14 Units Subcutaneous Nightly   • MethylPREDNISolone Sodium Succ  40 mg Intravenous Q8H   • Clopid

## 2020-02-11 NOTE — CM/SW NOTE
Care Progression Note:  Active Acute Medical problem: admitted s/p RADHA/NADIA. Her post operative course was c/b inferior STEMI requiring PCI to RCA and multifocal CVA diagnosed on brain imaging.     Other Contributing Medical Factors/Dx.:  JUAN tanga

## 2020-02-11 NOTE — PROGRESS NOTES
[facility] Hospitalist Progress Note     Sabine Lipscomb Patient Status:  Inpatient    1945 MRN [id number]   [facility] 7NE-A Attending Raphael Nguyen MD   [address] Day # 7 PCP Alina Del Angel MD     CC: follow up    SUBJECTIVE:  Bottom lip re 38* 46*  --  50* 45* 34*   CREATSERUM 1.10* 1.21*  --  1.51* 1.52* 1.25*   CA 8.4* 8.6  --  8.5 8.3* 8.2*   * 174*  --  165* 189* 310*       Recent Labs   Lab 02/06/20  0610 02/07/20  0427   ALT 21 24   * 105*   ALB 2.7* 2.9*       Recent Lab was admitted for scheduled robotic-assisted hysterectomy with b/l salpingo-oophorectomy for pelvic cyst.      # Pelvic mass/cyst  - per OB-GYN  - s/p scheduled robotic-assisted hysterectomy with b/l salpingo-oophorectomy with OB/GYN on 2/4  - path appears prednisone tomorrow  - scheduled benadryl  - pepcid  - family inquiring about hereditary angioedema (pt's grandchild had episode last year). D/w allergist on call. States reasonable to check C2, C4, C1 esterase panel (total +functional).  Will refer patient

## 2020-02-11 NOTE — PLAN OF CARE
Patient alert and oriented times 3-4. Meds given per MAR crushed in ice cream. Vital signs stable. IV fluids per order. Kept clean and dry. Resting comfortably in bed. Call light in reach.

## 2020-02-11 NOTE — PLAN OF CARE
RN spoke with Dr. Mamta Hair with Neurology in regards to patient's request to not be woken up overnight to be changed, bathed, vitals, neuro checks, etc.    RN was not in room but per Dr. Mamta Hair, patient was educated by MD with family in the room of the [facility]

## 2020-02-11 NOTE — PROGRESS NOTES
SUBJECTIVE: No acute issues. Pain managed adequately    CC: Impaired ADL and mobility dysfuction due to Acute CVA following Hysterectomy  Interval History: Poor participation in therapies and refusal of acre! Palliative services on board.   Scheduled Meds: 02/11/20 0100 130/66 98.3 °F (36.8 °C) Oral 98 18 97 %   02/10/20 2100 149/82 100.2 °F (37.9 °C) Oral 115 18 97 %   02/10/20 1700 160/72 97.6 °F (36.4 °C) Oral 112 (!) 28 94 %       Intake/Output:    Intake/Output Summary (Last 24 hours) at 2/11/2020 165

## 2020-02-12 NOTE — OCCUPATIONAL THERAPY NOTE
OCCUPATIONAL THERAPY TREATMENT NOTE - INPATIENT     Room Number: 1554/7146-E  Session: 1   Number of Visits to Meet Established Goals: 5    Presenting Problem: CVA    History related to current admission: Pt is 69year old female admitted on 2/3/2020.  Pt i ROBOT-ASSISTED LAPAROSCOPIC HYSTERECTOMY Bilateral 2/4/2020    Performed by Nicola Rivas MD at [address]  \"I will do 5. \" referring to number of pegs she was using for fine motor.       OBJECTIVE  Precautions: (-180)    WEIGH and to take side steps with hand-held assistance. Pt was left with RN. Patient End of Session: In bed; With [facility] staff;Needs met;Call light within reach;RN aware of session/findings; All patient questions and concerns addressed; Family present    ASSESSMENT

## 2020-02-12 NOTE — PHYSICAL THERAPY NOTE
PHYSICAL THERAPY TREATMENT NOTE - INPATIENT    Room Number: 8758/4987-P   Session: 2  Number of Visits to Meet Established Goals: 5    Presenting Problem: pelvic mass s/p total hysterectomy on 2/4 post-op STEMI    History related to current admission:   P left eye   • OTHER      Pacemaker   • XI ROBOT-ASSISTED LAPAROSCOPIC HYSTERECTOMY Bilateral 2/4/2020    Performed by Nicola Rivas MD at [address]  \"I am just sleepy. \"    Patient’s self-stated goal is unstated at this time.      OBJ standing balance training with min to mod assist of one and cues for posture stability and head control. Pt performed sit to stand x 8 with assist and cues to improve strength and balance.    Pt ambulated with HHA of two side steps with cues and increas will be able to transfer from bed to wheelchair with minimal assistance. Ongoing       Goal #4  New Goal: Pt will improve AM-PAC score by 5 points from 72.57%.    Established on 2/12   Goal #5     Goal #6     Goal Comments: Goals established on 2/6/2020;

## 2020-02-12 NOTE — PROGRESS NOTES
[facility]  Nephrology Progress Note    Sydney Loredo Attending:  Namrata Salter MD       Assessment and Plan:    1) ROLANDO- as expected given multiple insults including contrast studies x 3 (CTA x 2 + cardiac cath)- essentially at baseline.      2) CO2 25.0 02/12/2020     02/12/2020    CA 8.3 02/12/2020    PTT 48.6 02/12/2020    PGLU 204 02/12/2020       Imaging: All imaging studies reviewed.     Meds:   Labetalol HCl (NORMODYNE) tab 100 mg, 100 mg, Oral, 2 times per day  predniSONE (Martha Grosser 2 mg, Intravenous, Q2H PRN    Or  morphINE sulfate (PF) 4 MG/ML injection 4 mg, 4 mg, Intravenous, Q2H PRN  magnesium oxide (MAG-OX) tab 400 mg, 400 mg, Oral, TID  acetaminophen (TYLENOL) tab 650 mg, 650 mg, Oral, Q4H PRN    Or  acetaminophen (TYLENOL) 650

## 2020-02-12 NOTE — PROGRESS NOTES
[facility] Hospitalist Progress Note     Nolen Lennox Patient Status:  Inpatient    1945 MRN [id number]   [facility] 7NE-A Attending Solange Meza MD   [facility] Day # 8 PCP Ernesto Redman MD     CC: follow up    SUBJECTIVE:  Doesn't feel Oral 2 times per day   • predniSONE  40 mg Oral Daily with breakfast   • cetirizine  10 mg Oral Daily   • Pantoprazole Sodium  20 mg Oral Q12H   • PEG 3350  17 g Oral Daily   • insulin detemir  14 Units Subcutaneous Nightly   • Clopidogrel Bisulfate  75 mg case  - echo reviewed - LVEF 40%, anterior septum, anterior wall, and apex akinetic. PASP mildly increased     # L occipital/parietal/frontal CVA  - ASA, plavix, statin  - neurointerventional consulted for severe L ICA stenosis.  At this point, medical mgmt with consultants     Teresa Hines DO  [place] Hospitalist  [phone]

## 2020-02-12 NOTE — PROGRESS NOTES
Alert and in decent spirits overall. No complaints of left foot discomfort.     Afebrile  773-570 systolic  80 regular    Lungs clear anteriorly  Ht RRR  abd soft  Ext left foot warm - still dusky appearance plantar surface at base of toes - sensation intac

## 2020-02-12 NOTE — CARDIAC REHAB
Stroke and MI/CAD education completed with pt and her daughter. Daughter expressing desire for her mom to do phase 2 CR. Explained patients have to be able to transfer themselves from W/C to equipment. Pt to go to a rehab center upon d/c.  Did not set up ph

## 2020-02-12 NOTE — CM/SW NOTE
Dr Francisco Matthews from [facility] recommending JANIYA because pt was refusing to work with PT; however, PT saw pt today and pt did well with PT. They talked with pt about consistency in working with PT. They will work with pt again tomorrow.   PT is still recommending acute

## 2020-02-12 NOTE — PLAN OF CARE
Assumed care of patient at 0700  A/OX1-2, 1-2L NC, NSR on tele  Patient very irritable, noncompliant w/ vitals and daily care  Patient was educated by Neuro MD and family on the importance and necessity for vitals, daily care, etc. Patient verbalized under

## 2020-02-12 NOTE — PLAN OF CARE
Assumed care at [address]. Pt a/ox1-2. VSS. NSR per tele. O2 at 1-2l/nc in use. Denies pain. Pt very irritable, noncompliant w/ daily care. Changed and repositioned per staff w/ daughter at bedside.   Pt does not want to be waken up for vitals and changing,

## 2020-02-13 NOTE — PROGRESS NOTES
[facility] Hospitalist Progress Note     Manisha Montez Patient Status:  Inpatient    1945 MRN [id number]   [facility] 7NE-A Attending Luis Angel Miller MD   [address] Day # 9 PCP Wm Truong MD     CC: follow up    SUBJECTIVE:  Doesn't feel HCl  100 mg Oral 2 times per day   • predniSONE  40 mg Oral Daily with breakfast   • cetirizine  10 mg Oral Daily   • PEG 3350  17 g Oral Daily   • insulin detemir  14 Units Subcutaneous Nightly   • Clopidogrel Bisulfate  75 mg Oral Daily   • aspirin EC  8 mildly increased     # L occipital/parietal/frontal CVA  - ASA, plavix, statin  - neurointerventional consulted for severe L ICA stenosis. At this point, medical mgmt is recommended. Repeat MRI brain did not show new CVA.   - BP goals per neuro/cards  - [facility]ui Fel[facility]  [phone]

## 2020-02-13 NOTE — PHYSICAL THERAPY NOTE
PHYSICAL THERAPY TREATMENT NOTE - INPATIENT    Room Number: 2240/5448-E   Session: 3  Number of Visits to Meet Established Goals: 5    Presenting Problem: pelvic mass s/p total hysterectomy on 2/4 post-op STEMI    History related to current admission:   P left eye   • OTHER      Pacemaker   • XI ROBOT-ASSISTED LAPAROSCOPIC HYSTERECTOMY Bilateral 2/4/2020    Performed by Juliane Tolentino MD at [address]  \"I am really tired, I was about to nap first.\"    Patient’s self-stated goal is uns provided for technique.    Bridging, x 10  Heel [id number]  Hip abdx10  LTR  SAQ  hooklying abduction unilateral and bilateral.    During bed mobility training, pt needed minimal assist but increased time with scooting and rolling due to difficulty planning functional mobility. Due to above deficits, Pt will benefit from continued IP PT, so that patient may achieve highest functional independence/return to baseline. Continue to recommend AR.         DISCHARGE RECOMMENDATIONS  PT Discharge Recommendations: Acu

## 2020-02-13 NOTE — PLAN OF CARE
Assumed care @ 1900. Patient alert oriented x4 forgetful at times  Neuro q 4h  On telemetry monitoring. Denies SOB, Denies any pain. Updated patient/family with plan of care, verbalizes understanding. Ensures patient is safe at all times.   Maintained a Progressing     Problem: CARDIOVASCULAR - ADULT  Goal: Maintains optimal cardiac output and hemodynamic stability  Description  INTERVENTIONS:  - Monitor vital signs, rhythm, and trends  - Monitor for bleeding, hypotension and signs of decreased cardiac ou

## 2020-02-13 NOTE — PLAN OF CARE
Worked well with PT and OT today. Did refuse bathing several times. This continues to be a constant. Although pt's caregiver did help to work with her today. Good appetite. Passing gas. No BM today. No c/o pain. Will cont to monitor.

## 2020-02-13 NOTE — PROGRESS NOTES
Up in chair. Feels okay. No specific complaints at present.     Afebrile  149/60  74 regular    HEENT: no rash or stridor  Lungs clear anteriorly  Ht RRR  abd soft  Ext left foot less dusky - warm - good sensation - no pain - baseline movement    36/1.2

## 2020-02-13 NOTE — SLP NOTE
SPEECH DAILY NOTE - INPATIENT    ASSESSMENT & PLAN   ASSESSMENT  Pt seen for dysphagia tx to assess tolerance with recommended diet, ensure proper utilization of aspiration precautions and provide pt/family education.   Pt found lying in bed alert and disag precautions and swallow strategies independently over 1-2 session(s).      In Progress   Goal #3 The patient will tolerate trial upgrade of MECHANICAL SOFT CHOPPED consistency and thin liquids without overt signs or symptoms of aspiration with 95 % accuracy

## 2020-02-13 NOTE — CONSULTS
[facility] Pharmacy Note:  Renal Dose Adjustment for Metoclopramide (REGLAN)    Kathleen Hay has been prescribed Metoclopramide (REGLAN) 10 mg every 8 hours as needed for N/V. Estimated Creatinine Clearance: 37.5 mL/min (A) (based on SCr of 1.17 mg/dL (H)).

## 2020-02-13 NOTE — PROGRESS NOTES
Patient alert x4, Family members @ the bedside. I spoke with the patient and 2 daughters personally. Patient and daughter requested not to wake her up in the middle of the night or in the morning to do vitals, assessment and labs.   They want it done in th

## 2020-02-13 NOTE — PLAN OF CARE
Pt is alert and oriented x4, LUE flaccid,   R facial droop, LLE weakness  Pt agreed to work with PT today  Per Dr. Armando Fleming, no AC, DAPT. [facility] to re-eval .    PT DAUGHTER ROMAN WOULD LIKE TO SPEAK TO DR. FU, NEURO, AND CARDS PRIOR TO PT BEING DC'D.  Alaletha Backbone

## 2020-02-13 NOTE — PROGRESS NOTES
1 [facility] [address] Follow Up     Sabine Lipscomb  [id number]  Hospital Day #9  Date of Consult: 02/07/20  Patient seen at: [facility]     Subjective:      Patient was seen and examined without family  at the bedside.    Aaron macias PRN  •  Zolpidem Tartrate (AMBIEN) tab 5 mg, 5 mg, Oral, Nightly PRN  •  Prochlorperazine Maleate (COMPAZINE) tab 10 mg, 10 mg, Oral, Q12H PRN **OR** prochlorperazine (COMPAZINE) rectal suppository 25 mg, 25 mg, Rectal, Q12H PRN  •  diphenhydrAMINE HCl (BE Intravenous, Q15 Min PRN **OR** glucose (DEX4) oral liquid 30 g, 30 g, Oral, Q15 Min PRN **OR** Glucose-Vitamin C (DEX-4) chewable tab 8 tablet, 8 tablet, Oral, Q15 Min PRN  •  gabapentin (NEURONTIN) cap 400 mg, 400 mg, Oral, Nightly  No current outpatient disease  Normal w/effort Full Normal or  Reduced Full   70 Reduced Some disease  Can’t perform job Full Normal or   Reduced Full   60 Reduced Significant disease  Can’t perform hobby Occasional  Assist Normal or   Reduced Full or confused   50 Mainly sit/l History of stroke    Acute ST elevation myocardial infarction (STEMI) involving right coronary artery (HCC)    CAD in native artery    Pure hypercholesterolemia    Palliative care encounter    Goals of care, counseling/discussion    ROLANDO (acute kidney injur

## 2020-02-14 NOTE — PROGRESS NOTES
Dr. Pratibha Granados, do you want us to communicate to pt or refer back to ordering MD Dr. Suleman Garcia? I was unable to locate any note confirming pt was notified.

## 2020-02-14 NOTE — PROGRESS NOTES
SUBJECTIVE: Tired after PT! . No acute pain. CC: Impaired ADL and mobility dysfuction due to Acute CVA following Hysterectomy  Interval History: On IV Heparin drip. Participating better in therapies.  Pelvic mass path appears benign  Scheduled Meds:  • He 131/51 97.7 °F (36.5 °C) Oral 74 18 100 %   02/13/20 2100 128/64 97.8 °F (36.6 °C) Oral 89 18 100 %   02/13/20 1700 — 97.4 °F (36.3 °C) Oral — 18 —       Intake/Output:    Intake/Output Summary (Last 24 hours) at 2/14/2020 1549  Last data filed at 2/14/202

## 2020-02-14 NOTE — PROGRESS NOTES
Okay at present.  Foot apparently developed some discoloration and abnormal sensation several hours after heparin discontinued - reviewed pictures taken by her daughter - heparin resumed and foot appears similar to when I saw it yesterday - no change in sen

## 2020-02-14 NOTE — PROGRESS NOTES
Came in last night after family concerned about foot  Heparin resumed   Unchanged and unchanged this morning  No signal, slightly cool    I discussed with family last night and patient last night and this morning, that we are choosing to prevent another st

## 2020-02-14 NOTE — PLAN OF CARE
Patient alert and oriented times four but forgetful. Meds crushed and given to the daughters to give the patient. Refused some medications (see MAR). Brief changed in evening but patient will not allow changing during the night.  Patient's daughters said th

## 2020-02-14 NOTE — PHYSICAL THERAPY NOTE
PHYSICAL THERAPY TREATMENT NOTE - INPATIENT    Room Number: 6610/7527-Y   Session: 4  Number of Visits to Meet Established Goals: 5    Presenting Problem: pelvic mass s/p total hysterectomy on 2/4 post-op STEMI    History related to current admission:   P left eye   • OTHER      Pacemaker   • XI ROBOT-ASSISTED LAPAROSCOPIC HYSTERECTOMY Bilateral 2/4/2020    Performed by Juliane Tolentino MD at [address]  \"I am really tired. \"    Patient’s self-stated goal is unstated at this time.      O cues provided for technique. Significant improvement in active participation.    Bridging, x 4  Heel [id number]  Hip abdx10  LTR  SAQ  hooklying abduction unilateral and bilateral.    During bed mobility training, pt needed minimal assist to roll side to hilton progressing with pre gait activities. Pt transferred to chair with max assist of one and max cues for technique.   Pt. presents with impaired sitting dynamic balance, impaired standing balance, impaired strength on L side, difficulty with gait/transfers res

## 2020-02-14 NOTE — CM/SW NOTE
Spoke with pt and her dtr Celia who is pt's HCPOA. Told dtr that pt will be re-evaluated for [facility]. Pt has been working consistently with PT/OT who is still recommending AR. If [facility] will not accept pt for AR, pt will go to [facility] for JANIYA.   Pt lives at [facility]

## 2020-02-14 NOTE — PROGRESS NOTES
Assumed care of patient at 07:00 am   Pt A/O x 4, forgetful at times. Pt refusing medications and medical care at times. Pt educated on importance of medications and medical care.   Patient tolerating medications crushed in patient's own ice cream, alfonso

## 2020-02-14 NOTE — PROGRESS NOTES
[facility] Hospitalist Progress Note     Opal Burrows Patient Status:  Inpatient    1945 MRN [id number]   [facility] 7NE-A Attending Jenny Grullon MD   Hosp Day # 10 PCP Jennifer Huynh MD     CC: follow up    S: no new complaints.  Gen Intravenous Once   • insulin detemir  16 Units Subcutaneous Nightly   • Insulin Aspart Pen  1-5 Units Subcutaneous TID CC and HS   • Insulin Aspart Pen  1-68 Units Subcutaneous TID CC   • omeprazole  20 mg Oral BID   • Labetalol HCl  100 mg Oral 2 times pe LCx disease  - CV surgery notified of case  - echo reviewed - LVEF 40%, anterior septum, anterior wall, and apex akinetic.  PASP mildly increased     # L occipital/parietal/frontal CVA  - ASA, plavix, statin  - neurointerventional consulted for severe L ICA

## 2020-02-14 NOTE — CM/SW NOTE
RX for Eliquis and Xarelto sent to [facility]. The cost for each drug is $350.00. Spoke with RN regarding the above.

## 2020-02-15 NOTE — PLAN OF CARE
Assumed care at 1900. Alert and oriented x4, very angry and upset. Telemetry reading SR. Heparin drip infusing assessed and maintained. Neuro checks remain unchanged. Abdominal lap sites closed with glue. Pulses by doppler.  Patient refusing to be turned an

## 2020-02-15 NOTE — PROGRESS NOTES
Seen and examined earlier today. No major issues overnight.     Afebrile  145/60  84 regular    Lungs clear anteriorly  Ht RRR  abd soft  Ext left foot warm - does not appear cyanotic - not really bothering her    A/P: Compensated CAD, cerebrovascular and p

## 2020-02-15 NOTE — PLAN OF CARE
Assumed care @5701  caregiver at bedside. VSS, A&Ox4, neuro's q4 with r sided weakness, facial droop and slurred speech. RA  NSR , Denies Pain, Up with total lift. Tolerating chopped diet.   Intake and outputs w/d/l.    4 lap site Incisions c/d/I, forest assistive devices as appropriate  - Consider OT/PT consult to assist with strengthening/mobility  - Encourage toileting schedule  Outcome: Progressing     Problem: DISCHARGE PLANNING  Goal: Discharge to home or other facility with appropriate resources  Jesse Johnson Delirium  Goal: Minimize duration of delirium  Description  Interventions:  - Encourage use of hearing aids, eye glasses  - Promote highest level of mobility daily  - Provide frequent reorientation  - Promote wakefulness i.e. lights on, blinds open  - Prom Problem: CARDIOVASCULAR - ADULT  Goal: Maintains optimal cardiac output and hemodynamic stability  Description  INTERVENTIONS:  - Monitor vital signs, rhythm, and trends  - Monitor for bleeding, hypotension and signs of decreased cardiac output  - Evalua

## 2020-02-15 NOTE — SLP NOTE
SPEECH DAILY NOTE - INPATIENT    ASSESSMENT & PLAN   ASSESSMENT  Attempted to see pt for cog/comm eval per stroke protocol. Pt reported that she is \"too sleepy\" and refusing at this time.   Richar De La Paz, the pt's caregiver was at bedside (she has worked with p questing if pt is fearful of swallowing solids. Poor po intake of purees.     Goal #4 The patient will utilize compensatory strategies as outlined by  BSSE (clinical evaluation) including Slow rate, Small bites, Small sips, No straws, Upright 90 degrees, S

## 2020-02-15 NOTE — PROGRESS NOTES
Vascular surgery progress note    Patient states that she has some minor pain on the bottom of the foot however this is stable to unchanged. She is currently tolerating therapeutic heparin drip. We will continue to monitor.   No plan for vascular interven

## 2020-02-16 NOTE — PLAN OF CARE
Assumed care @4004  caregiver at bedside. VSS, A&Ox4, neuro's q4 with r sided weakness, facial droop and slurred speech. RA  NSR , Denies Pain, Up with total lift. Tolerating chopped diet.   Intake and outputs w/d/l.    4 lap site Incisions c/d/I, forest Provide assistive devices as appropriate  - Consider OT/PT consult to assist with strengthening/mobility  - Encourage toileting schedule  Outcome: Progressing     Problem: DISCHARGE PLANNING  Goal: Discharge to home or other facility with appropriate resou Problem: Delirium  Goal: Minimize duration of delirium  Description  Interventions:  - Encourage use of hearing aids, eye glasses  - Promote highest level of mobility daily  - Provide frequent reorientation  - Promote wakefulness i.e. lights on, blinds o Progressing     Problem: CARDIOVASCULAR - ADULT  Goal: Maintains optimal cardiac output and hemodynamic stability  Description  INTERVENTIONS:  - Monitor vital signs, rhythm, and trends  - Monitor for bleeding, hypotension and signs of decreased cardiac ou

## 2020-02-16 NOTE — PROGRESS NOTES
[facility] Hospitalist Progress Note     Nancy Vazquez Patient Status:  Inpatient    1945 MRN [id number]   [facility] 7NE-A Attending Tameal Olguin MD   [facility] Day # 6 PCP Alyssa De La Cruz MD     CC: follow up    S: laying in bed, feels e 2.5 mg Oral BID   • Heparin Sodium (Porcine)  30 Units/kg Intravenous Once   • insulin detemir  16 Units Subcutaneous Nightly   • Insulin Aspart Pen  4-20 Units Subcutaneous TID CC and HS   • omeprazole  20 mg Oral BID   • Labetalol HCl  100 mg Oral 2 time moderate-severe LCx disease  - CV surgery notified of case  - echo reviewed - LVEF 40%, anterior septum, anterior wall, and apex akinetic.  PASP mildly increased     # L occipital/parietal/frontal CVA  - ASA, plavix, statin  - neurointerventional consulted previous progress notes.  D/w RN MD Yevgeniy Reece  [phone]  2/15/2020  6:42 PM

## 2020-02-16 NOTE — DIETARY NOTE
[address] [place] [address]     Admitting diagnosis:  PELVIC MASS  Pelvic mass  History of stroke    Ht: 162.6 cm (5' 4\")  Wt: 82.9 kg (182 lb 12.2 oz). This is 152 % of IBW  Body mass index is 31.36 kg/m².   IBW: 54.5 kg    Labs

## 2020-02-16 NOTE — PROGRESS NOTES
[facility] Hospitalist Progress Note     Olegario Cogan Patient Status:  Inpatient    1945 MRN [id number]   [facility] 7NE-A Attending Faustine Buerger, MD   [facility] Day # 12 PCP Claudine Cantu MD     CC: follow up    S: Pain in feet. On 2L.  E (Porcine)  30 Units/kg Intravenous Once   • insulin detemir  16 Units Subcutaneous Nightly   • Insulin Aspart Pen  4-20 Units Subcutaneous TID CC and HS   • omeprazole  20 mg Oral BID   • Labetalol HCl  100 mg Oral 2 times per day   • cetirizine  10 mg Ora wall, and apex akinetic. PASP mildly increased     # L occipital/parietal/frontal CVA  - ASA, plavix, statin  - neurointerventional consulted for severe L ICA stenosis. At this point, medical mgmt is recommended. Repeat MRI brain did not show new CVA.   - B

## 2020-02-16 NOTE — PROGRESS NOTES
[facility]  Progress Note    Ravinder Mims Patient Status:  Inpatient    1945 MRN [id number]   [facility] 7NE-A Attending Tami Foley MD   [facility] Day # 12 PCP Jacquie Valiente MD       Assessment and Plan:  Patient Active Probl just on Plavix alone. Vascular surgery is following  We will monitor on telemetry and check follow-up blood work  Continue supportive care. Subjective:  No chest pain or shortness of breath.     Objective:  /46 (BP Location: Left arm)   Pulse 73 (NOVOLOG) 100 UNIT/ML flexpen 4-20 Units, 4-20 Units, Subcutaneous, TID CC and HS  omeprazole (PRILOSEC) 2mg/ml suspension 20 mg, 20 mg, Oral, BID  Metoclopramide HCl (REGLAN) injection 5 mg, 5 mg, Intravenous, Q8H PRN  Labetalol HCl (NORMODYNE) tab 100 mg Min PRN  Senna (SENOKOT) tab 17.2 mg, 17.2 mg, Oral, Nightly  docusate sodium (COLACE) cap 100 mg, 100 mg, Oral, BID  magnesium hydroxide (MILK OF MAGNESIA) 400 MG/5ML suspension 30 mL, 30 mL, Oral, Daily PRN  bisacodyl (DULCOLAX) rectal suppository 10 mg,

## 2020-02-16 NOTE — PLAN OF CARE
Assumed care at 1900. Alert and oriented x4, very agitated. Telemetry monitor reading SR. Heparin stopped. Eliquis started.  Spoke with daughter about patient refusing to be changed until care giver comes in am, daughter stated that if she wants to wait unt

## 2020-02-17 NOTE — PROGRESS NOTES
[facility] Hospitalist Progress Note     Abhay Payment Patient Status:  Inpatient    1945 MRN [id number]   [facility] 7NE-A Attending Max Colon MD   [facility] Day # 15 PCP Elvin Peter MD     CC: follow up    S: Pain in feet. On 3-4L. to 2/7/2020 chest radiograph.     Dictated by: Sherley Lyon MD on 2/17/2020 at 13:44     Approved by: Sherley Lyon MD on 2/17/2020 at 13:45            Meds:   Scheduled Medication:  • apixaban  2.5 mg Oral BID   • insulin detemir  16 Units Subcutaneous Nightly moderate-severe LCx disease  - CV surgery notified of case  - echo reviewed - LVEF 40%, anterior septum, anterior wall, and apex akinetic.  PASP mildly increased     # L occipital/parietal/frontal CVA  - ASA, plavix, statin  - neurointerventional consulted Shoulder pain  -o/p injection     Dispo:   Acute rehab upon dc, follow bernardino      D/w RN Faith Nyhan and pt/family/friend    MD Yevgeniy Hogue  [phone]  2/17/2020  5:57 PM

## 2020-02-17 NOTE — PHYSICAL THERAPY NOTE
PHYSICAL THERAPY TREATMENT NOTE - INPATIENT    Room Number: 8117/0377-O   Session: 5  Number of Visits to Meet Established Goals: 5    Presenting Problem: pelvic mass s/p total hysterectomy on 2/4 post-op STEMI    History related to current admission:   P left eye   • OTHER      Pacemaker   • XI ROBOT-ASSISTED LAPAROSCOPIC HYSTERECTOMY Bilateral 2/4/2020    Performed by Humberto De La Paz MD at [address]  \"I am really tired. \"    Patient’s self-stated goal is unstated at this time.      O Heel [id number]  Hip [id number]  LTR  SAQ      During bed mobility, pt needed mod assist to roll side to side, pt reported of stiffness. Repeated four times each way to improve function.    Pt performed supine to sit with cues for technique and max assist of transfers Sit to/from Stand at assistance level: moderate assistance  Met on 2/12/2020  Updated to transfer from sit to stand with min assist of one from chair and bed.         Goal #3 Pt will be able to transfer from bed to wheelchair with minimal assista

## 2020-02-17 NOTE — CM/SW NOTE
Xarelto will Eliquis will both cost patient $381 a month. Pt can use cost saving card on either medication to get one month free but cost will then revert to the $381. RN updated on above and she will inform cardiology during rounds.     Jeniffer Cason, MSN

## 2020-02-17 NOTE — PLAN OF CARE
Assumed care at 1900. Alert and oriented x4, very angry. Telemetry monitor reading SR. Lap sites closed with glue. Patient refusing Labetalol stating her BP is low for her.  Educated patient on the importance of keeping clean and dry to prevent skin breakdo

## 2020-02-17 NOTE — PROGRESS NOTES
[facility]  Cardiology Critical Care Progress Note    Keisha Hill Patient Status:  Inpatient    1945 MRN [id number]   [facility] 7NE-A Attending Gelacio Vogt MD   [address] Day # 15 PCP Yung Vargas MD       Subjective:  Tell Oral Daily   • PEG 3350  17 g Oral Daily   • Clopidogrel Bisulfate  75 mg Oral Daily   • aspirin EC  81 mg Oral Daily    Or   • aspirin  300 mg Rectal Daily   • Diclofenac Sodium   Topical BID   • Normal Saline Flush  10 mL Intravenous Q12H   • magnesium o

## 2020-02-17 NOTE — PROGRESS NOTES
I called Dr. Ruth Krueger office and spoke to Shayne. Pt has not yet received results as she has not scheduled post-op appt. She should be seen this week.  She said she will call pt to schedule post-op and results will be shared with pt by Dr. William De La Cruz at that v

## 2020-02-17 NOTE — PLAN OF CARE
Pt with temps, leukocytosis, sinus tach. Reviewed with Dr Blaine Locke. Pan cultured- midline to be dc'd. ID to see.

## 2020-02-17 NOTE — PROGRESS NOTES
Seen and examined with Dr. Elizabeth Spencer at the bedside.     Overall doing okay - working with PT    Left foot with some discomfort - not severe - no change in sensation/movement - slight discoloration versus the right with punctate lesion medial aspect of nailbed

## 2020-02-17 NOTE — CONSULTS
INFECTIOUS DISEASE CONSULTATION    Sabine Lipscomb Patient Status:  Inpatient    1945 MRN [id number]   [facility] 7NE-A Attending Raphael Nguyen MD   [address] Day # 15 PCP Alina Del Angel She reports that she does not use drugs. Allergies:    Baclofen                OTHER (SEE COMMENTS)    Comment:weakness  Radiology Contrast *    OTHER (SEE COMMENTS)    Comment:Lower lip swelling.  Consider pre-treatment if             needed  Lipitor HYDROcodone-acetaminophen (NORCO) 5-325 MG per tab 1 tablet, 1 tablet, Oral, Q4H PRN **OR** HYDROcodone-acetaminophen (NORCO) 5-325 MG per tab 2 tablet, 2 tablet, Oral, Q4H PRN  •  morphINE sulfate (PF) 4 MG/ML injection 1 mg, 1 mg, Intravenous, Q2H PRN ** 400 MG Oral Tab, Take 1 tablet (400 mg total) by mouth 3 (three) times daily. , Disp: 270 tablet, Rfl: 0  ergocalciferol 54323 units Oral Cap, Take 1 capsule (50,000 Units total) by mouth once a week.  (Patient taking differently: Take 50,000 Units by mouth sounds. Musculoskeletal: Full range of motion of all extremities. No swelling noted. Joints: no effusions  Skin: No lesions.  No erythema, no open wounds      Laboratory Data:  Laboratory data reviewed      Recent Labs   Lab 02/17/20  1205   RBC 3.19* Postop STEMI/and CVA  3. Leukocytosis/wbc increased today from baseline - unclear if this is due to infection vas other reactive process, recent bleed etc  Had temp max 100.8 today, repeat temp normal  -abdomen benign exam, UA negative  Blood cx sent, hemo

## 2020-02-17 NOTE — CM/SW NOTE
Pt is ready for d/c today to . However, Issa German at [place] [address] said that there are no beds today at [place] [address]. Pt will be on the priority list for  tomorrow. Dtr Sandhya Gilmore updated. Referral made to [facility] - PC order in.

## 2020-02-17 NOTE — PLAN OF CARE
Assumed care of pt @ 4173. Pt A/Ox4, VSS, pt angry and refusing meds this AM. Pt also continues to refusing to be changed. Low grade temp, cxr, UA, and midline pulled. New IV started, straight cath done with 750ml of nadeem urine.  Up with 2 max assist. Pt b toileting schedule  Outcome: Progressing     Problem: DISCHARGE PLANNING  Goal: Discharge to home or other facility with appropriate resources  Description  INTERVENTIONS:  - Identify barriers to discharge w/pt and caregiver  - Include patient/family/disch glasses  - Promote highest level of mobility daily  - Provide frequent reorientation  - Promote wakefulness i.e. lights on, blinds open  - Promote sleep, encourage patient's normal rest cycle i.e. lights off, TV off, minimize noise and interruptions  - Enc stability  Description  INTERVENTIONS:  - Monitor vital signs, rhythm, and trends  - Monitor for bleeding, hypotension and signs of decreased cardiac output  - Evaluate effectiveness of vasoactive medications to optimize hemodynamic stability  - Monitor ar

## 2020-02-18 NOTE — SLP NOTE
SPEECH DAILY NOTE - INPATIENT    ASSESSMENT & PLAN   ASSESSMENT  Pt seen this morning for meal monitor to ensure toleration of recommended diet and education re: aspiration precautions/compensatory strategies. Nurse approved this session.  No family was pre session (2/18/2020).    Goal #4 The patient will utilize compensatory strategies as outlined by  BSSE (clinical evaluation) including Slow rate, Small bites, Small sips, Upright 90 degrees, Supervision with meals with mod assistance 95 % of the time across

## 2020-02-18 NOTE — PLAN OF CARE
Assumed pt care @ 1930. Pt c/o nausea, requesting IV Zofran. After IV Zofran, pt refusing to take any other meds. Discussed importance of taking Eliquis, pt still refusing. Informed pt this RN will return later   Per PCT, pt refused BP to be taken.      C physical deficits and behaviors that affect risk of falls.   - [place] fall precautions as indicated by assessment.  - Educate pt/family on patient safety including physical limitations  - Instruct pt to call for assistance with activity based on assessme and airway patency with patient fatigue and changes in neurological status  - Encourage and assist patient to increase activity and self care with guidance from PT/OT  - Encourage visually impaired, hearing impaired and aphasic patients to use assistive/co sitting position while performing ADLs such as feeding, grooming, and bathing  - Educate and encourage patient/family in tolerated functional activity level and precautions during self-care  - Encourage patient to incorporate impaired side during daily act for trends  - Administer supportive blood products/factors, fluids and medications as ordered and appropriate  - Administer supportive blood products/factors as ordered and appropriate  Outcome: Progressing  Goal: Free from bleeding injury  Description  (E

## 2020-02-18 NOTE — SLP NOTE
SPEECH/LANGUAGE/COGNITIVE EVALUATION - INPATIENT    Admission Date: 2/3/2020  Evaluation Date: 2/18/2020    Reason for Referral: Stroke protocol    ASSESSMENT & PLAN   ASSESSMENT & IMPRESSION  Pt seen this morning for cognitive-communication screening per and swallow strategies independently over 1-2 session(s).   In Progress   Goal #3 The patient will tolerate trial upgrade of MECHANICAL SOFT CHOPPED consistency and thin liquids without overt signs or symptoms of aspiration with 95 % accuracy over 2 session have any questions please contact Page Frederick

## 2020-02-18 NOTE — PROGRESS NOTES
[facility]                INFECTIOUS DISEASE PROGRESS NOTE    Bettyjane Schirmer Patient Status:  Inpatient    1945 MRN [id number]   [facility] 7NE-A Attending Fior Junior MD   [facility] Day # 14 PCP MD Jayro Yepez insulin (HCC)     Anxiety disorder     Adjustment disorder with depressed mood     Avoidant personality disorder (HCC)     Left hemiparesis (HCC)     Hyperlipidemia     Non compliance w medication regimen     Rotator cuff syndrome of right shoulder     Imp

## 2020-02-18 NOTE — PROGRESS NOTES
[facility]  Cardiology Progress Note    Juan M Stewart Patient Status:  Inpatient    1945 MRN [id number]   [facility] 7NE-A Attending Flavio Lance MD   [facility] Day # 15 PCP Christal De Leon MD     Subjective:  Denies chest pain or chloride 50 mL/hr at 02/18/20 0608       Assessment:  · Pelvis mass, s/p RADHA/SBP 2/34/20 - fibroma/leiomyoma per pathology. Vaginal bleeding noted this morning. Eliquis discontinued and OB notified.    · Iesha op acute MI with both Anterior and Inferior ST e

## 2020-02-18 NOTE — PROGRESS NOTES
Called by PCTs to pt's room. Moderate to heavy blood noted on diaper. Source of bleeding is from vagina. Called and informed Dr. Pedrito Lott regarding pt status, Eliquis d/rob by Dr. Pedrito Lott. Per Dr. Pedrito Lott, inform OB regarding this as well.      Endorsed to A

## 2020-02-18 NOTE — PLAN OF CARE
Problem: Impaired Cognition  Goal: Patient will exhibit improved attention, thought processing and/or memory  Description  Interventions:  - Repeat orally presented information, as necessary  - Write down important information (i.e. times) to help the pa

## 2020-02-18 NOTE — PLAN OF CARE
Assumed care of pt @ 0730. Per PCT and night Rn pt had increase vaginally bleeding this AM, Dr Zenaida Hill and Dr Sadie Colon notified. Per Dr Sadie Colon hold eliquis for 2 days then can restart. Dr Zenaida Hill dc'd eliquis for now.  This afternoon around 1330 pt had very hea affect risk of falls.   - [place] fall precautions as indicated by assessment.  - Educate pt/family on patient safety including physical limitations  - Instruct pt to call for assistance with activity based on assessment  - Modify environment to reduce ri fatigue and changes in neurological status  - Encourage and assist patient to increase activity and self care with guidance from PT/OT  - Encourage visually impaired, hearing impaired and aphasic patients to use assistive/communication devices  Outcome: Pr feeding, grooming, and bathing  - Educate and encourage patient/family in tolerated functional activity level and precautions during self-care  - Encourage patient to incorporate impaired side during daily activities to promote function   Outcome: Progress products/factors, fluids and medications as ordered and appropriate  - Administer supportive blood products/factors as ordered and appropriate  Outcome: Progressing  Goal: Free from bleeding injury  Description  (Example usage: patient with low platelets)

## 2020-02-18 NOTE — PROGRESS NOTES
[address] Hospitalist Progress Note     Minus Travis Patient Status:  Inpatient    1945 MRN [id number]   [facility] 7NE-A Attending Jason Renteria MD   [facility] Day # 15 PCP Ahsan Albrecht MD     CC: follow up    S: sitting in chair.  133/ detemir  16 Units Subcutaneous Nightly   • Insulin Aspart Pen  4-20 Units Subcutaneous TID CC and HS   • omeprazole  20 mg Oral BID   • cetirizine  10 mg Oral Daily   • PEG 3350  17 g Oral Daily   • Clopidogrel Bisulfate  75 mg Oral Daily   • aspirin EC  8 neurointerventional consulted for severe L ICA stenosis. At this point, medical mgmt is recommended. Repeat MRI brain did not show new CVA.   - BP goals per neuro/cards  - PT/OT/SLP     # Hx CVA with residual L sided hemiparesis  - see mgmt of acute CVA abo dc, follow temps and bleeding      D/w RN Clifton Geller and pt/caregiver    Vivien Sales MD  [facility] Hospitalist  [phone]  2/18/2020  4:52 PM    Addendum  -dtr concerned about lip swelling and angioedema. Happened 2/10-2/11 as well.  Will give benadryl IV, p

## 2020-02-19 NOTE — OCCUPATIONAL THERAPY NOTE
OCCUPATIONAL THERAPY TREATMENT NOTE - INPATIENT     Room Number: 8457/3214-N  Session: 2   Number of Visits to Meet Established Goals: 5    Presenting Problem: CVA    History related to current admission: Pt is 69year old female admitted on 2/3/2020.  Pt i ROBOT-ASSISTED LAPAROSCOPIC HYSTERECTOMY Bilateral 2/4/2020    Performed by Erin Alexandra MD at [address]  \"I am depressed, but I don't want to do anything about it. \"        OBJECTIVE  Precautions: (-180)    WEIGHT BEARING R and wrist self-range AAROM using R hand, 10 reps each. Completed PHQ 9, but pt did not want this OT to enter the numbers in the chart. Pt commented, \"I am depressed and I know it, but I do not want people coming in here to talk about it.   I won't take a not want the scores to be recorded.

## 2020-02-19 NOTE — PROGRESS NOTES
Had vaginal bleeding this morning  Will stop eliquis  Only option antiplastelet therapy  Foot unchanged.    As stated - choosing brain over limb given her stroke risk

## 2020-02-19 NOTE — PLAN OF CARE
Assumed care @ 0700  SpO2 >92% on 2L NC. Pt refused to wean to RA, states she wants NC for the humidified O2. Weaned to 1L  LLE cool to touch. Bruising to foot  abd incisions STEVIE with skin glue  Bright red vaginal bleeding present. No clots noted.  Dr. Pam Aguillon

## 2020-02-19 NOTE — PROGRESS NOTES
[facility]                INFECTIOUS DISEASE PROGRESS NOTE    Kev Fritz Patient Status:  Inpatient    1945 MRN [id number]   [facility] 7NE-A Attending Erin Alexandra MD   [facility] Day # 15 PCP MD Cindy Jang due to thrombosis of precerebral artery (HCC)     Type 2 diabetes mellitus with complication, without long-term current use of insulin (HCC)     Anxiety disorder     Adjustment disorder with depressed mood     Avoidant personality disorder (HCC)     Left h

## 2020-02-19 NOTE — PROGRESS NOTES
[facility]  Cardiology Progress Note    Kev Fritz Patient Status:  Inpatient    1945 MRN [id number]   [facility] 7NE-A Attending Erin Alexandra MD   [facility] Day # 15 PCP Debra Borges MD     Subjective:  Complains of left le bleeding continues,  Eliquis discontinued yesterday.    · Iesha op acute MI with both Anterior and Inferior ST elevation s/p PCI/KISHA of the RCA (culprit vessel with collaterals to subtotal LAD)- EF 40% - on ASA/Plavix  · Iesha op Left occipital, parietal, fro

## 2020-02-19 NOTE — PROGRESS NOTES
Pharmacy note: Duplicate Proton Pump Inhibitor with Histamine 2 blocker. Maura Boo is a 69year old female who has been prescribed both Famotidine and Omeprazole.   Pepcid was discontinued per P&T approved protocol for duplicate therapy in adult pat

## 2020-02-19 NOTE — PLAN OF CARE
Patient is alert and oriented, flat affect. Reports being sweaty at times. Afebrile. Refusing to have linens changed. Patient refusing to be repositioned throughout shift. Declined to have brief changed during 0100 vital signs. Eliquis remains on hold.  Con needs  - Identify cognitive and physical deficits and behaviors that affect risk of falls.   - [place] fall precautions as indicated by assessment.  - Educate pt/family on patient safety including physical limitations  - Instruct pt to call for assistance

## 2020-02-19 NOTE — PHYSICAL THERAPY NOTE
PHYSICAL THERAPY TREATMENT NOTE - INPATIENT    Room Number: 4680/1325-M   Session: 6/10  Number of Visits to Meet Established Goals: 10(5 sessions added.)    Presenting Problem: pelvic mass s/p total hysterectomy on 2/4 post-op STEMI    History related to • CATARACT      left eye   • OTHER      Pacemaker   • XI ROBOT-ASSISTED LAPAROSCOPIC HYSTERECTOMY Bilateral 2/4/2020    Performed by Micah Blackburn MD at [address]  \"I am really tired and My white blood cells are up.   \"    Kathy Crockett performed le ex in supine to improve strength and flexibility in michelle le's. Pt needed assist to complete ex with LLE, cues provided for technique.      Heel [id number]  Hip abdx10  LTR  SAQ all with assist.       During bed mobility, pt needed mod assist to Treatment Plan: Bed mobility; Endurance; Patient education;Gait training;Strengthening;Stair training;Transfer training;Balance training  Rehab Potential : Fair  Frequency (Obs): 3-5x/week    CURRENT GOALS   Goal #1 Patient is able to demonstrate supine - si

## 2020-02-19 NOTE — CM/SW NOTE
Care Progression Note:  Active Acute Medical Issue: hysterectomy 2/4. MI and CVA post-op. Other Contributing Medical Factors/Dx. : Vaginal bleeding. Eliquis on hold. low BP - pt is refusing meds, refusing nursing cares.    Length of stay: 15  Avoidable D

## 2020-02-20 NOTE — PROGRESS NOTES
[facility]                INFECTIOUS DISEASE PROGRESS NOTE    Kev Fritz Patient Status:  Inpatient    1945 MRN [id number]   [facility] 7NE-A Attending Erin Alexandra MD   [facility] Day # 16 PCP MD Cindy Jang thrombosis of precerebral artery (HCC)     Type 2 diabetes mellitus with complication, without long-term current use of insulin (HCC)     Anxiety disorder     Adjustment disorder with depressed mood     Avoidant personality disorder (HCC)     Left hemipare

## 2020-02-20 NOTE — CM/SW NOTE
Care Progression Note:  Active Acute Medical Issue: Robotic-assisted hysterectomy, bilateral salpingo-oophorectomy on 2/4. +MI and +CVA postop  Other Contributing Medical Factors/Dx. : Vaginal bleeding since 2/18.  Anticoagulation held for 2 days per Dr Martin Bras

## 2020-02-20 NOTE — PLAN OF CARE
Assumed care at [address] [place] [address]. C/o generalized pain. Denied need for medication at this time. LLE cool with bruising. LLE pulses absent. RLE pulses (+) per doppler. Abd incisions STEVIE with skin glue  Patient agreeable to being changed this evening.  Bright red va injury  Description  INTERVENTIONS:  - Assess pt frequently for physical needs  - Identify cognitive and physical deficits and behaviors that affect risk of falls.   - [place] fall precautions as indicated by assessment.  - Educate pt/family on patient sa Progressing  Goal: Achieves maximal functionality and self care  Description  INTERVENTIONS  - Monitor swallowing and airway patency with patient fatigue and changes in neurological status  - Encourage and assist patient to increase activity and self care care  Description  Interventions:  - Assess ability and encourage patient to participate in ADLs to maximize function  - Promote sitting position while performing ADLs such as feeding, grooming, and bathing  - Educate and encourage patient/family in [place] stability  Description  INTERVENTIONS  - Assess for signs and symptoms of bleeding or hemorrhage  - Monitor labs and vital signs for trends  - Administer supportive blood products/factors, fluids and medications as ordered and appropriate  - Administer sup

## 2020-02-20 NOTE — PROGRESS NOTES
[facility] Hospitalist Progress Note     Jocykb Rangel Patient Status:  Inpatient    1945 MRN [id number]   [facility] 7NE-A Attending Petra Wyatt MD   [facility] Day # 13 PCP Evonne Fonseca MD     CC: follow up    S: sitting in chair.  Feel aspirin  81 mg Oral Daily    Or   • aspirin  300 mg Rectal Daily   • Rosuvastatin Calcium  20 mg Oral Daily   • Labetalol HCl  50 mg Oral 2 times per day   • insulin detemir  16 Units Subcutaneous Nightly   • Insulin Aspart Pen  4-20 Units Subcutaneous TID akinetic. PASP mildly increased     # L occipital/parietal/frontal CVA  - ASA, plavix, statin  - neurointerventional consulted for severe L ICA stenosis. At this point, medical mgmt is recommended. Repeat MRI brain did not show new CVA.   - BP goals per tereza allergist to review labs.    - contrast listed as possible allergy in chart with consider pre-treatment prior to future contrast studies  -given dtr concerns and recurrence, will see if allergist can see in house  -given solumedrol again 2/18 and start pred

## 2020-02-20 NOTE — SLP NOTE
SPEECH DAILY NOTE - INPATIENT    ASSESSMENT & PLAN   ASSESSMENT  Pt seen for dysphagia tx to assess tolerance with recommended diet, ensure proper utilization of aspiration precautions and provide pt/family education.  Patient seen with recommended diet of signs or symptoms of aspiration with 95 % accuracy over 2 session(s).  Met   Goal #4 The patient will utilize compensatory strategies as outlined by  BSSE (clinical evaluation) including Slow rate, Small bites, Small sips, Upright 90 degrees, Supervision wi

## 2020-02-20 NOTE — PLAN OF CARE
Assumed care at 0730  Patient alert and oriented x4  Complaints of pain. Denied need for medication  LLE cool with bruising. Pulses absent  RLE pulses per doppler  No lip edema. Prednisone given  Vaginal bleeding. Dr Marcus Lara notified.  Will wait and see if b

## 2020-02-20 NOTE — PROGRESS NOTES
[facility]  Cardiology Progress Note    Osiris Gallegos Patient Status:  Inpatient    1945 MRN [id number]   [facility] 7NE-A Attending Juliane Tolentino MD   [facility] Day # 12 PCP Norman Trevizo MD     Subjective:  Slept well last nigh Sodium   Topical BID   • magnesium oxide  400 mg Oral TID   • Senna  17.2 mg Oral Nightly   • docusate sodium  100 mg Oral BID   • gabapentin  400 mg Oral Nightly         Assessment:  · Pelvis mass, s/p RADHA/SBP 2/34/20 - fibroma/leiomyoma per pathology.  Shelley Bray

## 2020-02-20 NOTE — PROGRESS NOTES
No complaints  Foot unchanged  Having vaginal bleeding  Recommend consulting gynecology   Re-iterated to patient choosing brain over extremity in not further evaluating

## 2020-02-20 NOTE — PROGRESS NOTES
[facility] Hospitalist Progress Note     Zurimanuel Savageirmer Patient Status:  Inpatient    1945 MRN [id number]   [facility] 7NE-A Attending Fior Junior MD   [facility] Day # 12 PCP Mayela Amos MD     CC: follow up    S: Pt seen and examined. Imaging:        Meds:   Scheduled Medication:  • aspirin  81 mg Oral Daily    Or   • aspirin  300 mg Rectal Daily   • insulin detemir  20 Units Subcutaneous Nightly   • predniSONE  40 mg Oral Daily with breakfast   • Rosuvastatin Calcium  20 mg Oral CV surgery notified of case  - echo reviewed - LVEF 40%, anterior septum, anterior wall, and apex akinetic. PASP mildly increased      # L occipital/parietal/frontal CVA  - ASA, plavix, statin  - neurointerventional consulted for severe L ICA stenosis.  At call. States reasonable to check C2, C4, C1 esterase panel (total +functional). Will prefer patient for OP follow up with allergist to review labs.    - contrast listed as possible allergy in chart with consider pre-treatment prior to future contrast studie

## 2020-02-21 NOTE — CONSULTS
[facility]  Report of Consultation    Minus Travis Patient Status:  Inpatient    1945 MRN [id number]   [facility] 7NE-A Attending Jason Renteria MD   [facility] Day # 16 PCP Ahsan Albrecht MD     Date of Admission:  2/3/2020  Date alcohol use. She reports that she does not use drugs. Allergies:    Baclofen                OTHER (SEE COMMENTS)    Comment:weakness  Radiology Contrast *    OTHER (SEE COMMENTS)    Comment:Lower lip swelling.  Consider pre-treatment if             neede diphenhydrAMINE HCl (BENADRYL) injection 12.5 mg, 12.5 mg, Intravenous, Q4H PRN  •  acetaminophen (TYLENOL) tab 650 mg, 650 mg, Oral, Q4H PRN **OR** HYDROcodone-acetaminophen (NORCO) 5-325 MG per tab 1 tablet, 1 tablet, Oral, Q4H PRN **OR** HYDROcodone-ace (82.9 kg), SpO2 95 %,   Irritable upon being awoken from sleep. No evidence of lip edema.     Laboratory Data:  Lab Results   Component Value Date    WBC 13.5 02/20/2020    HGB 7.8 02/20/2020    HCT 24.4 02/20/2020    .0 02/20/2020    CREATSERUM 1.4 antihistamines. Can increase zyrtec to 10mg bid if sxs recur. If reported swelling recurs, can send pictures via AlertMD to allergist on call. If confirmed AE, would check C1q to assess for AAE.     [address] [place] [address] [address]  2/21/2020  7:46 AM

## 2020-02-21 NOTE — CM/SW NOTE
Dr Nga Arreguin saw pt today and is now recommending [facility] and not [facility]. Pt and dtr Celia aware. Dtr wants someone from [place] [address] to call her to explain why pt cannot go to [facility]. Zuri from [place] [address] will call pt's dtr to explain why Dr Nga Arreguin is now recommending [facility].   Emailed d

## 2020-02-21 NOTE — DIETARY NOTE
[address] [place] [address]     Admitting diagnosis:  PELVIC MASS  Pelvic mass  History of stroke    Ht: 162.6 cm (5' 4\")  Wt: 82.9 kg (182 lb 12.2 oz). This is 152 % of IBW  Body mass index is 31.36 kg/m².   IBW: 54.5 kg    Labs

## 2020-02-21 NOTE — OCCUPATIONAL THERAPY NOTE
OCCUPATIONAL THERAPY TREATMENT NOTE - INPATIENT     Room Number: 2788/2181-G  Session: 3   Number of Visits to Meet Established Goals: 5    Presenting Problem: CVA    History related to current admission: Pt is 69year old female admitted on 2/3/2020.  Pt i ROBOT-ASSISTED LAPAROSCOPIC HYSTERECTOMY Bilateral 2/4/2020    [facility]  \"I just don't think I can. \"  When encouraging to take steps.     Patient self-stated goal is     OBJECTIVE  Precautions: (SBP was left with her caregiver. Patient End of Session: Up in chair;Needs met;Call light within reach; All patient questions and concerns addressed; Other (Comment)    ASSESSMENT   Pt continues to work toward OT goals. Improved sitting endurance.   Current l

## 2020-02-21 NOTE — PLAN OF CARE
Reached out to Dr. Barrera How per Dr. Jayden Ko request. Updated him that pt still having vaginal bleeding. Has had two wet briefs since this AM. Per patient report she feels as if the bleeding is slowing down. Per dr. Holly Jimenes, continue to monitor.  He feels as if

## 2020-02-21 NOTE — PROGRESS NOTES
[facility] Hospitalist Progress Note     Nancy Vazquez Patient Status:  Inpatient    1945 MRN [id number]   [facility] 7NE-A Attending Tamela Olguin MD   [facility] Day # 16 PCP Alyssa De La Cruz MD     CC: follow up    S: Pt seen and examined. detemir  15 Units Subcutaneous Nightly   • aspirin  81 mg Oral Daily    Or   • aspirin  300 mg Rectal Daily   • predniSONE  40 mg Oral Daily with breakfast   • Rosuvastatin Calcium  20 mg Oral Daily   • Labetalol HCl  50 mg Oral 2 times per day   • Insulin moderate LM stenosis,  of LAD and moderate-severe LCx disease  - CV surgery notified of case  - echo reviewed - LVEF 40%, anterior septum, anterior wall, and apex akinetic.  PASP mildly increased      # L occipital/parietal/frontal CVA  - ASA, plavix, st about hereditary angioedema (pt's grandchild had episode last year). D/w allergist on call. States reasonable to check C2, C4, C1 esterase panel (total +functional). Will prefer patient for OP follow up with allergist to review labs.    - contrast listed as

## 2020-02-21 NOTE — PHYSICAL THERAPY NOTE
PHYSICAL THERAPY TREATMENT NOTE - INPATIENT    Room Number: 8701/2035-D   Session: 7/10  Number of Visits to Meet Established Goals: 10(5 sessions added.)    Presenting Problem: pelvic mass s/p total hysterectomy on 2/4 post-op STEMI    History related to • CATARACT      left eye   • OTHER      Pacemaker   • XI ROBOT-ASSISTED LAPAROSCOPIC HYSTERECTOMY Bilateral 2/4/2020    Performed by Jenny Grullon MD at [address]  \"I am really tired and My foot hurts.   \"    Patient’s self-state ambulate. Pt performed le ex in supine to improve strength and flexibility in michelle le's. Pt needed assist to complete ex with LLE, cues provided for technique.      Heel [id number]  Hip [id number]  LTR  SAQ all with assist.   During bed mobility, pt needed Due to above deficits, Pt will benefit from continued IP PT, so that patient may achieve highest functional independence/return to baseline. Continue to recommend [facility] .         DISCHARGE RECOMMENDATIONS  PT Discharge Recommendations: Acute rehabilitation

## 2020-02-21 NOTE — PLAN OF CARE
Assumed care at 0730  Patient A&Ox4  NSR on tele  Had 7 beats of vtach today, was asymptomatic  Notified Ames Dakins APN, order to check mag level  SBP kept between 120-180 per order  Pills given crushed in ice cream, pt refusing to take meds any other w

## 2020-02-21 NOTE — PROGRESS NOTES
[facility]  Cardiology Progress Note    Opal Burrows Patient Status:  Inpatient    1945 MRN [id number]   [facility] 7NE-A Attending Jenny Grullon MD   [facility] Day # 16 PCP Jennifer Huynh MD     Subjective:  Vaginal bleeding con Oral Nightly         Assessment:  · Pelvis mass, s/p RADHA/SBP 2/3/20 - fibroma/leiomyoma per pathology. Vaginal bleeding continues,  Eliquis discontinued.    · Iesha op acute MI with both Anterior and Inferior ST elevation s/p PCI/KISHA of the RCA (culprit vess

## 2020-02-21 NOTE — PROGRESS NOTES
SUBJECTIVE: c/o L foot pain.  Has ongoing vaginal bleed    CC: Impaired ADL and mobility dysfuction due to Acute CVA s/p Hysterectomy  Interval History:Post-op acute inferior STEMI  - per cards  - s/p PCI to RCA  - IV cangrelor -> PO plavix  - ASA  - BB dec 86 — —   02/21/20 0830 154/73 98 °F (36.7 °C) Oral 83 24 96 %   02/21/20 0700 — — — 77 — —   02/21/20 0030 138/62 98 °F (36.7 °C) Oral 85 15 95 %   02/20/20 2100 146/60 97.5 °F (36.4 °C) Oral 85 20 95 %   02/20/20 1700 151/69 97.9 °F (36.6 °C) Oral 94 22 9

## 2020-02-21 NOTE — PLAN OF CARE
Assumed care at [address] [facility]. AOx4. Flat affect. 2100 meds given early per pt request.  Pt refused nursing assessment. Pt stated \"I'm comfortable now, just don't mess it up, I'll tell you when I need you. \"  Pt refused to have staff come in to her room for sherry

## 2020-02-22 NOTE — PLAN OF CARE
Pt is alert and oriented x4, flat affect. LUE flaccid, LLE weak, foot dusky, absent pedal pulse. RLE pedal per doppler. NSR, on 1L O2. Small amount of vaginal bleeding, Dr. Sabas Grace aware. Dr. Ivy Must paged.  Dr. Ivy Must examined pt, states vaginal stiches int

## 2020-02-22 NOTE — PROGRESS NOTES
[facility]  Progress Note    Olman Crawford Patient Status:  Inpatient    1945 MRN [id number]   [facility] 7NE-A Attending Candida Puckett MD   [facility] Day # 25 PCP Husam Nixon MD       SUBJECTIVE:  Comfortable  No problem excep Daily  Labetalol HCl (NORMODYNE) tab 50 mg, 50 mg, Oral, 2 times per day  diphenhydrAMINE HCl (BENADRYL) injection 12.5 mg, 12.5 mg, Intravenous, Q4H PRN    Or  diphenhydrAMINE (BENADRYL) cap/tab 25 mg, 25 mg, Oral, Q4H PRN  Insulin Aspart Pen (NOVOLOG) 10 10 mg, Rectal, Daily PRN  Fleet Enema (FLEET) 7-19 GM/118ML enema 133 mL, 1 enema, Rectal, Once PRN  Saline Nasal Spray (SALINE MIST) 1 spray, 1 spray, Each Nare, Q3H PRN  glucose (DEX4) oral liquid 15 g, 15 g, Oral, Q15 Min PRN    Or  Glucose-Vitamin C (D

## 2020-02-22 NOTE — PROGRESS NOTES
[facility] Hospitalist Progress Note     Last Lennox Patient Status:  Inpatient    1945 MRN [id number]   [facility] 7NE-A Attending Solange Meza MD   [facility] Day # 25 PCP Ernesto Redman MD     CC: follow up    S: Pt seen and examined. 02/21/20  0834 02/21/20  1158 02/21/20  1645 02/21/20  2235 02/22/20  0831   PGLU 105* 174* 334* 224* 119*       Imaging:        Meds:   Scheduled Medication:  • insulin detemir  15 Units Subcutaneous Nightly   • predniSONE  30 mg Oral Daily with breakfast exam.     # Post-op acute inferior STEMI  - per cards  - s/p PCI to RCA  - IV cangrelor -> PO plavix  - ASA  - BB decreased labetolol to 50 mg PO BID   - also with MV disease, moderate LM stenosis,  of LAD and moderate-severe LCx disease  - CV surgery n patient/family 2/10 and again 2/18  - unclear if angioedema vs lip dryness? No wheezing/stridor/rash/hemodynamic change or other signs of anaphylaxis.  Only new possible culprit medication would be contrast from angiogram  - IV methylpred -> transition to P

## 2020-02-22 NOTE — PROGRESS NOTES
Pharmacy Note:  Route Optimization for Omeprazole          Patient is currently on omeprazole 20mg IV q12hr.   The patient meets the criteria to convert to the oral equivalent as established by the IV to oral conversion protocol approved by the P&T commit

## 2020-02-22 NOTE — PROGRESS NOTES
[facility]  Cardiology Progress Note    Last Lennox Patient Status:  Inpatient    1945 MRN [id number]   [facility] 7NE-A Attending Solange Meza MD   [address] Day # 25 PCP Ernesto Redman MD     Subjective:  Vaginal bleeding con 3350  17 g Oral Daily   • Clopidogrel Bisulfate  75 mg Oral Daily   • Diclofenac Sodium   Topical BID   • magnesium oxide  400 mg Oral TID   • Senna  17.2 mg Oral Nightly   • docusate sodium  100 mg Oral BID   • gabapentin  400 mg Oral Nightly         Asse

## 2020-02-22 NOTE — PLAN OF CARE
Assumed care 1900. Pt A&Ox4. VSS. NSR on tele. No facial swelling noted. Medications given with ice cream and chocolate syrup. Pt stated this is the only way she will take her medications. Limited assessment done d/t pt unwillingness to cooperate.

## 2020-02-23 NOTE — PROGRESS NOTES
[facility] Hospitalist Progress Note     Juan F Sutton Patient Status:  Inpatient    1945 MRN [id number]   [facility] 7NE-A Attending Wicho Borges MD   [address] Day # 23 PCP Maria C Will MD     CC: follow up    S: Pt seen and examined. 02/23/20  0802   PGLU 119* 267* 222* 147* 119*       Imaging:        Meds:   Scheduled Medication:  • Losartan Potassium  25 mg Oral Daily   • Pantoprazole Sodium  40 mg Oral QAM AC   • insulin detemir  15 Units Subcutaneous Nightly   • aspirin  81 mg Oral of bleeding noted.       # Post-op acute inferior STEMI  - per cards  - s/p PCI to RCA  - IV cangrelor -> PO plavix  - ASA  - BB decreased labetolol to 50 mg PO BID   - also with MV disease, moderate LM stenosis,  of LAD and moderate-severe LCx disease and again 2/18  - unclear if angioedema vs lip dryness? No wheezing/stridor/rash/hemodynamic change or other signs of anaphylaxis.  Only new possible culprit medication would be contrast from angiogram  - IV methylpred -> transition to PO prednisone -- comp

## 2020-02-23 NOTE — PROGRESS NOTES
[facility]  Progress Note    Amarilis Wetzel Patient Status:  Inpatient    1945 MRN [id number]   [facility] 7NE-A Attending Diomedes Moncada MD   [facility] Day # 23 PCP Siomara Tse MD       Assessment and Plan:  Patient Active Probl distress    Objective:  /67 (BP Location: Left arm)   Pulse 69   Temp 97.5 °F (36.4 °C) (Oral)   Resp 12   Ht 5' 4\" (1.626 m)   Wt 182 lb 12.2 oz (82.9 kg)   SpO2 96%   BMI 31.36 kg/m²     Temp (24hrs), Av °F (36.7 °C), Min:97.5 °F (36.4 °C), Ma (PORCINE) drip 21784cugtn/250mL infusion CONTINUOUS, 200-3,000 Units/hr, Intravenous, Continuous  insulin detemir (LEVEMIR) 100 UNIT/ML flextouch 15 Units, 15 Units, Subcutaneous, Nightly  predniSONE (DELTASONE) tab 30 mg, 30 mg, Oral, Daily with breakfast 650 mg, Oral, Q4H PRN    Or  acetaminophen (TYLENOL) 650 MG rectal suppository 650 mg, 650 mg, Rectal, Q4H PRN  Labetalol HCl (TRANDATE) injection 10 mg, 10 mg, Intravenous, Q10 Min PRN  Senna (SENOKOT) tab 17.2 mg, 17.2 mg, Oral, Nightly  docusate sodium

## 2020-02-23 NOTE — PLAN OF CARE
Assumed care at [address]. Neuro assessment limited d/t patient being unwilling to cooperate. C/o L foot pain, declined medication at this time. Heparin gtt infusing per protocol. Both patient and daughter irritated that PTT was going to be needed at 2200. physical limitations  - Instruct pt to call for assistance with activity based on assessment  - Modify environment to reduce risk of injury  - Provide assistive devices as appropriate  - Consider OT/PT consult to assist with strengthening/mobility  - Encou PT/OT  - Encourage visually impaired, hearing impaired and aphasic patients to use assistive/communication devices  Outcome: Progressing     Problem: Delirium  Goal: Minimize duration of delirium  Description  Interventions:  - Encourage use of hearing aid during self-care  - Encourage patient to incorporate impaired side during daily activities to promote function   Outcome: Progressing     Problem: CARDIOVASCULAR - ADULT  Goal: Maintains optimal cardiac output and hemodynamic stability  Description  INTERV appropriate  Outcome: Progressing  Goal: Free from bleeding injury  Description  (Example usage: patient with low platelets)  INTERVENTIONS:  - Avoid intramuscular injections, enemas and rectal medication administration  - Ensure safe mobilization of patie

## 2020-02-23 NOTE — PLAN OF CARE
Pt is alert and oriented x4, LUE flaccid, R droop. Heparin gtt infusing, next PTT in the AM. Pt remains particular with care and medications. Pt agreed to bath this afternoon. Tramadol and tylenol given for pain, pt states no relief. Dr. Bharati Aguilar aware.  Pt

## 2020-02-24 NOTE — PLAN OF CARE
Assumed care of pt at 1900. Pt AOx4. Family at bedside. Heparin gtt infusing per protocol. Next PTT in am.  Pt no c/o pain. Pt had small amount vaginal bleeding in diaper when changed. Will continue to monitor.     Pt refused to have vitals done and t

## 2020-02-24 NOTE — PROGRESS NOTES
SUBJECTIVE: Better pain management of L LE ischemic limb. Was able to participate in PT today  Minimal vaginal bleed. CC: Impaired ADL and mobility dysfuction due to Acute CVA s/p Hysterectomy    Interval History: Reviewed 's note.  Possibility 175/76 98.5 °F (36.9 °C) Oral 73 15 96 %   02/24/20 0600 (!) 161/62 98.4 °F (36.9 °C) Oral 70 14 98 %   02/23/20 2048 156/72 98.5 °F (36.9 °C) Oral 81 16 96 %   02/23/20 1700 145/70 98.1 °F (36.7 °C) Oral 74 10 98 %       Intake/Output:  No intake or outpu

## 2020-02-24 NOTE — PHYSICAL THERAPY NOTE
PHYSICAL THERAPY TREATMENT NOTE - INPATIENT    Room Number: 9063/4035-N   Session: 8/10  Number of Visits to Meet Established Goals: 10(5 sessions added.)    Presenting Problem: pelvic mass s/p total hysterectomy on 2/4 post-op STEMI    History related to • CATARACT      left eye   • OTHER      Pacemaker   • XI ROBOT-ASSISTED LAPAROSCOPIC HYSTERECTOMY Bilateral 2/4/2020    Performed by Max Colon MD at [address]  'you can leave now\" - once patient was up to [facility] saad [place] independence. Pt's caregiver present for session. Discussed GOC, and short term/long term goals for optimal functional independence and safety. Transfers    Supine<>Sit: Mod A  Sit<>Stand:  Mod A  Transfers: 2 assist   Gait: See above flow sheet  Chair minimum assistance      Goal #2 Patient is able to demonstrate transfers Sit to/from Stand at assistance level: moderate assistance  Met on 2/12/2020  Updated to transfer from sit to stand with min assist of one from chair and bed.         Goal #3 Pt will

## 2020-02-24 NOTE — OCCUPATIONAL THERAPY NOTE
OCCUPATIONAL THERAPY TREATMENT NOTE - INPATIENT     Room Number: 1689/6920-N  Session: 4   Number of Visits to Meet Established Goals: 5    Presenting Problem: CVA    History related to current admission: Pt is 69year old female admitted on 2/3/2020.  Pt i ROBOT-ASSISTED LAPAROSCOPIC HYSTERECTOMY Bilateral 2/4/2020    Performed by Nicola Rivas MD at [address]  \"Not a good day. \"      OBJECTIVE  Precautions: (-180)    WEIGHT BEARING RESTRICTION  Weight Bearing Restriction: None RN aware of session/findings; All patient questions and concerns addressed; Other (Comment)(left with caregiver, pt on commode)    ASSESSMENT   Pt continues to work toward OT goals. Encouragement needed to increase activity level.   Current limitations: decre

## 2020-02-24 NOTE — PROGRESS NOTES
[facility] Hospitalist Progress Note     Kev Fritz Patient Status:  Inpatient    1945 MRN [id number]   [facility] 7NE-A Attending Erin Alexandra MD   [facility]osp Day # 21 PCP Debra Borges MD     CC: follow up    S: Pt seen and examined. Meds:   Scheduled Medication:  • PEG 3350  17 g Oral Daily   • Losartan Potassium  25 mg Oral Daily   • predniSONE  20 mg Oral Daily with breakfast   • Pantoprazole Sodium  40 mg Oral QAM AC   • insulin detemir  15 Units Subcutaneous Nightly   • aspiri noted.       # Post-op acute inferior STEMI  - per cards  - s/p PCI to RCA  - IV cangrelor -> PO plavix  - ASA  - BB decreased labetolol to 50 mg PO BID   - also with MV disease, moderate LM stenosis,  of LAD and moderate-severe LCx disease  - CV surger decreased nightly levemir from 20 U to 15 U    # Lip swelling - resolved  - noted by patient/family 2/10 and again 2/18  - unclear if angioedema vs lip dryness? No wheezing/stridor/rash/hemodynamic change or other signs of anaphylaxis.  Only new possible cu

## 2020-02-24 NOTE — CM/SW NOTE
4:40pm  MSW spoke to olga from [facility]- patient is accepted to Acute Rehab when medically cleared.

## 2020-02-24 NOTE — CM/SW NOTE
1:36pm  MSW spoke to patient and her caregiver at bedside about PMR change and dc needs. MSW provided Pt with list from ACR from [facility] website. Dtr wanted MSW to call her dtr Alcaraz Kathryn [phone]. MSW called pt's dtr to d/c plan. Potential DC Plan  1.  D

## 2020-02-24 NOTE — PROGRESS NOTES
Left foot has ischemic changes on all toes  Discussed watching, proceeding with angiogram, possible bypass, possible amputation   Patient exhausted and doesn't want anything at this time  Spoke with daughter who is gastroenterologist - she is ok with switc

## 2020-02-25 NOTE — CM/SW NOTE
jeremías spoke to dtr Celia and confirmed dc plan to [facility] once medically cleared. dtr made aware that pt may be ready to DC in next 24-48 hrs. jeremías following.

## 2020-02-25 NOTE — PLAN OF CARE
Assumed pt care at 0730  VSS  Neuros Qshift- no changes  Pt c/o leg pain- scheduled tylenol and tramadol given  Caretaker at bedside  Up to chair and commode x2  Heparin gtt infusing at 15/hour  Minimal vaginal bleeding  Pt refusing blood sugars and insuli

## 2020-02-25 NOTE — CM/SW NOTE
jeremías spoke to olga of  who confirms that pt is accepted to  acute rehab - pts vaginal bleeding will need to have stopped and be off heparin drip to admit to

## 2020-02-25 NOTE — CM/SW NOTE
Discussed with [facility] liaison - at this time they need more clarification about the vaginal bleeding. Is it expected to continue? Is the pt medically clear to do 3 hours of therapy per day while she his having this bleeding?  Are there recommendations and para

## 2020-02-25 NOTE — SLP NOTE
Attempted to see patient for skilled cognitive/ccomunicative therapy. She was occupied with physical therapy at the time. Unable to see on this date. Will attempt therapy on 2/26/20 based on patient's level of participation.      Jarvis Duenas MA, [facility]

## 2020-02-25 NOTE — PLAN OF CARE
A/OX4, RA, VSS, SR per Tele  Reused to be changed at 0100 and 0500  Minimal vaginal bleeding noted   Heparin gtt infusing per protocol  Needs attended to, Will cont to monitor.

## 2020-02-25 NOTE — PROGRESS NOTES
[facility] Hospitalist Progress Note     Sabine Lipscomb Patient Status:  Inpatient    1945 MRN [id number]   [facility] 7NE-A Attending Raphael Nguyen MD   [address] Day # 21 PCP Alina Del Angel MD     CC: 68 yo woman with complicated course s/ 168 hours.     Invalid input(s): ALPHOS, TBIL, DBIL, TPROT    Recent Labs   Lab 02/23/20  1227 02/23/20  1650 02/23/20  2116 02/24/20  2152 02/25/20  0807   PGLU 263* 197* 175* 247* 123*       Imaging:        Meds:   Scheduled Medication:  • PEG 3350  17 g by Dr. Stephanie Casarez, no wound dehiscence or source of bleeding noted.       # Post-op acute inferior STEMI  - per cards  - s/p PCI to RCA  - IV cangrelor -> PO plavix  - ASA  - BB decreased labetolol to 50 mg PO BID   - also with MV disease, moderate LM stenosis DMII  - BG elevated in setting of IV steroids  - hold PO meds  - decreased nightly levemir from 20 U to 15 U    # Lip swelling - resolved  - noted by patient/family 2/10 and again 2/18  - unclear if angioedema vs lip dryness?  No wheezing/stridor/rash/hemod

## 2020-02-25 NOTE — PROGRESS NOTES
[facility]  Cardiology Progress Note    Velasquez Urena Patient Status:  Inpatient    1945 MRN [id number]   [facility] 7NE-A Attending Humberto De La Paz MD   [facility] Day # 21 PCP Gabby Rowe MD     Subjective:  Sitting up in chair, docusate sodium  100 mg Oral BID   • gabapentin  400 mg Oral Nightly     • Continuous dose Heparin infusion 1,000 Units/hr (02/25/20 0817)       Assessment:  · Pelvis mass, s/p RADHA/SBP 2/3/20 - fibroma/leiomyoma per pathology. Vaginal bleeding decreasing

## 2020-02-25 NOTE — PLAN OF CARE
Pt is alert and oriented x4, NSR, on room air. LUE flaccid, LLE weak, dusky, absent pedal pulse. Per Dr. Aquilino Peters, pt to start lovenox tonight. Pt and daughter in agreement.  Possible dc to [facility] in 1-2 days if stable on lovenox and vaginal bleeding controlled,

## 2020-02-25 NOTE — PHYSICAL THERAPY NOTE
PHYSICAL THERAPY TREATMENT NOTE - INPATIENT    Room Number: 1486/3284-E   Session: 9/10  Number of Visits to Meet Established Goals: 10(5 sessions added.)    Presenting Problem: pelvic mass s/p total hysterectomy on 2/4 post-op STEMI    History related to • CATARACT      left eye   • OTHER      Pacemaker   • XI ROBOT-ASSISTED LAPAROSCOPIC HYSTERECTOMY Bilateral 2/4/2020    [facility]  \" I haven't done this in a long time. \"    Patient’s self-stated and reaching. Pt performed ORIN ROM to reduce stiffness. Pt worked on pregait activity, step forward and backward with RLE. Pt unable to advance LLE. Wt shift provided.    Pt ambulated with wall rail in hallway with one hand support, assist to advance LLE, 2/25/2020

## 2020-02-26 NOTE — PLAN OF CARE
A/OX4, RA, VSS, SR per Tele  Denies pain at this time  Heparin gtt stopped and sq lovenox started   Scant amount of vaginal bleeding noted   Neurologically unchanged  Needs attended to, Will cont to monitor.

## 2020-02-26 NOTE — OCCUPATIONAL THERAPY NOTE
OCCUPATIONAL THERAPY TREATMENT NOTE - INPATIENT     Room Number: 9458/3996-I  Session: 5   Number of Visits to Meet Established Goals: 10(5 more sessions added on 2/26)    Presenting Problem: CVA    History related to current admission: Pt is 69year old f Pacemaker   • XI ROBOT-ASSISTED LAPAROSCOPIC HYSTERECTOMY Bilateral 2/4/2020    Performed by Candice Arana MD at [address]  \"I want to work on this hand. Can I use the pegs? \" Referring to R hand.       OBJECTIVE  Precautions: (S understanding. Pt wanted to work on using pegs. Pt was able to reach for a peg, hold it, and twist it in place at set-up level for all 12 pegs. Able to remove them and place them back into a basin.    Patient End of Session: Up in chair;Needs met;Call li

## 2020-02-26 NOTE — PROGRESS NOTES
[facility]  Cardiology Progress Note    Julian Garcia Patient Status:  Inpatient    1945 MRN [id number]   [facility] 7NE-A Attending Ngoc Núñez MD   [facility] Day # 25 PCP Mikki Gil MD     Subjective:  No cardiac complaint 650 mg Oral TID   • traMADol HCl  50 mg Oral TID   • Losartan Potassium  25 mg Oral Daily   • Pantoprazole Sodium  40 mg Oral QAM AC   • insulin detemir  15 Units Subcutaneous Nightly   • aspirin  81 mg Oral Daily    Or   • aspirin  300 mg Rectal Daily   • controlled    Vaginal bleeding appears to be decreasing - no plans for intervention - Hgb stable at 9.2    Reviewed importance of close attention to both feet and protection against skin breakdown    Okay to transfer to  for rehab from our end    CV alexandru

## 2020-02-27 NOTE — PLAN OF CARE
Assumed care of patient at 07:00 am.  Pt A/O x 4. VSS. Pt reports pain relief of left foot with scheduled Tylenol and Tramadol. Pt refusing medical care and claudia care at times. Pt educated on importance and safety concerns.   Pt tolerated up to chair environment to reduce risk of injury  - Provide assistive devices as appropriate  - Consider OT/PT consult to assist with strengthening/mobility  - Encourage toileting schedule  Outcome: Adequate for Discharge     Problem: DISCHARGE PLANNING  Goal: Nadira Waters patients to use assistive/communication devices  Outcome: Adequate for Discharge     Problem: Delirium  Goal: Minimize duration of delirium  Description  Interventions:  - Encourage use of hearing aids, eye glasses  - Promote highest level of mobility rosina Encourage patient to incorporate impaired side during daily activities to promote function   Outcome: Adequate for Discharge     Problem: CARDIOVASCULAR - ADULT  Goal: Maintains optimal cardiac output and hemodynamic stability  Description  INTERVENTIONS: products/factors as ordered and appropriate  Outcome: Adequate for Discharge  Goal: Free from bleeding injury  Description  (Example usage: patient with low platelets)  INTERVENTIONS:  - Avoid intramuscular injections, enemas and rectal medication administ

## 2020-02-27 NOTE — CM/SW NOTE
02/27/20 0800   Discharge disposition   Expected discharge disposition Rehab [address]   Name of Facillity/Home Care/Hospice [facility]   Patient is Discharged to a [address] [place] [address] Yes   Discharge transportation [employer]

## 2020-02-27 NOTE — PROGRESS NOTES
Called  at [phone], spoke to receiving RN Cassidy, report given. All questions answered, verbalized understanding. Patient to be transferred at 18:00 pm tonight via [facility] ambulance. Patient, family and caregiver updated on POC.

## 2020-02-27 NOTE — PROGRESS NOTES
NURSING DISCHARGE NOTE    Discharge AVS completed. Patient okay to discharge per all consults. Discharge orders and instructions reviewed with receiving RN Cassidy at [facility]. All questions answered, verbalized understanding.   Discharge orders and instruct

## 2020-02-27 NOTE — DISCHARGE SUMMARY
[facility] Dis[facility] Summary    Patient ID:  Wanda Wyman  [id number]  2 year old  5/25/1945    Admit date: 2/3/2020  Discharge date and time: 2/26/20  Attending Physician: Christie Armando MD  Primary Care Physician: Shemar Coto MD     Admit follows:       # Pelvic mass/cyst  - s/p scheduled robotic-assisted hysterectomy with b/l salpingo-oophorectomy with OB/GYN on 2/4  - path benign  - appreciate exam and recs by Dr. Juliane Hathaway on 2/22 again, no wound dehiscence or source of bleeding noted.       patient/family 2/10 and again 2/18  - unclear if angioedema vs lip dryness? No wheezing/stridor/rash/hemodynamic change or other signs of anaphylaxis.  Only new possible culprit medication would be contrast from angiogram  - IV methylpred -> transition to P hours.     Insulin Aspart Pen 100 UNIT/ML Sopn  Commonly known as:  NOVOLOG  Inject 4-20 Units into the skin TID CC and HS. insulin detemir 100 UNIT/ML Sopn  Commonly known as:  LEVEMIR  Inject 15 Units into the skin nightly.      predniSONE 10 MG Tabs · medication strength  · how much to take  · how to take this  · when to take this  · additional instructions        CONTINUE taking these medications    aspirin 81 MG Chew  Chew 1 tablet (81 mg total) by mouth daily.      Clopidogrel Bisulfate 75 MG Tabs CONSULT PALLIATIVE CARE  IP CONSULT TO NEUROLOGY  IP CONSULT TO NEPHROLOGY  IP CONSULT TO SOCIAL WORK  IP CONSULT TO SOCIAL WORK  IP CONSULT TO INFECTIOUS DISEASE  IP CONSULT TO ALLERGY/IMMUNOLOGY  Radiology: Mri Brain (cpt=70551)    Result Date: 2/7/2020 TECHNIQUE:  CT images of the abdomen, pelvis, and lower extremities were obtained pre- and post- injection of non-ionic intravenous contrast material. Multi-planar reformatted/3-D images were created to optimize visualization of vascular anatomy.   Dose red artery occludes at its origin. The distal right superficial femoral artery reconstitutes at the adductor canal from collaterals in the thigh from the profunda femoral artery.   There is moderate approximately 65% stenosis within the proximal right poplitea the foot. There are some collateral some the peroneal artery reconstituting posterior tibial artery branches in the medial foot. The left dorsalis pedis artery does not reconstitute. LIVER:  There is fatty infiltration of the liver.   No focal hepatic mas and left posterior tibial arteries are also occluded. The peroneal artery reconstitute branches of the left posterior tibial artery within the foot. The left dorsalis pedis artery is not patent.  2. There is occlusion of the right superficial femoral mairsa occipital lobe compatible with areas of acute ischemia/infarction all falling within the left MCA territory. The largest area measures up to  2.0 x 1.6 cm along the left occipital lobe. There is no hydrocephalus. There is no midline shift.   There is sta INDICATIONS:  MI/CVA  PATIENT STATED HISTORY: (As transcribed by Technologist)  MI/CVA. Patient offered no additional history at this time. FINDINGS:  .  Mildly enlarged cardiac silhouette again seen. Minimal bibasilar atelectasis.   Mild blunting left 2/4/2020  PROCEDURE:  CT STROKE CTA BRAIN/CTA NECK (W IV)(CPT=70496/75356)  COMPARISON:  [facility] , CT, CTA BRAIN + CTA CAROTIDS (EMA=63205/91147), 3/11/2018, 12:03. [facility] , CT, CT STROKE BRAIN (NO IV)(CPT=70450), 2/04/2020, 14:32.   INDICATIONS:  rule out stenosis (image 382) is stable. Moderate stenosis involving takeoff of several M2 branches is stable on the right. Mild to moderate stenosis throughout the left M 2 branches is stable. Occlusion is not identified.   Intimal irregularities throughout bila posterior direction measuring 1.7 mm is relatively stable and of questionable clinical significance. This may represent an infundibulum.   4. Moderate narrowing of the very proximal supraclinoid internal carotid arteries bilaterally is again noted and rela

## 2020-03-01 PROBLEM — R73.9 HYPERGLYCEMIA: Status: ACTIVE | Noted: 2020-01-01

## 2020-03-01 PROBLEM — N93.9 VAGINAL BLEEDING: Status: ACTIVE | Noted: 2020-01-01

## 2020-03-01 PROBLEM — N17.9 ACUTE KIDNEY INJURY (HCC): Status: ACTIVE | Noted: 2020-01-01

## 2020-03-01 PROBLEM — E87.1 HYPONATREMIA: Status: ACTIVE | Noted: 2020-01-01

## 2020-03-01 PROBLEM — D64.9 SYMPTOMATIC ANEMIA: Status: ACTIVE | Noted: 2020-01-01

## 2020-03-01 PROBLEM — D72.829 LEUKOCYTOSIS: Status: ACTIVE | Noted: 2020-01-01

## 2020-03-01 NOTE — ED NOTES
Family at bedside. States had a hysterectomy then had a CVA and STEMI postoperatively. Was also diagnosed with UTI and left popliteal artery occlusion.   The occlusion was treated with anticoagulants because was felt to be too high risk to have an interve

## 2020-03-01 NOTE — ED INITIAL ASSESSMENT (HPI)
Arrives via [facility] ambulance from [facility] with a low hemoglobin around 6. Was rehabbing at [facility] for a CVA.   Labs were performed and found to be abnormal.

## 2020-03-01 NOTE — H&P
AB Hospitalist H&P       CC: Patient presents with:  Abnormal Result       PCP: Kulwant Solis MD    History of Present Illness:  Ms. Jagdish Anderson with PMH of pelvic mass s/p bilateral salpingo-oopherectomy with OB/GYN (2/4, pathology benign); course c/b infe Medications:  docusate sodium 100 MG Oral Cap, Take 100 mg by mouth 2 (two) times daily. , Disp: , Rfl:   diphenhydrAMINE HCl 25 MG Oral Cap, Take 25 mg by mouth every 6 (six) hours as needed for Itching., Disp: , Rfl:   famoTIDine 20 MG Oral Tab, Take 20 m daily., Disp: 30 tablet, Rfl: 0  acetaminophen 325 MG Oral Tab, Take 2 tablets (650 mg total) by mouth 3 (three) times daily. , Disp: 180 tablet, Rfl: 0  traMADol HCl 50 MG Oral Tab, Take 1 tablet (50 mg total) by mouth 3 (three) times daily. , Disp: 30 tabl lb (74.8 kg)  07/19/19 : 165 lb (74.8 kg)  07/12/19 : 160 lb (72.6 kg)    Gen: No acute distress, alert and oriented, pale  HEENT: sclera anicteric, oral mucosa moist  Pulm: Lungs clear bilaterally, good inspiratory effort   CV:  nL S1/S2, RRR  Abd: soft, occipital lobe is noted. Bony exostosis along the left parietal/temporal bone is suggested. Smaller FLAIR abnormalities scattered in the white matter are noted. The visualized paranasal sinuses and mastoid air cells are unremarkable.    The expected ma origin common hepatic artery. There are 2 renal arteries noted bilaterally. There also appears to be some moderate narrowing bilaterally at the origins.   The AVIS appears occluded at its origin but collateral vessels are seen  to reconstitute the AVIS appr left superficial femoral artery. There are segmental areas of stenosis throughout the left superficial femoral artery.   There is approximately 50% stenosis in the distal left superficial femoral artery at the adductor canal.  There are segmental areas of bilaterally. ABDOMINAL WALL:  Numerous subcutaneous opacities are seen along with some gas bubbles likely related to injection sites. Correlate clinically. URINARY BLADDER:  The bladder is distended. No wall thickening.   There is a small amount a gas in appropriate read back.     Dictated by: Storm Martínez MD on 2/09/2020 at 18:14     Approved by: Storm Martínez MD on 2/09/2020 at 18:42          Mri Stroke Brain Dwi Only(no Iv)(cpt=70551)    Result Date: 2/5/2020  PROCEDURE:  MRI STROKE BRAIN DWI 2/07/2020, 4:35. INDICATIONS:  low grade fever after CVA  PATIENT STATED HISTORY: (As transcribed by Technologist)  Patient offered no additional history at this time. FINDINGS:  Patient is rotated.   Cardiomediastinal silhouette stable with left-sided collection. Lucencies in the deep periventricular white matter are likely sequelae of chronic small vessel ischemic disease. Mild prominence of the sulci are noted. No mass effect. Visualized portions of paranasal sinuses are unremarkable.  Visualized porti unremarkable. There is mild narrowing of the junction of the cavernous and supraclinoid segments, near the ophthalmic segment of the right internal carotid artery.   Small focal outpouching off the supra clinoid right internal carotid artery in a posterior internal carotid arteries. The carotid bifurcations appear unremarkable. There is no evidence of hemodynamically significant stenosis in the carotid bulbs by NASCET criteria. The cervical internal carotid arteries are widely patent.   The vertebral arter 2/04/2020 at 14:51     Approved by: Hardin Cooks, MD on 2/04/2020 at 15:05                 ASSESSMENT / PLAN:     Ms. Lawler Sae with PMH of pelvic mass s/p bilateral salpingo-oopherectomy with OB/GYN (2/4, pathology benign); course c/b [facility], C to eat per ob/gyn  - ssc and accucheck, levemir in place of glargine, cute down from home dose    Prophy:  DVT: on lovenox  Deconditioning prevention: PT/OT    Dispo: admitted     Outpatient records reviewed confirming patient's medical history and medicat

## 2020-03-02 NOTE — PLAN OF CARE
Patient is A&O x4 but can be slightly forgetful at times.   Pt is very particular about care, irritable, mean towards staff/verbally abusive Requests pills crushed one at a time in a mixture of lactaid and chocolate icecream, will refuse meds otherwise   VS diabetes  Outcome: Progressing     Problem: CARDIOVASCULAR - ADULT  Goal: Absence of cardiac arrhythmias or at baseline  Description  INTERVENTIONS:  - Continuous cardiac monitoring, monitor vital signs, obtain 12 lead EKG if indicated  - Evaluate effectiv appropriate  Outcome: Progressing     Problem: NEUROLOGICAL - ADULT  Goal: Achieves stable or improved neurological status  Description  INTERVENTIONS  - Assess for and report changes in neurological status  - Initiate measures to prevent increased intracr

## 2020-03-02 NOTE — SLP NOTE
ADULT SWALLOWING EVALUATION    ASSESSMENT    ASSESSMENT/OVERALL IMPRESSION:  Pt seen this AM at bedside to assess swallow function. Pt seen alert and upright in bed. Pts caregiver present at bedside.  Pt admitted from [facility] due to abnormal labs and vag precautions  Discharge Recommendations/Plan: Undetermined    HISTORY   MEDICAL HISTORY  Reason for Referral: Altered diet consistency    Problem List  Principal Problem:    Symptomatic anemia  Active Problems:    Hyponatremia    Leukocytosis    Acute kidne possible esophageal involvement               GOALS  Goal #1 The patient will tolerate Regular consistency and thin liquids without overt signs or symptoms of aspiration with 90 % accuracy over 1-2 session(s).   New Goal    Goal #2 The patient/family/caregi

## 2020-03-02 NOTE — PROGRESS NOTES
AB Hospitalist Progress Note     [facility]      SUBJECTIVE:  Feeling ok, about the same  Bleeding stable  NO acute events overnight    OBJECTIVE:  Temp:  [96.9 °F (36.1 °C)-99.4 °F (37.4 °C)] 99.4 °F (37.4 °C)  Pulse:  [] 89  Resp:  [12-19] Technologist)    FINDINGS:   Prominence of the sulci is noted. There is no midline shift or mass effect. The basal cisterns are patent. The craniocervical junction is unremarkable.    Midline structures including corpus callosum, optic chiasm and cereb the abdominal aorta, common iliac, internal iliac and external iliac arteries. There is no evidence for aortic aneurysm or dissection. The origins of the celiac axis and superior mesenteric artery are patent.   There is atheromatous plaque identified with right peroneal artery is patent to the ankle. Collaterals from the right peroneal artery opacified some of the posterior tibial artery branches in the medial right  foot. The dorsalis pedis artery is not opacified.   LEFT LEG:  There is calcified and soft mass or enlargement. RETROPERITONEUM:  No mass or adenopathy. BOWEL/MESENTERY:  No visible mass, obstruction, or bowel wall thickening. There is a large amount of hyperdense fluid seen within the posterior cul-de-sac in the pelvis.   The density would fa There is a large hyperdense collection in the cul-de-sac of the pelvis that has the appearance of blood products given the density. By history, the patient has had a recent hysterectomy.   Some of the blood is seen in the lower pericolic gutters bilaterall results were read back.    Dictated by: Christian Lombardi MD on 2/05/2020 at 15:41     Approved by: Christian Lombardi MD on 2/05/2020 at 15:44          Xr Chest Ap Portable  (cpt=71045)    Result Date: 2/17/2020  PROCEDURE:  XR CHEST AP PORTABLE  (CPT=71045)  T Noncontrast CT scanning is performed through the brain. Dose reduction techniques were used. Dose information is transmitted to the [facility] [employer] of Radiology) NRDR ([address]) which includes the Dose Index Registry.   [employer] of Radiology) NRDR ([address]) which includes the Dose Index Registry.   PATIENT STATED HISTORY:(As transcribed by Technologist)    CONTRAST USED:  FINDINGS:  There is severe stenosis in the mid cavernous segment of the left internal proximally is stable. Distally this is new when compared to prior exam (image 340). Takeoff of bilateral posterior inferior cerebellar arteries is unremarkable. There is a 3-vessel aortic arch. The origins of the branch vessels appear widely patent.   Lora Rosaura likely due to atherosclerotic disease. 8. Moderate stenosis involving bilateral M 2 branches is relatively stable.   9. Moderate stenosis in the mid basilar artery along with the distal left V4 segment having severe stenosis is new when compared to prior liquid 30 g, 30 g, Oral, Q15 Min PRN    Or  Glucose-Vitamin C (DEX-4) chewable tab 8 tablet, 8 tablet, Oral, Q15 Min PRN  Insulin Aspart Pen (NOVOLOG) 100 UNIT/ML flexpen 1-5 Units, 1-5 Units, Subcutaneous, TID CC and HS  Piperacillin Sod-Tazobactam So (ZO many bacteria.  Did not have any culture results  - repeat UA with culture     # Hx Left LE Ischemia secondary to left popliteal artery occulusion  - has had some discoloration of left toes  - continue lovenox and anti-plt therapy  - discussed with Dr. Alexandru Ward about 5 hours prior to surgery). Would also like Hgb goal closer to 9-10 pre-operatively given complicated course following prior surgery. Discussed with Dr. Sonja Grady and ok with IV heparin in place of lovenox. Discussed with pharmacy as well.  Plan for hepar

## 2020-03-02 NOTE — CONSULTS
[facility] Pharmacy Note:  Renal Dose Adjustment    Nemo Manriquez has been prescribed famotidine (PEPCID) 40 mg orally every 12 hours. Estimated Creatinine Clearance: 27.8 mL/min (A) (based on SCr of 1.58 mg/dL (H)).     Her calculated creatinine clearance is

## 2020-03-02 NOTE — DIETARY NOTE
[address] [place] [address]     Admitting diagnosis:  Vaginal bleeding [N93.9]  Symptomatic anemia [D64.9]    Ht: 162.6 cm (5' 4.02\")  Wt: 83.7 kg (184 lb 8 oz). This is 154 % of IBW  Body mass index is 31.65 kg/m².   IBW: 54.5 k

## 2020-03-02 NOTE — OCCUPATIONAL THERAPY NOTE
Order received for Occupational Therapy evaluation. Attempted to see patient this AM and patient currently refusing due to awaiting diet clarification and reports she has not eaten/drank and can't perform any activities until she has food.   RN aware of re

## 2020-03-02 NOTE — ED PROVIDER NOTES
Patient Seen in: [facility] 3ne-a      History   Patient presents with:  Abnormal Result    Stated Complaint: hemoglobin around 6    HPI    Patient is a 81-year-old woman presents from [facility] with a low hemoglobin and persistent vaginal bleeding systems reviewed and negative except as noted above.     Physical Exam     ED Triage Vitals   BP 03/01/20 1529 132/76   Pulse 03/01/20 1529 108   Resp 03/01/20 1529 16   Temp 03/01/20 1529 96.9 °F (36.1 °C)   Temp src 03/01/20 1529 Temporal   SpO2 03/01/20 Monocyte Absolute 1.32 (*)     All other components within normal limits   CBC WITH DIFFERENTIAL WITH PLATELET    Narrative: The following orders were created for panel order CBC WITH DIFFERENTIAL WITH PLATELET.   Procedure Medication List                   Present on Admission  Date Reviewed: 2/4/2020          ICD-10-CM Noted POA    * (Principal) Symptomatic anemia D64.9 3/1/2020 Unknown    Acute kidney injury ([facility] [address] N17.9 3/1/2020 Yes    Hyperglycemia R73.9 3/1/2020 Yes    H

## 2020-03-02 NOTE — CONSULTS
[facility]    PATIENT'S NAME: Bina Girish   ATTENDING PHYSICIAN: Jessica Pruitt MD   CONSULTING PHYSICIAN: Louise Knott M.D.    PATIENT ACCOUNT#:   [de-identified]    LOCATION:  [address] [place] [address]  MEDICAL RECORD #:   [id number]       DATE OF BIRTH:

## 2020-03-02 NOTE — PROGRESS NOTES
[facility]  Cardiology Progress Note    Andrew Side Patient Status:  Inpatient    1945 MRN [id number]   [facility] 3NE-A Attending Saeid Benavidez MD   Hosp Day # 1 PCP Harjit Stockton MD     Subjective:  Denies chest pain or SO RADHA/SBP 2/3/20 - fibroma/leiomyoma per pathology. Readmitted from [facility] last night with bleeding and anemia with a Hgb of 6 . She was transfused 1 Unit PRBC . ASA has been held.   · Iesha op acute MI with both Anterior and Inferior ST elevation s/p PCI/

## 2020-03-02 NOTE — PLAN OF CARE
Patient is A&O x4 but can be slightly forgetful at times. VSS- NSR/low ST on tele. Patient c.o left leg pain- PRN tylenol and tramadol given with positive results. 1 unit PRBC transfusion completed during this shift.  Patient continues to have steady, mild measures for life threatening arrhythmias  - Monitor electrolytes and administer replacement therapy as ordered  Outcome: Progressing     Problem: METABOLIC/FLUID AND ELECTROLYTES - ADULT  Goal: Electrolytes maintained within normal limits  Description  IN cerebral perfusion and minimize risk of hemorrhage  - Monitor temperature, glucose, and sodium.  Initiate appropriate interventions as ordered  Outcome: Progressing

## 2020-03-02 NOTE — PROGRESS NOTES
Patient well known from previous admission and recent discharge  Agree with plan for exam under anesthesia with gyn  Hold lovenox and heparin drip

## 2020-03-02 NOTE — CONSULTS
[facility] Note:  Renal Dose Adjustment for Metoclopramide (REGLAN)    Willy Cardenas has been prescribed Metoclopramide (REGLAN) 10 mg every 8 hours as needed for n/v.    Estimated Creatinine Clearance: 27.8 mL/min (A) (based on SCr of 1.58 mg/dL (H)).

## 2020-03-02 NOTE — PLAN OF CARE
Problem: Impaired Swallowing  Goal: Minimize aspiration risk  Description  Interventions:  - Patient should be alert and upright for all feedings (90 degrees preferred)  - Offer food and liquids at a slow rate  - Straws OK  - Encourage small bites of chaz

## 2020-03-02 NOTE — PROGRESS NOTES
[facility] Cardiology      Complex case  55-minute patient care with more than half time face-to-face  6:55 PM to 7:50 PM    Kathleen Hay Patient Status:  Emergency    1945 MRN [id number]   [facility] EMERGENCY DEPARTME patient had recent ischemic stroke postop. In early February, MRI showed scattered acute infarcts throughout the left MCA territory along with the left occipital lobe near the left PCA territory by MRI. Patient also had stroke in 2018.   She has left hai with mild facial droop with history of 2 strokes. Musculoskeletal: normal range of motion, generalized weakness, no joint effusion. Integumentary: Warm and dry skin. No rashes. Psychiatric: Depression.     Laboratory/Data:    Labs:       Recent Labs   L CONCLUSION:  Scattered acute infarcts throughout the left MCA territory along with the left occipital lobe near the left PCA territory are again identified.    Dictated by: Amadou Logan MD on 2/07/2020 at 11:22     Approved by: Amadou Logan, IV)(CPT=70450), 2/04/2020, 14:32.   INDICATIONS:  cva  TECHNIQUE:  MRI of the brain was performed without intravenous gadolinium contrast using only diffusion     CONCLUSION:  There are scattered small areas of restricted diffusion noted in the left frontal Comment:weakness  Radiology Contrast *    OTHER (SEE COMMENTS)    Comment:Lower lip swelling.  Consider pre-treatment if             needed  Lipitor [Atorvastat*    INSOMNIA        Assessment:  · Symptomatic anemia due to vaginal bleeding in cardiac patient have to cover recent heart attack and stroke  · Lovenox per vascular surgeon Dr. Zettie Dancer -but will be held tonight -vascular surgeon was already called on consult -if we have to cut one blood thinner maybe we should leave the patient on Lovenox and Plavix

## 2020-03-02 NOTE — PHYSICAL THERAPY NOTE
Order received for Physical Therapy evaluation. Attempted to see patient this AM and patient currently refusing due to awaiting diet clarification and reports she has not eaten/drank and can't perform any activities until she has food.   RN aware of refusa

## 2020-03-03 NOTE — PHYSICAL THERAPY NOTE
Orders received, chart reviewed, and RN approved PT session. Pt lying in bed and declined PT. Pt states that \"Pt isn't going to help me. I am having surgery tomorrow. \" Pt is scheduled for surgery tomorrow at 1:00 pm. Pt will require new PT orders follow

## 2020-03-03 NOTE — BH PROGRESS NOTE
[facility] Resource Referral Counselor Note    Juan F Sutton Patient Status:  Inpatient    1945 MRN [id number]   [facility] 3NE-A Attending Mariah Camarillo MD   Hosp Day # 2 PCP Maria C Will MD       S(subjective) The pt d

## 2020-03-03 NOTE — PROGRESS NOTES
Per Anesthesia pt can not receive AM medications with her usual icecream, therefore will have to hold AM medications.

## 2020-03-03 NOTE — PROGRESS NOTES
[facility]  Progress Note    Kev Fritz Patient Status:  Inpatient    1945 MRN [id number]   [facility] 3NE-A Attending Suzy Davidson MD   Hosp Day # 2 PCP Debra Borges MD       SUBJECTIVE:  Still has bleeding  Scared of ev Oral, Q6H PRN  famoTIDine (PEPCID) tab 40 mg, 40 mg, Oral, QAM  gabapentin (NEURONTIN) cap 400 mg, 400 mg, Oral, Nightly  HYDROcodone-acetaminophen (NORCO) 5-325 MG per tab 1 tablet, 1 tablet, Oral, Q4H PRN  insulin detemir (LEVEMIR) 100 UNIT/ML flextouch

## 2020-03-03 NOTE — OCCUPATIONAL THERAPY NOTE
Orders received, chart reviewed, and RN approved OT session. Pt lying in bed and declined OT. Pt states that \"OT isn't going to help me. I am having surgery tomorrow. \" Pt is scheduled for surgery tomorrow at 1:00 pm.

## 2020-03-03 NOTE — PLAN OF CARE
Patient is A&O x4 but can be slightly forgetful at times. Patient can be quite irritable and mean towards staff when they attempt to assist her.  She \"does not want to be disturbed\" during the night and refused tylenol when temperature was slightly elevat arrhythmias or at baseline  Description  INTERVENTIONS:  - Continuous cardiac monitoring, monitor vital signs, obtain 12 lead EKG if indicated  - Evaluate effectiveness of antiarrhythmic and heart rate control medications as ordered  - Initiate emergency m neurological status  Description  INTERVENTIONS  - Assess for and report changes in neurological status  - Initiate measures to prevent increased intracranial pressure  - Maintain blood pressure and fluid volume within ordered parameters to optimize cerebr

## 2020-03-03 NOTE — PROGRESS NOTES
AB Hospitalist Progress Note     [facility]      SUBJECTIVE:  Feeling down today  Continues to have bleeding, about the same    OBJECTIVE:  Temp:  [98.3 °F (36.8 °C)-100.3 °F (37.9 °C)] 99.1 °F (37.3 °C)  Pulse:  [] 92  Resp:  [13-20] 15  BP: T2-weighted images with FLAIR sequences and diffusion weighted images without infusion. PATIENT STATED HISTORY: (As transcribed by Technologist)    FINDINGS:   Prominence of the sulci is noted. There is no midline shift or mass effect.    The basal cister ischemia. CONTRAST USED:  95cc of Omnipaque 350  FINDINGS:  AORTO-ILIAC:  There is calcified and soft plaque identified within the abdominal aorta, common iliac, internal iliac and external iliac arteries.   There is no evidence for aortic aneurysm or di tibioperoneal trunk is patent. The proximal  right posterior tibial artery is very small caliber and occludes in the upper calf. The right peroneal artery is patent to the ankle.   Collaterals from the right peroneal artery opacified some of the posterior ductal dilatation or mass. SPLEEN:  No enlargement or focal lesion. KIDNEYS:  No mass, obstruction, or calcification. ADRENALS:  No mass or enlargement. RETROPERITONEUM:  No mass or adenopathy.   BOWEL/MESENTERY:  No visible mass, obstruction, or bowel w collateral vessels reconstituting the right posterior tibial artery within the foot. The right dorsalis pedis artery is not patent.  3. There is a large hyperdense collection in the cul-de-sac of the pelvis that has the appearance of blood products given t cm along the left occipital lobe. Critical results were discussed with the patient's care nurse Quiana Barry at 1543 hours on 2/5/2020. Critical results were read back.    Dictated by: Ilia Galvan MD on 2/05/2020 at 15:41     Approved by: Ilia Galvan MD on (NO IV)(CPT=70450)  COMPARISON:  [facility] , CT, CT BRAIN OR HEAD (01770), 3/20/2018, 22:29. INDICATIONS:  CODE STROKE  TECHNIQUE:  Noncontrast CT scanning is performed through the brain. Dose reduction techniques were used.  Dose information is transmitted exam were performed using NASCET. Dose reduction techniques were used. Dose information is transmitted to the  [facility]) NRDR ([address]) which includes the Dose Index Registry.   PATIENT STATED HISTORY:(As tr Bilateral V4 segments are identified. Intimal irregularities with moderate to severe stenosis and multiple segments in the left V4 proximally is stable. Distally this is new when compared to prior exam (image 340).   Takeoff of bilateral posterior inferio moderate to severe stenosis throughout the right anterior cerebral artery A2 branch has increased since prior examination. This is likely due to atherosclerotic disease. 8. Moderate stenosis involving bilateral M 2 branches is relatively stable.   9. Mod Oral, Q15 Min PRN    Or  Glucose-Vitamin C (DEX-4) chewable tab 4 tablet, 4 tablet, Oral, Q15 Min PRN    Or  dextrose 50 % injection 50 mL, 50 mL, Intravenous, Q15 Min PRN    Or  glucose (DEX4) oral liquid 30 g, 30 g, Oral, Q15 Min PRN    Or  Glucose-Vitam artery occulusion  - has had some discoloration of left toes  - continue lovenox and anti-plt therapy  - discussed with Dr. Nathan Rose, seeing on c/s -- ok to hold lovenox and bridge with IV heparin pre-operatively     # CAD s/p Recent STEMI with PCI to RCA  -

## 2020-03-03 NOTE — PLAN OF CARE
Patient is A&O x4 but can be forgetful at times. Patient can be irritable and mean towards staff, paranoid at times.   Pt refuses to be turned and refuses to let staff check if she needs incontinence care at times, especially when her third party care giv vital signs, obtain 12 lead EKG if indicated  - Evaluate effectiveness of antiarrhythmic and heart rate control medications as ordered  - Initiate emergency measures for life threatening arrhythmias  - Monitor electrolytes and administer replacement therap status  - Initiate measures to prevent increased intracranial pressure  - Maintain blood pressure and fluid volume within ordered parameters to optimize cerebral perfusion and minimize risk of hemorrhage  - Monitor temperature, glucose, and sodium.  Initiat

## 2020-03-03 NOTE — ANESTHESIA PREPROCEDURE EVALUATION
PRE-OP EVALUATION    Patient Name: Safia Nixon    Pre-op Diagnosis: INPT    Procedure(s):  REPAIR OF VAGINAL DEHISCENCE    Surgeon(s) and Role:     Dai Smith MD - Primary    Pre-op vitals reviewed.   Temp: 98.3 °F (36.8 °C)  Pulse: 95  Resp: 1 dextrose 50 % injection 50 mL, 50 mL, Intravenous, Q15 Min PRN    Or  glucose (DEX4) oral liquid 30 g, 30 g, Oral, Q15 Min PRN    Or  Glucose-Vitamin C (DEX-4) chewable tab 8 tablet, 8 tablet, Oral, Q15 Min PRN  Insulin Aspart Pen (NOVOLOG) 100 UNIT/ML fle Ondansetron HCl (ZOFRAN) 4 mg tablet, Take 4 mg by mouth daily. , Disp: , Rfl:   ondansetron 4 MG Oral Tablet Dispersible, Take 4 mg by mouth every 6 (six) hours as needed for Nausea., Disp: , Rfl:   sodium chloride 0.9% SOLN 100 mL with Piperacillin Sod-Ta (+) past MI    (+) pacemaker/AICD                          Endo/Other      (+) diabetes  type 2, using insulin      (+) anemia                   Pulmonary    Negative pulmonary ROS.                        Neuro/Psych        (+) anxiety  (+) CVA BUN 21.0 (H) 01/08/2020    CREATSERUM 1.25 (H) 03/03/2020    CREATSERUM 1.08 (H) 01/08/2020     (H) 03/03/2020    GLU 94 01/08/2020    CA 7.8 (L) 03/03/2020            Airway      Mallampati: III  Mouth opening: 3 FB  TM distance: > 6 cm  Neck ROM:

## 2020-03-03 NOTE — PROGRESS NOTES
[facility]  Cardiology Progress Note    Laura Brania Patient Status:  Inpatient    1945 MRN [id number]   [facility] 3NE-A Attending Ciaran Lundberg MD   Hosp Day # 2 PCP Bipin Martinez MD     Subjective:  No chest pain or SOB, s Intravenous Once   • cetirizine  10 mg Oral BID with meals   • Clopidogrel Bisulfate  75 mg Oral Daily   • famoTIDine  40 mg Oral QAM   • gabapentin  400 mg Oral Nightly   • insulin detemir  10 Units Subcutaneous Nightly   • Pantoprazole Sodium  40 mg Oral

## 2020-03-04 NOTE — PLAN OF CARE
Patient A&O x 4. Room air. NSR/ST on tele. This AM HGB 8.6; 1 unit PRBC ordered per MD. Hemoglobin to be rechecked 45 min post transfusion. OR for Repair of Vaginal Dehiscence today at 1300. Post OR, patient placed on lovenox.  Cardio prefers patient to res patient for signs and symptoms of electrolyte imbalances  - Administer electrolyte replacement as ordered  - Monitor response to electrolyte replacements, including rhythm and repeat lab results as appropriate  - Fluid restriction as ordered  - Instruct pa feedings (90 degrees preferred)  - Offer food and liquids at a slow rate  - Straws OK  - Encourage small bites of food and small sips of liquid  - Offer pills one at a time, crush or deliver with applesauce as needed  - Discontinue feeding and notify MD (o

## 2020-03-04 NOTE — PLAN OF CARE
Patient is A&O x4 but can be slightly forgetful at times. Patient can be quite irritable and mean towards staff. Pt refusing to be turned and refused to let staff check if she needed incontinence care this morning. Pt refused 0400 vitals.  VSS- NSR/low ST o administer replacement therapy as ordered  Outcome: Progressing     Problem: METABOLIC/FLUID AND ELECTROLYTES - ADULT  Goal: Electrolytes maintained within normal limits  Description  INTERVENTIONS:  - Monitor labs and rhythm and assess patient for signs a glucose, and sodium.  Initiate appropriate interventions as ordered  Outcome: Progressing     Problem: Impaired Swallowing  Goal: Minimize aspiration risk  Description  Interventions:  - Patient should be alert and upright for all feedings (90 degrees prefe

## 2020-03-04 NOTE — OCCUPATIONAL THERAPY NOTE
Orders received, chart reviewed. Attempted to see pt this PM. Pt undergoing procedure. OT will continue to follow up as schedule allows and pt is appropriate.

## 2020-03-04 NOTE — SLP NOTE
Attempted to see pt for speech therapy services- pt NPO for pending procedure. Will follow up as scheduling permits. Thank you.     Isabella Nieto M.S. [id number] [facility]  Pager [phone]

## 2020-03-04 NOTE — ANESTHESIA POSTPROCEDURE EVALUATION
[address] [facility] Patient Status:  Inpatient   Age/Gender 69year old female MRN [id number]   [facility] SURGERY Attending Massiel Mo, [facility] Se Day # 3 PCP Norman Trevizo MD       Anesthesia Post-op Note    Procedure(s):

## 2020-03-04 NOTE — PROGRESS NOTES
[facility]  Brief Op Note    Margarito Pang Location: OR   [facility] [id number] MRN [id number]   Admission Date 3/1/2020 Operation Date 3/4/2020   Attending Physician Wolf Nye MD Operating Physician Rah Khan MD     Pre-Operative Diagnosis: Po

## 2020-03-04 NOTE — PROGRESS NOTES
AB Hospitalist Progress Note     [facility]      SUBJECTIVE:  Feeling a little better today  Having pain in left leg  No CP or SOB    OBJECTIVE:  Temp:  [98.1 °F (36.7 °C)-100.4 °F (38 °C)] 98.1 °F (36.7 °C)  Pulse:  [] 94  Resp:  [17-24] 24 ONLY(NO IV)(CPT=70551), 2/05/2020, 15:16. INDICATIONS:  evaluate for stroke  TECHNIQUE:  MRI of the brain was performed with multi-planar T1, T2-weighted images with FLAIR sequences and diffusion weighted images without infusion.   PATIENT STATED HISTORY: includes the Dose Index Registry. PATIENT STATED HISTORY:(As transcribed by Technologist)  Patient presents for evaluation of left lower extremity ischemia.     CONTRAST USED:  95cc of Omnipaque 350  FINDINGS:  AORTO-ILIAC:  There is calcified and soft idris artery then occludes in the upper calf with segmental areas of reconstitution. The distal anterior tibial artery is occluded. The right tibioperoneal trunk is patent.   The proximal  right posterior tibial artery is very small caliber and occludes in the lumen.  No pericholecystic inflammation or gallbladder wall thickening. No ductal dilatation. PANCREAS:  There is pancreatic atrophy. No ductal dilatation or mass. SPLEEN:  No enlargement or focal lesion.   KIDNEYS:  No mass, obstruction, or calcification artery. There is moderate stenosis in the native right popliteal artery. There is 1 vessel peroneal artery runoff to the ankle with collateral vessels reconstituting the right posterior tibial artery within the foot.   The right dorsalis pedis artery is n left parietal lobe, left occipital lobe compatible with areas of acute ischemia/infarction. The largest area measures up to 2.0 x 1.6 cm along the left occipital lobe.   Critical results were discussed with the patient's care nurse Sandeep Reyna at 1543 hours on 2/ Approved by: Lio Aguilar MD on 2/07/2020 at 6:17          Ct Stroke Brain (no Iv)(cpt=70450)    PROCEDURE:  CT STROKE BRAIN (NO IV)(CPT=70450)  COMPARISON:  [facility] CT, CT BRAIN OR HEAD ([id number]), 3/20/2018, 22:29.   INDICATIONS:  CODE STROKE  TECHNI were obtained. Curved planar reformats were performed through the carotid and vertebral arteries. All measurements obtained in this exam were performed using NASCET. Dose reduction techniques were used.  Dose information is transmitted to the ACR (American in the mid basilar artery is new when compared to prior examination likely related to atherosclerotic plaque.  (image 391). Bilateral V4 segments are identified.   Intimal irregularities with moderate to severe stenosis and multiple segments in the left V4 3/11/2018. 6. Large vessel occlusion is not identified in the major arterial structures in the head. 7. Multiple segments with moderate to severe stenosis throughout the right anterior cerebral artery A2 branch has increased since prior examination.   Rolando Torres Calcium (CRESTOR) tab 20 mg, 20 mg, Oral, Daily  Saline Nasal Spray (SALINE MIST) 2 spray, 2 spray, Nasal, PRN  traMADol HCl (ULTRAM) tab 50 mg, 50 mg, Oral, Q12H PRN  glucose (DEX4) oral liquid 15 g, 15 g, Oral, Q15 Min PRN    Or  Glucose-Vitamin C (DEX-4 moderate leuk esterase, 50 WBC, many bacteria. Did not have any culture results or pending culture.  Will attempt calling again today  - repeat UA frankly abnormal, urine culture NGTD -- although patient had been on abx prior to arrival     # Hx Left LE Isc

## 2020-03-05 NOTE — PROGRESS NOTES
Pt much more pleasant this AM  VSS  Waiting for PTT result- heparin drip infusing  Mcclellan removed and vaginal packing removed per doctor order and patient request  Minimal drainage noted in vaginal packing- no bleeding noted from vagina  Small-medium soft s

## 2020-03-05 NOTE — OCCUPATIONAL THERAPY NOTE
OCCUPATIONAL THERAPY EVALUATION - INPATIENT     Room Number: 6685/7152-B  Evaluation Date: 3/5/2020  Type of Evaluation: Initial  Presenting Problem: symptomatic anemia(s/p hysterectomy 3/4/2020)    Physician Order: IP Consult to Occupational Therapy  Reas PROFILE    HOME SITUATION  Type of Home: House  Home Layout: One level       Toilet and Equipment: Standard height toilet  Shower/Tub and Equipment: Walk-in shower; Tub-shower combo; Tub transfer bench  Other Equipment: Other (Comment)(power w/c)    Occupati None                ACTIVITY TOLERANCE                         O2 SATURATIONS                ACTIVITIES OF DAILY LIVING ASSESSMENT  AM-PAC ‘6-Clicks’ Inpatient Daily Activity Short Form  How much help from another person does the patient currently need…  - performance deficits: endurance, pain, ADL dysfunction. These deficits impact the patient’s ability to participate in BADLs and functional mobility/transfers, instrumental activities of daily living, rest and sleep, work, leisure and social participation. Program Goal  Patient will be independent with right AROM HEP (home exercise program). Additional Goals:  Pt will tolerate sitting at EOB for 3 minutes utilizing AD as needed to perform grooming ADLs at sink level.

## 2020-03-05 NOTE — SLP NOTE
SPEECH DAILY NOTE - INPATIENT    ASSESSMENT & PLAN   ASSESSMENT  Pt seen for dysphagia tx to assess tolerance with recommended diet, ensure proper utilization of aspiration precautions and provide pt/family education.  Breakfast tray present as well as care [address]

## 2020-03-05 NOTE — CM/SW NOTE
Message left for todd mosquera liaandrew espinoza regarding re assessment for return to acute rehab; await call back    Spoke with olga from [address] [place] [address] to be re assessed by [facility] today/tomorrow

## 2020-03-05 NOTE — PHYSICAL THERAPY NOTE
PHYSICAL THERAPY EVALUATION - INPATIENT     Room Number: 9989/5671-H  Evaluation Date: 3/5/2020  Type of Evaluation: Initial  Physician Order: PT Eval and Treat    Presenting Problem: symptomatic anemia  Reason for Therapy: Mobility Dysfunction and D by Flavio Lance MD at [address] [place] [address]  Type of Home: House   Home Layout: One level                Lives With: Caregiver part-time(9 am to 10:30 pm)  Drives: No     Patient Regularly Uses: Reading glasses    Prior Level of Independenc hypersensitivity in the L distal leg/ankle/foot to light touch. Lower extremity strength is within functional limits No muscle contraction  observed in the L LE. Difficulty assessing R LE strength as pt supine.  Pt able to perform R LE AROM within avail Sammi. Discussed position changes, pressure relief, use of PRAFO boots (pt declined), up to chair, use of lift equipment (pt declined). PROM performed for the L UE and L LE. Gentle stretching for the bilateral ankles.  Removed pillow from behind head, as it i patient's clinical presentation is evolving and overall the evaluation complexity is considered high. These impairments and comorbidities manifest themselves as functional limitations in independent bed mobility, transfers, and gait.   The patient is below

## 2020-03-05 NOTE — PROGRESS NOTES
SARAHIG Hospitalist Progress Note     [facility]      SUBJECTIVE:  Doing okay  Eating some food brought from home  About to work with PT and agreeable to it  Moving bowels  Little blood in undergarments, not very much per family, caregiver.   R forearm/h 105* 104* 215* 120*     Imaging:  Meds:   No current outpatient medications on file.       Enoxaparin Sodium (LOVENOX) 80 MG/0.8ML injection 80 mg, 80 mg, Subcutaneous, 2 times per day  morphINE sulfate (PF) 4 MG/ML injection 1 mg, 1 mg, Intravenous, Q4H CT Daily PRN  Rosuvastatin Calcium (CRESTOR) tab 20 mg, 20 mg, Oral, Daily  Saline Nasal Spray (SALINE MIST) 2 spray, 2 spray, Nasal, PRN  traMADol HCl (ULTRAM) tab 50 mg, 50 mg, Oral, Q12H PRN  glucose (DEX4) oral liquid 15 g, 15 g, Oral, Q15 Min PRN    Or treating for 5-7 days     # Hx Left LE Ischemia secondary to left popliteal artery occulusion  - has had some discoloration of left toes  - continue lovenox and anti-plt therapy     # CAD s/p Recent STEMI with PCI to RCA  - continue ASA/plavix per cardiolo

## 2020-03-05 NOTE — PLAN OF CARE
A/O x 4. Patient has been pleasant this shift. Room air, o2 sats have been wnl. Tele NSR, VSS. Small amount of blood noted in urine this am, no further blood noted in brief this shift. Patient has c/o generalized pain this shift.   Tramadol PO PRN and Electrolytes maintained within normal limits  Description  INTERVENTIONS:  - Monitor labs and rhythm and assess patient for signs and symptoms of electrolyte imbalances  - Administer electrolyte replacement as ordered  - Monitor response to electrolyte rep Swallowing  Goal: Minimize aspiration risk  Description  Interventions:  - Patient should be alert and upright for all feedings (90 degrees preferred)  - Offer food and liquids at a slow rate  - Straws OK  - Encourage small bites of food and small sips of

## 2020-03-05 NOTE — OPERATIVE REPORT
[facility]    PATIENT'S NAME: Irineo Mills   ATTENDING PHYSICIAN: Calvin Miller MD   OPERATING PHYSICIAN: Dejah Paredes M.D.    PATIENT ACCOUNT#:   [de-identified]    LOCATION:  [address] [place] [address]  MEDICAL RECORD #:   [id number]       DATE OF BIRTH: [birth year]  [facility] [id number]  /

## 2020-03-05 NOTE — PROGRESS NOTES
[facility]  Cardiology Progress Note    Opal Burrows Patient Status:  Inpatient    1945 MRN [id number]   [facility] 3NE-A Attending Toño Higgins MD   Hosp Day # 4 PCP Jennifer Huynh MD     Subjective:  States she slept well o non-tender, + BS   Extremities:  LLE mottled, foot bluish and warm, cyanosis noted on tip of 1st and 2nd toe, purple color noted under 4th and 5th toes.   Skin: Warm and dry.      Medications:  • magnesium oxide  400 mg Oral TID CC   • sodium chloride   Int and ARB  · Continue to monitor for bleeding, increase activity as tolerated. · Plan to return to [facility] when medically stable       Discussed plan of care with patient and nursing. JB Long  3/5/2020  6:27 AM    Seen and examined.  Review

## 2020-03-05 NOTE — PLAN OF CARE
A/O x 4. Very irritable and at times can be uncooperative or not wanting to participate in care. Verbally aggressive to staff sometimes.  Depressed, anxious, demanding  Medications must be finely crushed and placed in her personal ice cream in freezer  L si indicated  - Evaluate effectiveness of antiarrhythmic and heart rate control medications as ordered  - Initiate emergency measures for life threatening arrhythmias  - Monitor electrolytes and administer replacement therapy as ordered  Outcome: Progressing signs and symptoms of bleeding or hemorrhage  - Monitor labs and vital signs for trends  - Administer supportive blood products/factors, fluids and medications as ordered and appropriate  - Administer supportive blood products/factors as ordered and approp

## 2020-03-06 NOTE — PROGRESS NOTES
AB Hospitalist Progress Note     [facility]      SUBJECTIVE:  R hand swelling improved although R thumb hurts- pt reports she has had this before. Otherwise no sob. Last night pt reports she did not have any significant vaginal bleeding.  Caregive 03/05/20  0812 03/05/20  1335 03/05/20  1837 03/05/20  2124 03/06/20  1331   PGLU 120* 156* 134* 133* 116*     Imaging:  Meds:   No current outpatient medications on file.       Enoxaparin Sodium (LOVENOX) 80 MG/0.8ML injection 80 mg, 80 mg, Subcutaneous, 2 PRN  Rosuvastatin Calcium (CRESTOR) tab 20 mg, 20 mg, Oral, Daily  Saline Nasal Spray (SALINE MIST) 2 spray, 2 spray, Nasal, PRN  traMADol HCl (ULTRAM) tab 50 mg, 50 mg, Oral, Q12H PRN  glucose (DEX4) oral liquid 15 g, 15 g, Oral, Q15 Min PRN    Or  Glucos treating for 5-7 days     # Hx Left LE Ischemia secondary to left popliteal artery occulusion  - has had some discoloration of left toes  - continue lovenox and anti-plt therapy     # CAD s/p Recent STEMI with PCI to RCA  - continue ASA/plavix per cardiolo

## 2020-03-06 NOTE — PLAN OF CARE
Received patient awake in bed. Daughter at bedside. AOx4, flat affect, with left facial droop, left arm flaccidity and left leg flaccid. Elevated left arm and foot on a pillow. NSR, on lovenox for VTE. No scd due to occlusion left leg.  Gave tap water enema ADULT  Goal: Electrolytes maintained within normal limits  Description  INTERVENTIONS:  - Monitor labs and rhythm and assess patient for signs and symptoms of electrolyte imbalances  - Administer electrolyte replacement as ordered  - Monitor response to el Swallowing  Goal: Minimize aspiration risk  Description  Interventions:  - Patient should be alert and upright for all feedings (90 degrees preferred)  - Offer food and liquids at a slow rate  - Straws OK  - Encourage small bites of food and small sips of

## 2020-03-06 NOTE — PROGRESS NOTES
· [facility]      Subjective:  Feels that vaginal bleeding has stopped. No CP or dyspnea    Objective:  150/84  Afebrile  SR/ST  I/O incomplete  Hgb 11.1  General: alert, cooperative, as conversant as I've ever seen her.   Neuro: awake/alert w

## 2020-03-06 NOTE — CONSULTS
[facility]  Inpatient Wound Care Contact Note    Nemo Manriquez Patient Status:  Inpatient    1945 MRN [id number]   [facility] 3NE-A Attending Fito Howard MD   Hosp Day # 5 PCP Jennifer Dave MD     Attempted to see patient fo

## 2020-03-06 NOTE — CM/SW NOTE
Care Progression Note:  Active Acute Medical Issue:   Acute on chronic anemia due to vaginal bleeding,  recent pelvic mass s/p bilateral salpingo-oopherectomy on 2/4/20, post op course complicated by M, s/p PCI/KISHA RCA and CVA, suspected UTI, mild ROLANDO on C

## 2020-03-07 NOTE — PROGRESS NOTES
AB Hospitalist Progress Note     [facility]      SUBJECTIVE:  R hand back to baseline  No sob  Tolerating PO  No bleeding    OBJECTIVE:  Temp:  [98.3 °F (36.8 °C)] 98.3 °F (36.8 °C)  Pulse:  [64-97] 64  Resp:  [16-18] 16  BP: (127-140)/(59-70) 140/ chloride 0.9% 250 mL IVPB, 40 mEq, Intravenous, Once  traMADol HCl (ULTRAM) tab 50 mg, 50 mg, Oral, 2 times per day  acetaminophen (TYLENOL) tab 650 mg, 650 mg, Oral, BID  Pantoprazole Sodium (PROTONIX) 40 mg in Sodium Chloride 0.9 % 10 mL IV push, 40 mg, Or  dextrose 50 % injection 50 mL, 50 mL, Intravenous, Q15 Min PRN    Or  glucose (DEX4) oral liquid 30 g, 30 g, Oral, Q15 Min PRN    Or  Glucose-Vitamin C (DEX-4) chewable tab 8 tablet, 8 tablet, Oral, Q15 Min PRN  Insulin Aspart Pen (NOVOLOG) 100 UNIT/ML LE Ischemia secondary to left popliteal artery occulusion  - has had some discoloration of left toes  - continue lovenox and anti-plt therapy     # CAD s/p Recent STEMI with PCI to RCA  - continue ASA/plavix per cardiology  - continue home statin  - [facility]

## 2020-03-07 NOTE — PROGRESS NOTES
[facility]  Progress Note    Kathleen Hay Patient Status:  Inpatient    1945 MRN [id number]   [facility] 3NE-A Attending Lana Morris MD   [facility] Day # 6 PCP Savannah Lyles MD       SUBJECTIVE:  Overall comfortable and not as scar suppository 25 mg, 25 mg, Rectal, Q12H PRN  diphenhydrAMINE HCl (BENADRYL) injection 12.5 mg, 12.5 mg, Intravenous, Q4H PRN  acetaminophen (TYLENOL) tab 650 mg, 650 mg, Oral, Q4H PRN    Or  HYDROcodone-acetaminophen (NORCO) 5-325 MG per tab 1 tablet, 1 tab 3.375 g, Intravenous, Q8H          Assessment/Plan:   1. No more vaginal bleeding     Dispo: Management per medical service    Questions/concerns were discussed with patient and/or family by bedside.     Teressa Castelan  3/7/2020  10:19 AM

## 2020-03-07 NOTE — PLAN OF CARE
Received patient awake, alert, oriented x 4. Left arm and leg flaccid from previous stroke. Elevated on pillow. On room air, clear lungs. NSR. No SCD due to occlusion to left popliteal. Incontinent, briefed. On IV abx for UTI.  Re eval for MJ in am. QID acc Problem: METABOLIC/FLUID AND ELECTROLYTES - ADULT  Goal: Electrolytes maintained within normal limits  Description  INTERVENTIONS:  - Monitor labs and rhythm and assess patient for signs and symptoms of electrolyte imbalances  - Administer electrolyte re ordered  Outcome: Progressing     Problem: Impaired Swallowing  Goal: Minimize aspiration risk  Description  Interventions:  - Patient should be alert and upright for all feedings (90 degrees preferred)  - Offer food and liquids at a slow rate  - Straws OK

## 2020-03-07 NOTE — CONSULTS
[address] [place] [address] Patient Status:  Inpatient    1945 MRN [id number]   [facility] 3NE-A Attending Lana Morris MD   [facility] Day # 6 PCP Savannah Lyles MD     Patient Identification  Kathleen Hay is a 69year old female. concerned about her blood pressure not dropping under 130 given her carotid stenosis. He has been started back in PT and OT. The patient otherwise very pleasant and motivated. She has leukocytosis with suspected UTI started on Zosyn.   Leukocytosis impro BID  Pantoprazole Sodium (PROTONIX) 40 mg in Sodium Chloride 0.9 % 10 mL IV push, 40 mg, Intravenous, Daily  Enoxaparin Sodium (LOVENOX) 80 MG/0.8ML injection 80 mg, 80 mg, Subcutaneous, 2 times per day  [COMPLETED] furosemide (LASIX) injection 40 mg, 40 m g, Oral, Daily PRN  Rosuvastatin Calcium (CRESTOR) tab 20 mg, 20 mg, Oral, Daily  Saline Nasal Spray (SALINE MIST) 2 spray, 2 spray, Nasal, PRN  glucose (DEX4) oral liquid 15 g, 15 g, Oral, Q15 Min PRN    Or  Glucose-Vitamin C (DEX-4) chewable tab 4 tablet breastfeeding.     Intake/Output Summary (Last 24 hours) at 3/7/2020 1740  Last data filed at 3/6/2020 1918  Gross per 24 hour   Intake 240 ml   Output —   Net 240 ml       Physical Exam:                                      General: Alert, cooperative, no ASSESSMENT:  Impaired mobility and self-care secondary to acute stroke as well as late effects of chronic stroke with spastic left hemiparesis    Deconditioning status post hysterec

## 2020-03-07 NOTE — PROGRESS NOTES
· [facility]      Subjective:  No new issues, no bleeding, right hand stronger    Objective:  -150 Afebrile  SR  I/O incomplete  BUN/cr 14/1.13  Hgb 9.8    Neuro: awake/alert with left hemiparesis  HEENT: no JVD.   Cardiac: S1 S2 regular

## 2020-03-08 NOTE — PROGRESS NOTES
AB Hospitalist Progress Note     [facility]      SUBJECTIVE:  Feeling better  Eating has improved    OBJECTIVE:  Temp:  [98.2 °F (36.8 °C)-99 °F (37.2 °C)] 98.3 °F (36.8 °C)  Pulse:  [74-95] 74  Resp:  [13-17] 15  BP: (140-155)/(49-65) 148/61    In MR, MRI STROKE BRAIN DWI ONLY(NO IV)(CPT=70551), 2/05/2020, 15:16. INDICATIONS:  evaluate for stroke  TECHNIQUE:  MRI of the brain was performed with multi-planar T1, T2-weighted images with FLAIR sequences and diffusion weighted images without infusion. Data Registry) which includes the Dose Index Registry. PATIENT STATED HISTORY:(As transcribed by Technologist)  Patient presents for evaluation of left lower extremity ischemia.     CONTRAST USED:  95cc of Omnipaque 350  FINDINGS:  AORTO-ILIAC:  There is c The anterior tibial artery then occludes in the upper calf with segmental areas of reconstitution. The distal anterior tibial artery is occluded. The right tibioperoneal trunk is patent.   The proximal  right posterior tibial artery is very small caliber within the gallbladder lumen. No pericholecystic inflammation or gallbladder wall thickening. No ductal dilatation. PANCREAS:  There is pancreatic atrophy. No ductal dilatation or mass. SPLEEN:  No enlargement or focal lesion.   KIDNEYS:  No mass, obstru from the profunda femoral artery. There is moderate stenosis in the native right popliteal artery. There is 1 vessel peroneal artery runoff to the ankle with collateral vessels reconstituting the right posterior tibial artery within the foot.   The right mg, Oral, BID  Pantoprazole Sodium (PROTONIX) 40 mg in Sodium Chloride 0.9 % 10 mL IV push, 40 mg, Intravenous, Daily  Enoxaparin Sodium (LOVENOX) 80 MG/0.8ML injection 80 mg, 80 mg, Subcutaneous, 2 times per day  morphINE sulfate (PF) 4 MG/ML injection 1 Aspart Pen (NOVOLOG) 100 UNIT/ML flexpen 1-5 Units, 1-5 Units, Subcutaneous, TID CC and HS  Piperacillin Sod-Tazobactam So (ZOSYN) 3.375 g in dextrose 5 % 100 mL ADD-vantage, 3.375 g, Intravenous, Q8H       Assessment/Plan:     Ms. Jair Grider with PMH of pelv left sided hemiparesis  - Asa, plavix, statin  - neuro and neuro IR saw last admit -- does have severe left ICA stenosis, medical management     # Severe Left ICA stenosis  - as above, medical management  - per daughter is sensitive to drops in BP, does be

## 2020-03-08 NOTE — PROGRESS NOTES
Patient alert x4, ra, tele, up with max assist and gait belt. Poor appetite, patient requesting meds be crushed individually w/ice cream.  Incontinent, mepilex x3, wound care consulted.   Dr. Ronda Null saw patient today and the plan is to return to [facility]  next wee

## 2020-03-08 NOTE — PLAN OF CARE
A&Ox4. Nasal cannula on for comfort. VSS. Pt refused to be woken up for 0400 vitals and to be changed. Complains of generalized pain. Scheduled tramadol and tylenol. QID accu checks. Ate dinner late and refused midnight blood sugar check. IV zosyn for UTI. electrolyte replacement as ordered  - Monitor response to electrolyte replacements, including rhythm and repeat lab results as appropriate  - Fluid restriction as ordered  - Instruct patient on fluid and nutrition restrictions as appropriate  Outcome: Prog stability  Description  INTERVENTIONS  - Assess for signs and symptoms of bleeding or hemorrhage  - Monitor labs and vital signs for trends  - Administer supportive blood products/factors, fluids and medications as ordered and appropriate  - Administer sup

## 2020-03-08 NOTE — PLAN OF CARE
Pt A&O x 4. Hx of stroke -  L sided weakness (baseline). On tele - NSR. Briefed - incontinent at times. UTI - IV ABX. Reports generalized pain - meds given per MAR. Pt 1-2 assist, not able to ambulate on own. Plan for DC to [facility] - still pending.  Will rhythm and repeat lab results as appropriate  - Fluid restriction as ordered  - Instruct patient on fluid and nutrition restrictions as appropriate  Outcome: Progressing  Goal: Hemodynamic stability and optimal renal function maintained  Description  INTER time, crush or deliver with applesauce as needed  - Discontinue feeding and notify MD (or speech pathologist) if coughing or persistent throat clearing or wet/gurgly vocal quality is noted   Outcome: Progressing     Problem: Impaired Functional Mobility  G

## 2020-03-08 NOTE — PROGRESS NOTES
Cardiology    No new complaints overnight. No pain left foot but says she has been taking Norco.  Left 4th/5th toe dark,  Dusky 2nd/3rd toes. Some mottling dorsum of foot into calf. PT audible with doppler. Remains on Lovenox and Plavix.     Other issues

## 2020-03-09 NOTE — PLAN OF CARE
Pt did have some vaginal bleeding this am, in brief per caregiver at bedside. Pt states she did not get enough sleep last night. She worked with PT and OT this am.  She still has all her pills crushed and mixed with chocolate ice cream and lactaid milk.

## 2020-03-09 NOTE — PLAN OF CARE
Patient is A&O x4. Negative. VSS. On tele-NSR. On RA. IV-SL. C/o generalized pain, scheduled tramadol given. Accuchecks. Electrolyte protocol. Pt refused midnight vitals early throughout the night. In the AM pt refused vitals.    Pt refused to and symptoms of electrolyte imbalances  - Administer electrolyte replacement as ordered  - Monitor response to electrolyte replacements, including rhythm and repeat lab results as appropriate  - Fluid restriction as ordered  - Instruct patient on fluid and preferred)  - Offer food and liquids at a slow rate  - Straws OK  - Encourage small bites of food and small sips of liquid  - Offer pills one at a time, crush or deliver with applesauce as needed  - Discontinue feeding and notify MD (or speech pathologist)

## 2020-03-09 NOTE — PHYSICAL THERAPY NOTE
PHYSICAL THERAPY TREATMENT NOTE - INPATIENT    Room Number: 9427/2846-B     Session: 1   Number of Visits to Meet Established Goals: 5    Presenting Problem: symptomatic anemia    Problem List  Principal Problem:    Symptomatic anemia  Active Problems: AM-PAC '6-Clicks' INPATIENT SHORT FORM - BASIC MOBILITY  How much difficulty does the patient currently have. ..  -   Turning over in bed (including adjusting bedclothes, sheets and blankets)?: Unable   -   Sitting down on and standing up from a EXERCISES  Lower Extremity Ankle pumps  Knee extension  RLE only     Upper Extremity  - open/close     Position Sitting     Repetitions   10   Sets   1     Patient End of Session: Up in chair;Needs met;Call light within reach;RN aware of session/findin

## 2020-03-09 NOTE — DIETARY NOTE
[address] [place] [address]     Admitting diagnosis:  Vaginal bleeding [N93.9]  Symptomatic anemia [D64.9]    Ht: 162.6 cm (5' 4.02\")  Wt: 83.7 kg (184 lb 8 oz). This is 154 % of IBW  Body mass index is 31.65 kg/m².   IBW: 54.5 k

## 2020-03-09 NOTE — OCCUPATIONAL THERAPY NOTE
OCCUPATIONAL THERAPY TREATMENT NOTE - INPATIENT     Room Number: 8503/8093-C  Session: 1/5   Number of Visits to Meet Established Goals: 5    Presenting Problem: symptomatic anemia(s/p hysterectomy 3/4/2020)    History related to current admission: Pt is 7 SUBJECTIVE  \"I can try. \" re: sitting EOB. Patient self-stated goal is not stated.     OBJECTIVE  Precautions: Limb alert - left    WEIGHT BEARING RESTRICTION  Weight Bearing Restriction: None                PAIN ASSESSMENT  Rating: Unable to rate provided; All patient questions and concerns addressed; Alarm set; Other (Comment)(caregiver present)    ASSESSMENT   Pt seen today for trunk control theract and transfer training.  Pt continues to progress with engagement in treatment session activities and r

## 2020-03-09 NOTE — CONSULTS
[facility]  Report of Inpatient Wound Care Consultation     [facility] Patient Status:  Inpatient    1945 MRN [id number]   [address] [id number] Attending Evlein Hillman MD   Hosp Day # 8 PCP Shemar Coto MD .0 [phone]     No results found for: PREALBUMIN    Imaging:  Imaging studies reviewed: N/A    Microbiology:  Culture taken: No    ASSESSMENT:    Assessment completed with someone: No    Lower Extremity Assessment:   Lower extremity assessment wound a direct result of an accident?   No    Wound Condition: Acute  Wound Status: Not Healed    Wound Measurements:    Wound measurements taken: Yes   Length (cm): 1.3  Width (cm): 0.2  Depth (cm): 0.1    Hypergranulation: No    Tunneling:  No     Undermi have any questions about this consultation and plan of care. Time Spent 30 Minutes.     Thank you,     AMBROSE BadilloCibola General Hospitalkevin 46  Yasmin Amezquita 12  [place], [address]  6

## 2020-03-09 NOTE — PLAN OF CARE
A/O x 4.  RA.  O2 at night for comfort. Regular diet, poor appetite. Particular with care. Slight vaginal bleeding noted when pt was changed. MD aware. Possible d/c tomorrow to [facility]. QID accucheck.     Problem: Patient/Family Goals  Goal: Patien precautions during self-care     Outcome: Progressing

## 2020-03-09 NOTE — PROGRESS NOTES
SARAH[facility] Hospitalist Progress Note     [facility]      SUBJECTIVE:  Had small amount of vaginal bleeding this morning  Otherwise no other issues  Eating ok, just feels tired today    OBJECTIVE:  Temp:  [98.2 °F (36.8 °C)-99.2 °F (37.3 °C)] 98.2 °F (36.8 Imaging:  Mri Brain (cpt=70551)    Result Date: 2/7/2020  PROCEDURE:  MRI BRAIN (CPT=70551)  COMPARISON:  [facility] , CT, CT STROKE BRAIN (NO IV)(CPT=70450), 2/04/2020, 14:32. RITA , MR, MRI STROKE BRAIN DWI ONLY(NO IV)(CPT=70551), 2/05/2020, 15:16. created to optimize visualization of vascular anatomy. Dose reduction techniques were used. Dose information is transmitted to the [facility] [employer] of Radiology) NRDR ([address]) which includes the Dose Index Registry.   PATIENT approximately 65% stenosis within the proximal right popliteal artery. Segmental stenosis is noted throughout the popliteal artery. There is a patent origin of the right anterior tibial artery.   The anterior tibial artery then occludes in the upper calf There is fatty infiltration of the liver. No focal hepatic mass. BILIARY:  There is vicarious excretion of contrast seen within the lumen of the gallbladder. There are numerous gallstones seen within the gallbladder lumen.   No pericholecystic inflammatio patent. 2. 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 The distal right superficial femoral artery reconstitutes at the adductor canal from collaterals in the thigh from the profunda femoral artery.   There is moderate stenosi MD on 2/17/2020 at 13:45             Meds:   No current outpatient medications on file.       acetaminophen (TYLENOL) tab 650 mg, 650 mg, Oral, Q12H PRN  traMADol HCl (ULTRAM) tab 50 mg, 50 mg, Oral, 2 times per day  acetaminophen (TYLENOL) tab 650 mg, 650 Min PRN    Or  glucose (DEX4) oral liquid 30 g, 30 g, Oral, Q15 Min PRN    Or  Glucose-Vitamin C (DEX-4) chewable tab 8 tablet, 8 tablet, Oral, Q15 Min PRN  Insulin Aspart Pen (NOVOLOG) 100 UNIT/ML flexpen 1-5 Units, 1-5 Units, Subcutaneous, TID CC and HS labetalol not to be started yet given BP high 120s overnight (although now 140s-150s this morning)     # Recent CVA - left occipital/parietal/frontal CVA  # Hx prior CVA with residual left sided hemiparesis  - Asa, plavix, statin  - neuro and neuro IR saw

## 2020-03-09 NOTE — PROGRESS NOTES
Seen and examined. Reviewed weekend course with Dr. Martino Born.     Feels okay - unfortunately had some bright red vaginal spotting this am    Blood pressures adequate - maintaining NSR    Lungs clear  Ht RRR  abd soft  Ext left foot essentially unchanged in

## 2020-03-10 NOTE — PLAN OF CARE
Patient is A&O x4.   BP elevated, refused to recheck BP. Pt had an episode of emesis. Pt extremely upset. Zofran prn given. Pt refused night time meds after several attempts of offering.  Pt refused insulin (MD notified) and lovenox as well even though Recommend marquita lift for OOB transfers - Pt still refusing use of lift equipment   Outcome: Progressing     Problem: Impaired Activities of Daily Living  Goal: Achieve highest/safest level of independence in self care  Description  Interventions:  - Assess

## 2020-03-10 NOTE — CM/SW NOTE
Plan of care reviewed for care coordination. Pt is currently being evaluated for fever last night, blood and urine cultures are pending. Had nausea and episode of emesis as well.      Spoke to pt at bedside while daughter, Conor Kay and Disha Agrawal by phone and

## 2020-03-10 NOTE — PLAN OF CARE
Assumed care of patient at [address] [place] [address]. Patient A&Ox4, depressed & demanding at times. L sided paralysis & facial droop from previous CVA. On RA. NSR/ST on telemetry. 100.1 temperature this morning, MD aware. Blood & urine cx, CXRay, & CT Abd/pel ordered.   Pa mobility/gait  Description  Interventions:  - Assess patient's functional ability and stability  - Promote increasing activity/tolerance for mobility and gait  - Educate and engage patient/family in tolerated activity level and precautions  - Recommend indira

## 2020-03-10 NOTE — PROGRESS NOTES
Patient very particular about medication administration. This RN was unable to administer patient's evening medications of tramadol, magnesium and zyrtec.   This RN brought in the ice cream from the freezer that was brought in by a family member and patien

## 2020-03-10 NOTE — PROGRESS NOTES
AB Hospitalist Progress Note     [facility]      SUBJECTIVE:  Low grade temp overnight  Nauseated and had episode of emesis    OBJECTIVE:  Temp:  [97.7 °F (36.5 °C)-100.1 °F (37.8 °C)] 97.7 °F (36.5 °C)  Pulse:  [105-113] 113  Resp:  [14-21] 21  BP MRI BRAIN (CPT=70551)  COMPARISON:  [facility] , CT, CT STROKE BRAIN (NO IV)(CPT=70450), 2/04/2020, 14:32. [facility] , MR, MRI STROKE BRAIN DWI ONLY(NO IV)(CPT=70551), 2/05/2020, 15:16.   INDICATIONS:  evaluate for stroke  TECHNIQUE:  MRI of the brain was perform were used. Dose information is transmitted to the [facility] [employer] [facility]iology) NRDR ([address]) which includes the Dose Index Registry.   PATIENT STATED HISTORY:(As transcribed by Technologist)  Patient presents for evaluation stenosis is noted throughout the popliteal artery. There is a patent origin of the right anterior tibial artery. The anterior tibial artery then occludes in the upper calf with segmental areas of reconstitution.   The distal anterior tibial artery is occl vicarious excretion of contrast seen within the lumen of the gallbladder. There are numerous gallstones seen within the gallbladder lumen. No pericholecystic inflammation or gallbladder wall thickening. No ductal dilatation.  PANCREAS:  There is pancreat The distal right superficial femoral artery reconstitutes at the adductor canal from collaterals in the thigh from the profunda femoral artery. There is moderate stenosis in the native right popliteal artery.   There is 1 vessel peroneal artery runoff to t file.      traMADol HCl (ULTRAM) tab 50 mg, 50 mg, Oral, TID  acetaminophen (TYLENOL) tab 650 mg, 650 mg, Oral, Q12H PRN  acetaminophen (TYLENOL) tab 650 mg, 650 mg, Oral, BID  Pantoprazole Sodium (PROTONIX) 40 mg in Sodium Chloride 0.9 % 10 mL IV push, 40 C (DEX-4) chewable tab 8 tablet, 8 tablet, Oral, Q15 Min PRN  Insulin Aspart Pen (NOVOLOG) 100 UNIT/ML flexpen 1-5 Units, 1-5 Units, Subcutaneous, TID CC and HS       Assessment/Plan:     Ms. Kassie Fulton with PMH of pelvic mass s/p bilateral salpingo-oopherect Previously got very drowsy with norco, hold that.     # CAD s/p Recent STEMI with PCI to RCA  - continue ASA/plavix per cardiology --> transitioned to just plavix along with lovenox  - continue home statin  - monitor on tele  - cards c/s  - labetalol going

## 2020-03-10 NOTE — PROGRESS NOTES
[facility]  Cardiology Progress Note    Subjective:  No chest pain or shortness of breath. Feels nauseas. Patient had a low grade fever last night. Cultures were sent.      Objective:  /73 (BP Location: Left arm)   Pulse 113   Temp 97.7 °F (36.5 · Acute on chronic LLE ischemia. Left popliteal thrombus. On lovenox  · Medtronic PPM d/t high grade AV block. 4/5/2018    Plan:  · Continue plavix and lovenox, no asa  · Patient had some HTN last night, 176/97. Otherwise SBP has been in the 140-150s.  Mitesh Belle

## 2020-03-11 NOTE — PLAN OF CARE
Pt. A&O x4, on RA, tele shows sinus tachy. VSS. Offered to turn Q2. Pillows elevating lower extremities and left arm. Pt. Very selective regarding which medications she wanted to take. Pt. C/o nausea throughout the shift, medication given per [facility].  Pt. Refu status  Description  INTERVENTIONS  - Assess for and report changes in neurological status  - Initiate measures to prevent increased intracranial pressure  - Maintain blood pressure and fluid volume within ordered parameters to optimize cerebral perfusion

## 2020-03-11 NOTE — PROGRESS NOTES
[facility] Hospitalist Progress Note     PCP: Mikki Gil MD    SUBJECTIVE:  No CP, SOB, or N/V.    OBJECTIVE:  Temp:  [97.8 °F (36.6 °C)-99.3 °F (37.4 °C)] 98.7 °F (37.1 °C)  Pulse:  [] 93  Resp:  [18-26] 26  BP: (145-159)/(55-85) 147/73    Intake/Outp 03/10/20  2208 03/11/20  0910   PGLU 158* 157* 141* 152* 125*         Meds:     • Labetalol HCl  50 mg Oral 2 times per day   • piperacillin-tazobactam  3.375 g Intravenous Q8H   • traMADol HCl  50 mg Oral TID   • acetaminophen  650 mg Oral BID   • pantopr re-examined by gyn given recurrent bleeding prior to discharge  - otherwise as above with OR 3/4     # Fever  - temp of 100.1 yest  - Repeat cultures - 1/2 with GPCs in clusters - contaminant?  - CT imaging of abdomen with pelvic fluid collection  - will c hemiparesis  - Asa, plavix, statin  - neuro and neuro IR saw last admit -- does have severe left ICA stenosis, medical management     # Severe Left ICA stenosis  - as above, medical management  - per daughter is sensitive to drops in BP, does better with S

## 2020-03-11 NOTE — PROGRESS NOTES
Feels okay this am. No abdominal pain or nausea    Tmax 100.1 on 3/10/20 at 7:409 am    150/55  108 regular    Lungs clear  Ht RR mild tachycardia  abd soft - no RUQ tenderness  Ext no change in appearance of foot    CT results reviewed    14/1.1     Hgb 1

## 2020-03-11 NOTE — CONSULTS
[facility] Dosing Service    Initial Pharmacokinetic Consult for Vancomycin Dosing     Dianna Allred is a 69year old female who is being treated for bacteremia. Pharmacy has been asked to dose Vancomycin by Dr. Marisol Watson.     She is allergic to baclofen; r

## 2020-03-11 NOTE — CONSULTS
INFECTIOUS DISEASE CONSULT NOTE    Dianna Allred Patient Status:  Inpatient    1945 MRN [id number]   [facility] 3NE-A Attending Perri Saucedo MD   Hosp Day # 10 PCP Radha Matos • Stroke [facility])      Past Surgical History:   Procedure Laterality Date   • CARDIAC PACEMAKER PLACEMENT     • CATARACT      left eye   • HYSTERECTOMY     • OTHER      Pacemaker   • VAGINAL HYSTERECTOMY WITH BILATERAL [address] N/A 3/4/2020    Performed (COMPAZINE) rectal suppository 25 mg, 25 mg, Rectal, Q12H PRN  •  diphenhydrAMINE HCl (BENADRYL) injection 12.5 mg, 12.5 mg, Intravenous, Q4H PRN  •  magnesium oxide (MAG-OX) tab 400 mg, 400 mg, Oral, TID CC  •  ondansetron HCl (ZOFRAN) injection 4 mg, 4 m for dysuria, hematuria, frequency  Integument: Negative for rash, pruritus. Musculoskeletal: Negative for myalgias, arthralgias, arthritis.   Neurological: Negative for headaches, dizziness, focal neuro deficits  Behavioral/Psych: Negative for anxiety or d BLOODURINE Negative   PHURINE 5.0   PROUR 30 *   UROBILINOGEN <2.0   NITRITE Negative   LEUUR Trace*   WBCUR 5-10*   RBCUR 0-2   BACUR Rare*   EPIUR Moderate*          Microbiology    Reviewed in EMR,   Hospital Encounter on 03/01/20   1.  BLOOD CULTURE evidence for aneurysm involving the visualized portions  of the aorta. Normal caliber of the small bowel. No signs of small bowel obstruction. The colon does not appear obstructed and does not appear to contain an excessive amount of stool.   No rectal f MD on 3/10/2020 at 7:02 PM     Finalized by: Maria R Daniels MD on 3/10/2020 at 7:13 PM          Xr Chest Ap Portable  (cpt=71045)    Result Date: 3/10/2020  PROCEDURE:  XR CHEST AP PORTABLE  (CPT=71045)  TECHNIQUE:  AP chest radiograph was obtained.   COMP Abscess/ infected hematoma. no leukocytosis now. She seemed to be doing better until zosyn (given empirically for possible UTI) was stopped and then developed fever and other systemic symptoms, concerning for an infected fluid collection.  no other obvious

## 2020-03-11 NOTE — CONSULTS
[facility]  Report of Consultation    Gus Cardenas Patient Status:  Inpatient    1945 MRN [id number]   [facility] 3NE-A Attending Ben Tirado MD   [address] Day # 10 PCP David Urbina MD       Reason for Consultation: History:    History reviewed. No pertinent family history. Social History:     reports that she has never smoked. She has never used smokeless tobacco. She reports current alcohol use. She reports that she does not use drugs.     Allergies:      Baclofen 10 mg, 10 mg, Oral, BID with meals  •  Clopidogrel Bisulfate (PLAVIX) tab 75 mg, 75 mg, Oral, Daily  •  diphenhydrAMINE (BENADRYL) cap/tab 25 mg, 25 mg, Oral, Q6H PRN  •  famoTIDine (PEPCID) tab 40 mg, 40 mg, Oral, QAM  •  gabapentin (NEURONTIN) cap 400 mg Insulin Aspart Pen 100 UNIT/ML Subcutaneous Solution Pen-injector, Inject into the skin nightly.  For blood sugar:   <260: 0 units  261-300: 2 units  301-350: 4 units  351-400: 6 units  >400: call physician, Disp: , Rfl:   cetirizine 10 MG Oral Tab, Take 1  aspirin 81 MG Oral Chew Tab, Chew 1 tablet (81 mg total) by mouth daily. , Disp: 30 tablet, Rfl: 0  PEG 3350 Oral Powd Pack, Take 17 g by mouth daily as needed. , Disp: 1 g, Rfl: 0        Review of Systems:    10 point review performed pertinent positives the diaphragm to the pubic symphysis. Dose reduction techniques were used. Dose information is transmitted to the [address] of Radiology) NRDR ([address]) which includes the Dose Index Registry.   PATIENT STATED HISTORY: abdominal wall. Note is made of degenerative changes present in the spine. CONCLUSION:   1. Dilated gallbladder, with hyperdense material as well as stones.   The hyperdense material could reflect hyperdense biliary sludge, or vicarious excretion of intrav MD on 3/10/2020 at 11:23 AM     Finalized by: Daniel Lopez MD on 3/10/2020 at 11:25 AM          Xr Chest Ap Portable  (cpt=71045)    Result Date: 2/17/2020  PROCEDURE:  XR CHEST AP PORTABLE  (CPT=71045)  TECHNIQUE:  AP chest radiograph was obtained. injury ([address]     Hyperglycemia     Vaginal bleeding      Impression:    70 y/o s/p robotic hysterectomy, BSO with subsequent evacuation of hematoma, repair of vaginal dehiscence  With nausea/emesis  CT scan revealing distended gb without signs of acute cho

## 2020-03-11 NOTE — PLAN OF CARE
Patient is A&Ox4. Irritable. Occasionally refusing nursing care (vitals, accuchecks). 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 Caregiver at bedside. NSR/ST on telemetry with HR 90's-low 100's, denies CP or SOB. VSS, afebrile.     Hgb Progressing

## 2020-03-11 NOTE — PROGRESS NOTES
[facility]  Progress Note    Martinadamien Schirmer Patient Status:  Inpatient    1945 MRN [id number]   [facility] 3NE-A Attending Peterson Rust MD   [facility] Day # 10 PCP Mayela Amos MD       SUBJECTIVE:  Comfortable  Has little spottin 80 mg, 80 mg, Subcutaneous, 2 times per day  morphINE sulfate (PF) 4 MG/ML injection 1 mg, 1 mg, Intravenous, Q4H PRN  Zolpidem Tartrate (AMBIEN) tab 5 mg, 5 mg, Oral, Nightly PRN  ondansetron HCl (ZOFRAN) injection 4 mg, 4 mg, Intravenous, Q8H PRN    Or service.   Some air in the fluid collection may possibly related to her vaginal dehiscence  Agree with the plan to proceed with drainage per interventional radiology and continue antibiotics  No surgical intervention is planned    Questions/concerns were [employer]

## 2020-03-12 NOTE — PROGRESS NOTES
INFECTIOUS DISEASE PROGRESS NOTE    Latasha Schirmer Patient Status:  Inpatient    1945 MRN [id number]   [facility] 3NE-A Attending Peterson Rust MD   [facility] Day # 11 TERRELL Preston TID CC  •  ondansetron HCl (ZOFRAN) injection 4 mg, 4 mg, Intravenous, Q6H PRN  •  Metoclopramide HCl (REGLAN) injection 5 mg, 5 mg, Intravenous, Q8H PRN  •  cetirizine (ZYRTEC) tab 10 mg, 10 mg, Oral, BID with meals  •  diphenhydrAMINE (BENADRYL) cap/tab arthritis or deformity  Integument: + dusky discoloration of toes on L foot. Foot is cool. PIV to RUE w/o signs of infection.     Laboratory Data:    Recent Labs   Lab 03/10/20  0615 03/11/20  0714 03/12/20  0714   RBC 3.87 3.49* 3.24*   HGB 11.4* 10.2* 9.4 symptoms, concerning for an infected fluid collection. CRP 1.54. Procalcitonin 0.46. No other obvious source of infection (see below)  2.  Staph sp bacteremia- no central lines although does have a PPM, still suspect this may more likely be a contaminant  3

## 2020-03-12 NOTE — PLAN OF CARE
Assumed patient care at 1900  Patient A&O x 4  Irritable at times  Complaints of chronic pain but refused Tylenol and Ultram overnight  VSS  Tele-SR  Afebrile  Lovenox Sub Q  Mepilex to sacrum C/D/I- placed for redness  (+) UTI- IV zosyn  (+) Blood culture activity/tolerance for mobility and gait  - Educate and engage patient/family in tolerated activity level and precautions  - Recommend marquita lift for OOB transfers - Pt still refusing use of lift equipment   Outcome: Progressing     Problem: Impaired Activ

## 2020-03-12 NOTE — PROGRESS NOTES
[facility] Hospitalist Progress Note     PCP: Husam Nixon MD    SUBJECTIVE:  No CP, SOB, or N/V. Pt feels well today. No pain other than in her left foot.     OBJECTIVE:  Temp:  [97.3 °F (36.3 °C)-99.2 °F (37.3 °C)] 98.9 °F (37.2 °C)  Pulse:  [] 82  Re Lab 03/11/20  0910 03/11/20  1218 03/11/20  1831 03/11/20  2048 03/12/20  0826   PGLU 125* 141* 187* 133* 99         Meds:     • vancomycin  15 mg/kg Intravenous Q24H   • Labetalol HCl  50 mg Oral 2 times per day   • piperacillin-tazobactam  3.375 g Intr salpingo-oophorectomy with Dr. Rhina Mccrary 2/4  - pathology returned benign  - was re-examined by gyn given recurrent bleeding prior to discharge  - otherwise as above with OR 3/4     # Fever  - resolved  - Repeat cultures - 1/2 with GPCs in clusters - contamin left ICA stenosis, medical management     # Severe Left ICA stenosis  - as above, medical management  - per daughter is sensitive to drops in BP, does better with SBPs closer to 130-- Family very concerned about BP drop because her clinical condition in th

## 2020-03-12 NOTE — PROGRESS NOTES
[facility]  Cardiology Progress Note    Subjective:  No chest pain or shortness of breath.  Left foot pain    Objective:  /56 (BP Location: Left arm)   Pulse 82   Temp 98.9 °F (37.2 °C) (Oral)   Resp 20   Ht 5' 4.02\" (1.626 m)   Wt 184 lb 8 oz ( 2. Change in the appearance of the pelvis. Previously a large hyperdense hematoma was present, on the CT scan from 1 month ago, but currently there is a smaller but more complex collection containing fluid and air in the hysterectomy bed.   The origin of t If the clinical decision is made to tap the fluid collection lovenox may certainly be held - Holding plavix however only 5 weeks after her MI and stenting procedure would increase her risk of subacute stent thrombosis with reinfarction and death - this wou Other than symptoms associated with present events the following is reported:  General:  No fever, no chills, no weight loss.  HEENT:  No sore throat.  Respiratory: No cough, no dyspnea, no wheeze.  Cardiovascular:  No orthopnea. +Palpitations.   GI: No abdominal pain, no nausea/vomiting, no diarrhea.  : No dysuria, no frequency, no urgency.  Musculoskeletal:  No joint pain, no myalgia.  Endocrine:  No generalized weakness, no polyuria.  Neurological:  No headache, no focal weakness. +Lightheadedness.   Psychiatric: No emotional stress, no depression.  Derm:  No rash.  Heme:  No bruising, no bleeding.

## 2020-03-12 NOTE — PLAN OF CARE
Assumed pt care at 0730. A&Ox4, irritable. VSS. 2L O2 NC. NSR on tele. L limb precautions maintained d/t L paralysis. No signs of bleeding noted. Repeat blood cx no growth day 1. IV Zosyn q8h & IV Vanco q24h. R wrist PIV SL.    Regular diet, pt t Problem: Impaired Activities of Daily Living  Goal: Achieve highest/safest level of independence in self care  Description  Interventions:  - Assess ability and encourage patient to participate in ADLs to maximize function  - Promote sitting position i

## 2020-03-12 NOTE — OCCUPATIONAL THERAPY NOTE
Attempted to see pt this AM. Pt eating breakfast and requested writer return at later time. OT will follow up at later time as schedule allows and pt is appropriate.

## 2020-03-13 NOTE — PLAN OF CARE
Resumed care at 1930  Pt A&Ox4 irritable  Vs wnl, RA, NSR  C/o generalized pain, tylenol #3 given  Bedrest, total lift, encouraged pt to be repositioned multiple times throughout shift, pt refused  Accucheck QID, hs = 115 s/w Dr. Thomas Ruiz, pt requesting she activity/tolerance for mobility and gait  - Educate and engage patient/family in tolerated activity level and precautions  - Recommend marquita lift for OOB transfers - Pt still refusing use of lift equipment   Outcome: Progressing     Problem: Impaired Activ

## 2020-03-13 NOTE — PROGRESS NOTES
[facility]  Cardiology Progress Note    Subjective:  No chest pain or shortness of breath.     Objective:  /71 (BP Location: Left arm)   Pulse 82   Temp 98.6 °F (37 °C) (Oral)   Resp 18   Ht 5' 4.02\" (1.626 m)   Wt 184 lb 8 oz (83.7 kg)   SpO2 9 labetalol. Patient and family states that her BPs are best in the 140s. Patient feels quite dizzy and sick if BP drops lower than 484 systolic. · HLD: statin therapy  · Acute on chronic LLE ischemia. Left popliteal thrombus.  On lovenox  · Medtronic PPM d/

## 2020-03-13 NOTE — PROGRESS NOTES
INFECTIOUS DISEASE PROGRESS NOTE    Wanda Wyman Patient Status:  Inpatient    1945 MRN [id number]   [facility] 3NE-A Attending Evelin Hillman MD   [address] [facility] Day # 12 PCP Stefano Montejo mg, 10 mg, Oral, Q12H PRN **OR** prochlorperazine (COMPAZINE) rectal suppository 25 mg, 25 mg, Rectal, Q12H PRN  •  diphenhydrAMINE HCl (BENADRYL) injection 12.5 mg, 12.5 mg, Intravenous, Q4H PRN  •  magnesium oxide (MAG-OX) tab 400 mg, 400 mg, Oral, TID C pocket w/o signs of infection  Abdomen: Soft, nontender, nondistended. Positive bowel sounds. Musculoskeletal: Full range of motion of all extremities. No arthritis or deformity  Integument: + dusky discoloration of toes on L foot. Foot is cool.  PIV to infected hematoma. No leukocytosis now. She seemed to be doing better until zosyn (given empirically for possible UTI) was stopped and then developed fever and other systemic symptoms, concerning for an infected fluid collection. CRP 1.54. Procalcitonin 0.

## 2020-03-13 NOTE — OCCUPATIONAL THERAPY NOTE
OCCUPATIONAL THERAPY TREATMENT NOTE - INPATIENT     Room Number: 5860/6383-H  Session: 2/5   Number of Visits to Meet Established Goals: 5    Presenting Problem: symptomatic anemia(s/p hysterectomy 3/4/2020)    History related to current admission: Pt is 9 SUBJECTIVE  \"I can try. \" re: sitting EOB. Patient self-stated goal is not stated.     OBJECTIVE  Precautions: Limb alert - left    WEIGHT BEARING RESTRICTION  Weight Bearing Restriction: None                PAIN ASSESSMENT  Rating: Unable to rate present)    ASSESSMENT   Pt seen today for RUE fine motor coordination therex. Pt continues to engage in treatment session with preferred activities.  The pt continues to funciton below previous functional level and would benefit from skilled inpatient OT t

## 2020-03-13 NOTE — PROGRESS NOTES
[facility] Hospitalist Progress Note     PCP: Jacquie Valiente MD    SUBJECTIVE:  No CP, SOB, or N/V.   Of note, gluc last night 114 and 5U levemir given instead of 10    OBJECTIVE:  Temp:  [98.3 °F (36.8 °C)-99 °F (37.2 °C)] 98.6 °F (37 °C)  Pulse:  [76-94] 82  Re 03/13/20  0939   PGLU 99 128* 106* 114* 109*           Meds:     • Clopidogrel Bisulfate  75 mg Oral Daily   • Labetalol HCl  10 mg Intravenous 2 times per day   • vancomycin  15 mg/kg Intravenous Q24H   • piperacillin-tazobactam  3.375 g Intravenous Q8H given recurrent bleeding prior to discharge  - otherwise as above with OR 3/4     # Fever  - resolved  - Repeat cultures - 1/2 with GPCs in clusters - contaminant?  - surveillance cx NGTD  - monitor temps  -pt has refused HIDA SCAN -distended gb may have b ICA stenosis  - as above, medical management  - per daughter is sensitive to drops in BP, does better with SBPs closer to 130-- Family very concerned about BP drop because her clinical condition in the setting of her vascular disease seems to worsen <130.

## 2020-03-13 NOTE — IMAGING NOTE
Spoke w/pt nurse Lisbeth Akins and she stated that procedure for abscess drain is \" on hold\"  As pt is back on blood thinner.  I asked that she get confirmation of whether procedure needs to be done and get cancellation of order or to notify us if it does indee

## 2020-03-13 NOTE — PROGRESS NOTES
Patient declined straight cath or to be placed on bedpen to produce urine sample for testing. Dr Mark Belle notified.

## 2020-03-13 NOTE — PHYSICAL THERAPY NOTE
PHYSICAL THERAPY TREATMENT NOTE - INPATIENT    Room Number: 7938/6160-L     Session: 2   Number of Visits to Meet Established Goals: 5    Presenting Problem: symptomatic anemia    Problem List  Principal Problem:    Symptomatic anemia  Active Problems: TOLERANCE                         O2 WALK                    AM-PAC '6-Clicks' INPATIENT SHORT FORM - BASIC MOBILITY  How much difficulty does the patient currently have. ..  -   Turning over in bed (including adjusting bedclothes, sheets and blankets)?: Un pumps  Knee extension  RLE only     Upper Extremity  - open/close     Position Sitting     Repetitions   10   Sets   1     Patient End of Session: Needs met;Call light within reach;RN aware of session/findings; All patient questions and concerns address

## 2020-03-14 NOTE — PROGRESS NOTES
[facility]    Progress Note    Nancy George Patient Status:  Inpatient    1945 MRN [id number]   [facility] 3NE-A Attending Juvenal Green DO   Hosp Day # 15 PCP Alyssa De La Cruz MD       Subjective:     Having nausea denies having chronic kidney disease who presented to the emergency room on March 1 with anemia.       ROLANDO:  - Baseline 0.98  - Had risen to 1.6  - improved with IVF will resume.  - Will check urine studies. - Will Check Renal ultrasound at this time.   - avoid Nephroto

## 2020-03-14 NOTE — PLAN OF CARE
Patient is a&ox4.  and RA. Tele and NSR. Complaints of nausea. Zofran scheduled. Refusal of most care. Refused most morning medication. Refusing straight cath or purewick to collect urine. Explain need for urine for studies and strict I&O's.  Patient con increasing activity/tolerance for mobility and gait  - Educate and engage patient/family in tolerated activity level and precautions  - Recommend marquita lift for OOB transfers - Pt still refusing use of lift equipment   Outcome: Progressing     Problem: Imp

## 2020-03-14 NOTE — PLAN OF CARE
Received patient alert and oriented. Patient told me right away that she should not be woken up for Vitals, turn and diaper change, she will call when she is ready. Left side weakness. On room air, clear lungs. NSR, Electrolyte protocol.  On Lovenox for VTE Outcome: Progressing     Problem: Impaired Activities of Daily Living  Goal: Achieve highest/safest level of independence in self care  Description  Interventions:  - Assess ability and encourage patient to participate in ADLs to maximize function  - Pro

## 2020-03-14 NOTE — PROGRESS NOTES
[facility] Hospitalist Progress Note     PCP: Anthony Wei MD    SUBJECTIVE:  No CP, SOB. No abdominal pain but she has been complaining of nausea. She last had T#3 yesterday morning. OBJECTIVE:  Temp:  [97.7 °F (36.5 °C)-98.4 °F (36.9 °C)] 97.7 °F (36. PGLU 114* 109* 135* 133* 125*       Meds:     • insulin detemir  5 Units Subcutaneous Nightly   • Clopidogrel Bisulfate  75 mg Oral Daily   • Labetalol HCl  10 mg Intravenous 2 times per day   • piperacillin-tazobactam  3.375 g Intravenous Q8H   • acetam now but consider reglan  -she had refused HIDA which was previously ordered for incidentally noted distended appearing gb     # Hx Pelvic Mass/cyst  - s/p robotic assisted hysterectomy with b/l salpingo-oophorectomy with Dr. Ahmet Chiu 2/4  - pathology returne Left ICA stenosis  - as above, medical management  - per daughter is sensitive to drops in BP, does better with SBPs closer to 130-- Family very concerned about BP drop because her clinical condition in the setting of her vascular disease seems to worsen <

## 2020-03-15 NOTE — PLAN OF CARE
Received patient awake in bed. Has left sided weakness. Both arms are swollen. On room air, clear lungs. NSR. Electrolyte protocol. Has severe generalized pain and she received T3 for that. No SCD, has left popliteal thrombus. On Lovenox.  Incontinent, diap independence in self care  Description  Interventions:  - Assess ability and encourage patient to participate in ADLs to maximize function  - Promote sitting position while performing ADLs such as feeding, grooming, and bathing  - Educate and encourage pat

## 2020-03-15 NOTE — PROGRESS NOTES
[facility] Hospitalist Progress Note     PCP: Claudine Cantu MD    SUBJECTIVE:  No CP, SOB, or N/V.    OBJECTIVE:  Temp:  [97.7 °F (36.5 °C)-99.1 °F (37.3 °C)] 98.9 °F (37.2 °C)  Pulse:  [76-97] 96  Resp:  [20-24] 20  BP: (173-184)/(64-81) 180/64    Intake/Outpu 03/14/20  0709 03/15/20  0632   ALT 14 12*  --    AST 19 15  --    ALB 2.2* 2.1* 2.0*       Recent Labs   Lab 03/14/20  0934 03/14/20  1158 03/14/20  1749 03/14/20  2018 03/15/20  0902   PGLU 125* 133* 121* 122* 115*         Meds:     • hydrALAzine HCl  10 alk phos is 171 with nml AST/ALT but was higher at 200 and 278 earlier this month. In Feb alk phos was normal however  -unclear etiology presently - low clinical suspicion for biliary process given abscence of RUQ/abd pain  -?  Constipation  -check A1c - l monitor on tele  - cards c/s  - labetalol going to be started today at low dose, was discussed with daughter      #delirium Martin Berger     # Recent CVA - left occipital/parietal/frontal CVA  # Hx prior CVA with residual left sided hemiparesis  - brandon No spent with patient and coordinating care:  35 minutes    Thank Deepika Gandara [facility] Hospitalist  Pager: [phone]  Answering Service: [phone]

## 2020-03-15 NOTE — PROGRESS NOTES
[facility]    Progress Note    Andrew Side Patient Status:  Inpatient    1945 MRN [id number]   [facility] 3NE-A Attending Harinder Reese, DO   Hosp Day # 914 [facility] PCP Harjit Stockton MD       Subjective:     Improved renal function toda bilateral salpingo-oophorectomy on February 4 (pathology benign), course was complicated by STEMI/CVA/lower extremity ischemia due to popliteal vein occlusion, she also has a history of type 2 diabetes and chronic kidney disease who presented to the emerge

## 2020-03-16 NOTE — PROGRESS NOTES
Pt noted to have vaginal bleeding and large blood clots from vagina. Dr. Jamaica Bernal notified. MD recommending pt be off Lovenox and any other anticoagulants. Will see pt tomorrow. 1507: cards APN notified for reconsultation.  GI was notified of consult by RITA HALL

## 2020-03-16 NOTE — PROGRESS NOTES
Pt refusing all oral medications at this time. 1011: Elevated BP. Pt refusing PRN hydralazine and scheduled Labetalol. Pt requesting only IV Labetalol. Renal paged.   1108: Renal paged for 2nd time

## 2020-03-16 NOTE — CM/SW NOTE
Care Progression Note:  Active Acute Medical Issue:   Acute on chronic anemia due to vaginal bleeding-s/p vaginal dehiscence repair, blood clots evacuated-now Hgb stable,  Nausea/abd pain-?etiology  HTN-elevated    Other Contributing Medical Factors/Dx. Linda Moreira

## 2020-03-16 NOTE — PROGRESS NOTES
[facility] Cardiology Progress Note    Patient seen and examined.  Chart reviewed.  Discussed with RN. Feels nauseated and has refused Plavix today.     BP (!) 188/89   Pulse 110   Temp 98.8 °F (37.1 °C) (Oral)   Resp (!) 33   Ht 64.02\"   Wt 178 lb 9.6 oz   Sp PRN  [COMPLETED] vancomycin IVPB premix 2g in 0.9% NaCl 500 mL, 25 mg/kg, Intravenous, Once  Prochlorperazine Edisylate (COMPAZINE) injection 10 mg, 10 mg, Intravenous, Q6H PRN  Piperacillin Sod-Tazobactam So (ZOSYN) 3.375 g in dextrose 5 % 100 mL ADD-vant 20 mg, 20 mg, Oral, Daily  Saline Nasal Spray (SALINE MIST) 2 spray, 2 spray, Nasal, PRN  glucose (DEX4) oral liquid 15 g, 15 g, Oral, Q15 Min PRN    Or  Glucose-Vitamin C (DEX-4) chewable tab 4 tablet, 4 tablet, Oral, Q15 Min PRN    Or  dextrose 50 % inje

## 2020-03-16 NOTE — PROGRESS NOTES
[facility]    Progress Note    Opal Burrows Patient Status:  Inpatient    1945 MRN [id number]   [facility] 3NE-A Attending Dahiana Rivas, DO   Hosp Day # 15 PCP Jennifer Huynh MD       Subjective:     Improved renal function toda on February 4 (pathology benign), course was complicated by STEMI/CVA/lower extremity ischemia due to popliteal vein occlusion, she also has a history of type 2 diabetes and chronic kidney disease who presented to the emergency room on March 1 with anemia.

## 2020-03-16 NOTE — PROGRESS NOTES
INFECTIOUS DISEASE PROGRESS NOTE    Laila Cabral Patient Status:  Inpatient    1945 MRN [id number]   [facility] 3NE-A Attending Leopoldo Donaldson MD   [facility] Day # 15 PCP Kamari Mchugh Sodium Chloride 0.9 % 10 mL IV push, 40 mg, Intravenous, Daily  •  Enoxaparin Sodium (LOVENOX) 80 MG/0.8ML injection 80 mg, 80 mg, Subcutaneous, 2 times per day  •  morphINE sulfate (PF) 4 MG/ML injection 1 mg, 1 mg, Intravenous, Q4H PRN  •  Zolpidem Tartr currently breastfeeding. HEENT: no scleral icterus or conjunctival injection. Moist mucous membranes. No thrush  Neck: No lymphadenopathy. Supple. Respiratory: Rhonchi bilaterally- clears with coughing. Decreased at bases  Cardiovascular: S1, S2.   Tachy Time: 03/11/20 12:33 PM   Result Value Ref Range    Blood Culture Result No Growth 5 Days N/A   2. URINE CULTURE, ROUTINE     Status: None    Collection Time: 03/10/20  2:17 PM   Result Value Ref Range    Urine Culture No Growth at 18-24 hrs.  N/A     Blood results. Discussed case with RN, patient, patient's daughter via phone and hospitalist.    Virgilio Pro. Bella Verduzco PA-C    ID ATTENDING ADDENDUM     Pt seen an examined independently. Chart reviewed. Agree with above.  Note has been reviewed by me and shaila

## 2020-03-16 NOTE — PROGRESS NOTES
[facility] hospitalist daily note    Patient was seen/examined on 3/16/20    S; pt with lower abdominal pain today. + nausea/emesis (nonbloody). + cough but denies chest pain or SOB  Also pt reports diarrhea  eval revealed vaginal bleeding with clots.  I spoke wit nausea/emesis reported by the patient  No RUQ abdominal pain during my visit  CT abd showed dilated gallbladder, with hyperdense material as well as stones.    Pt refused HIDA scan (was seen by surgery during admit)  GI consult  Pt is followed by ID and is this is stopped      High complexity, see above    Nabila Lindquist MD  [facility] hospitalist  [phone]    Addendum:     Prolonged service time spent 1 hour.  This was spent on prolonged service between 2 pm and 5pm on 3/16/20,  Time was spent on communicating wi

## 2020-03-16 NOTE — PROGRESS NOTES
Pt seen today. Has been refusing care/therapies. States is bleeding still vaginally. Lovenox has been ordered to be held. Plan: Given inconsistent participation, not appropriate for Acute rehab. Not ready for dc yet.   If participation improve

## 2020-03-16 NOTE — DIETARY NOTE
[address] [place] [address]     Admitting diagnosis:  Vaginal bleeding [N93.9]  Symptomatic anemia [D64.9]    Ht: 162.6 cm (5' 4.02\")  Wt: 81 kg (178 lb 9.6 oz). This is 154 % of IBW  Body mass index is 30.64 kg/m².   IBW: 54.5 k

## 2020-03-16 NOTE — PLAN OF CARE
Received patient asleep in bed. AOx4. She claimed she did not feel good. Refused pain meds, Tylenol, gabapentin and Hydralazine. Pt took all the IV meds and shots. Refused to be changed at 2300 and refused to be woken up for vitals at MN and 0400.  SR on te participate in ADLs to maximize function  - Promote sitting position while performing ADLs such as feeding, grooming, and bathing  - Educate and encourage patient/family in tolerated functional activity level and precautions during self-care     Outcome: P

## 2020-03-16 NOTE — PHYSICAL THERAPY NOTE
PHYSICAL THERAPY TREATMENT NOTE - INPATIENT    Room Number: 1866/2088-X     Session: 3  Number of Visits to Meet Established Goals: 5    Presenting Problem: symptomatic anemia    Problem List  Principal Problem:    Symptomatic anemia  Active Problems: TOLERANCE   194/86 on unaffected arm  181/80 on affected arm       AM-PAC '6-Clicks' INPATIENT SHORT FORM - BASIC MOBILITY  How much difficulty does the patient currently have. ..  -   Turning over in bed (including adjusting bedclothes, sheets and blankets encouragement/education on participation in the beginning of session. The pt performed LE and UE ex AROM on unaffected side with cues to reach max range of motion. PROM on left ue and le.             Patient End of Session: Needs met;Call light within r

## 2020-03-16 NOTE — CONSULTS
[facility]                       Gastroenterology [address] [facility] Gastroenterology    Willy Ronald Patient Status:  Inpatient    1945 MRN [id number]   [facility] 3NE-A Attending Barbara Vides MD   [address] Day # 15 PCP Britney Riley Laterality Date   • CARDIAC PACEMAKER PLACEMENT     • CATARACT      left eye   • HYSTERECTOMY     • OTHER      Pacemaker   • VAGINAL HYSTERECTOMY WITH BILATERAL [address] N/A 3/4/2020    Performed by Nicola Rivas MD at [facility] MAIN OR   • Rahul Hoffman (LASIX) injection 40 mg, 40 mg, Intravenous, Once  [] bisacodyl (DULCOLAX) rectal suppository 10 mg, 10 mg, Rectal, Once PRN  [COMPLETED] 0.9% NaCl infusion, , Intravenous, Once  morphINE sulfate (PF) 4 MG/ML injection 1 mg, 1 mg, Intravenous, Q4H P in dextrose 5 % 100 mL ADD-vantage, 3.375 g, Intravenous, Q8H      Allergies:   Baclofen                OTHER (SEE COMMENTS)    Comment:weakness  Radiology Contrast *    OTHER (SEE COMMENTS)    Comment:Lower lip swelling.  Consider pre-treatment if anicteric  Neck:  Supple without nuchal rigidity  CV: Tachycardic, regular   Resp: Clear to auscultation bilaterally without wheezes; rubs, rhonchi, or rales  Abdomen: Obese, soft, RLQ tenderness, non-distended with the presence of bowel sounds;  No hepatos Patient presents with nausea and vomitting         FINDINGS:       This scan performed without IV or oral contrast, with limitations thereof. Mild atelectasis lung base.      The gallbladder is dilated, 4.5 x 9.0 cm containing hyperdense internal conten intravenous contrast.  No gallbladder wall thickening. Advise correlation for any specific   symptoms related to the gallbladder. 2. Change in the appearance of the pelvis.   Previously a large hyperdense hematoma was present, on the CT scan from 1 mon will follow the patient with you.   Esther Mart, APRN  3:34 PM  3/16/2020  [facility]  [phone]    GI attending addendum:    I have personally seen and examined this patient and agree with above nurse practitioner's assessment and melinda

## 2020-03-17 NOTE — PROGRESS NOTES
RECEIVED PT FROM CTU 3 AT 1430. PT IS AWAKE AND ABLE TO CONVERSE. HR ELEVATED. BP STABLE. ON RA. APN AT THE BEDSIDE. PT MADE COMFORTABLE AND 1ST UNIT OF BLOOD STARTED. PT REFUSING MEDS. DAUGHTER JOANIE UPDATED ON POC.  DR. Madhu Carpio TO SEE FOR DECISION MAKING A

## 2020-03-17 NOTE — CONSULTS
[facility]  Report of Psychiatric Consultation    Juan M Stewart Patient Status:  Inpatient    1945 MRN [id number]   [facility] 4SW-A Attending Genie Osler, MD   [facility] Day # 12 PCP Christal De Leon MD     Date of Admission: 3/1/20 the HIDA scan, she agrees to get it performed. She thought she needed IV contrast for it (has allergy to contrast), but doesn't. 2) She refuses to get formal neuro-psych testing to eval the extent of her suspected cognitive impairment.  She told me that 4.8 x 2.6 x 3.2 cm) the differential would include abscess, hematoma, seroma. Abscess is favored. \" She agreed to get the fluid collection drained tomorrow.  The CT also showed, \"cholelithiasis with interval distention of the gallbladder with wall thicken RN's or techs all the time. She has low energy and motivation and low appetite. Concentration and sleep are fair. She denies hopeless or helpless feelings (once again I think she is minimizing). She has NO suicidal ideation.      Psychiatry Review Systems: Comment:weakness  Radiology Contrast *    OTHER (SEE COMMENTS)    Comment:Lower lip swelling.  Consider pre-treatment if             needed  Lipitor [Atorvastat*    INSOMNIA    Medications:    Current Facility-Administered Medications:   •  0.9% NaCl infusi magnesium oxide (MAG-OX) tab 400 mg, 400 mg, Oral, TID CC  •  cetirizine (ZYRTEC) tab 10 mg, 10 mg, Oral, BID with meals  •  diphenhydrAMINE (BENADRYL) cap/tab 25 mg, 25 mg, Oral, Q6H PRN  •  famoTIDine (PEPCID) tab 40 mg, 40 mg, Oral, QAM  •  gabapentin ( fair  Judgment: fair    Laboratory Data:  Lab Results   Component Value Date    WBC 24.4 03/17/2020    HGB 8.7 03/17/2020    HCT 26.8 03/17/2020    .0 03/17/2020    CREATSERUM 1.39 03/17/2020    BUN 15 03/17/2020     03/17/2020    K 3.8 03/17/ imaging. RETROPERITONEUM:  No mass or adenopathy. BOWEL/MESENTERY:  No visible mass, obstruction, or bowel wall thickening. Trace amount of fluid in the pericolic gutters bilaterally. No bowel obstruction.   Trace amount of edema within the mesentery fluid suggesting acute cholecystitis.   2. There is an enlarging fluid collection with infiltration of the surrounding fat which contains a few air bubbles (less than previous) which is contiguous with the anterior superior aspect of the vaginal cuff measur left PCA territory are again identified.

## 2020-03-17 NOTE — PROGRESS NOTES
INFECTIOUS DISEASE PROGRESS NOTE    Abhay Payment Patient Status:  Inpatient    1945 MRN [id number]   [facility] 3NE-A Attending Dago Hogdes MD   [facility] Day # 16 PCP Opal Petit Oral, Q12H PRN  •  acetaminophen (TYLENOL) tab 650 mg, 650 mg, Oral, BID  •  morphINE sulfate (PF) 4 MG/ML injection 1 mg, 1 mg, Intravenous, Q4H PRN  •  Zolpidem Tartrate (AMBIEN) tab 5 mg, 5 mg, Oral, Nightly PRN  •  Prochlorperazine Maleate (COMPAZINE) Lungs clear bilaterally. No wheezes. Decreased at bases  Cardiovascular: S1, S2. Regular rate and rhythm. +TIA. Pacemaker pocket w/o signs of infection  Abdomen: Soft, nontender, nondistended. Positive bowel sounds.   Musculoskeletal: Full range of motion ABDOMEN+PELVIS 3/17/2020    FINDINGS:   LIVER: Unremarkable appearance without contrast.  BILIARY: There are faint gallstones. There is distension of the gallbladder with a transverse dimension of 5.4 cm.  There is some gallbladder wall thickening and a sma fracture. Degenerative disc disease L3-4 and L2-3, degenerative changes of the hip joints and SI joints.   LUNG BASES: There is patchy interstitial changes in the lung bases either representing atelectasis or infiltrate minimally more prominent than on the suspect contaminated Bcx-  only in 1 of 2 blood cultures. Blood cultures 3/11 still negative. Follow repeat blood cultures 3/16. 3. PAD- with stable changes to LLE, does not seem to be the source of infection  4.  Pelvic mass s/p bilateral salpingo-oophere

## 2020-03-17 NOTE — PROGRESS NOTES
[facility]  Progress Note    Jocykb Rangel Patient Status:  Inpatient    1945 MRN [id number]   [facility] 3NE-A Attending Socorro Cleveland MD   [address] Day # 16 PCP Evonne Fonseca MD       SUBJECTIVE:  Comfortable  No complaint except [facility]  Labetalol HCl (NORMODYNE) tab 50 mg, 50 mg, Oral, BID  ondansetron HCl (ZOFRAN) injection 4 mg, 4 mg, Intravenous, Q6H  insulin detemir (LEVEMIR) 100 UNIT/ML flextouch 5 Units, 5 Units, Subcutaneous, Nightly  Acetaminophen-Codeine #3 (TYLENOL #3) 300- 1.  Examination shows some old blood clots. Vaginal suture line is intact     Dispo: No surgical intervention is required  Bleeding related to anticoagulation    Questions/concerns were discussed with patient and/or family by bedside.     Rick ESCOBAR

## 2020-03-17 NOTE — PROGRESS NOTES
Patient well known to me  Patient had vaginal bleeding last night and today  lovenox stopped last night at request of Gyn  Will await evaluation by Dr. Wallace Fang

## 2020-03-17 NOTE — PROGRESS NOTES
Discused P2Y12 results with Doctor Jaimie Nearing. AM level 147 MD discussed with doctor Ryan garcia to start Cangrelor drip at . 75mcg/kg/min   Pharmacy was notified

## 2020-03-17 NOTE — CONSULTS
[address] [facility] Patient Status:  Inpatient    1945 MRN [id number]   [facility] 4SW-A Attending Kenan Esteban MD   [facility] Day # 12 PCP Claudine Cantu MD     Date of Admission: 3/1/2020  Admission Diagnosis: Vaginal bleed • HYSTERECTOMY     • OTHER      Pacemaker   • VAGINAL HYSTERECTOMY WITH BILATERAL [address] N/A 3/4/2020    Performed by Jamie Orellana MD at [address]   • XI ROBOT-ASSISTED LAPAROSCOPIC HYSTERECTOMY Bilateral 2/4/2020    Performed by Garrett Hammond Solution, Inject 80 mg into the skin 2 (two) times daily. , Disp: , Rfl:   HYDROcodone-acetaminophen 5-325 MG Oral Tab, Take 1 tablet by mouth every 4 (four) hours as needed for Pain., Disp: , Rfl:   Omeprazole 40 MG Oral Capsule Delayed Release, Take 40 mg Chloride 0.9 % 10 mL IV push, 40 mg, Intravenous, Q12H  •  Metoclopramide HCl (REGLAN) injection 5 mg, 5 mg, Intravenous, Q6H  •  cangrelor tetrasodium (KENGREAL) 50 mg in sodium chloride 0.9% 250 mL IVPB for BRIDGING, 0.75 mcg/kg/min, Intravenous, Continu (DEX4) oral liquid 15 g, 15 g, Oral, Q15 Min PRN **OR** Glucose-Vitamin C (DEX-4) chewable tab 4 tablet, 4 tablet, Oral, Q15 Min PRN **OR** dextrose 50 % injection 50 mL, 50 mL, Intravenous, Q15 Min PRN **OR** glucose (DEX4) oral liquid 30 g, 30 g, Oral, Q Exam:                          General: fatigued but alert and cooperative, in NAD                          HEENT: oropharynx clear without erythema or exudates, moist mucous membranes                          Lungs: Clear to auscultation bilaterally, no w seroma  -IR consulted for drainage given concern for abscess   -surgery following as well   -Abx per ID  5.  Pelvic mass  -s/p RADHA-BSO 2/4 complicated by vaginal dehiscence s/p return to the OR on 3/4 with evacuation of hematoma and repair of vaginal dehisc

## 2020-03-17 NOTE — PROGRESS NOTES
[facility]  Cardiology Progress Note    Subjective:  No chest pain or shortness of breath. Very pale. Per RN, passed a large amount of vaginal clots earlier. Feels \"anxious\".   Repeat CT abd/pelvis shows enlarging pelvic fluid collection concernin following. CT abd/pelvis from yesterday reviewed. Shows enlarging fluid collection with infiltration of surrounding fat which contains a \"few air bubbles\". On zosyn. · Recurrent anemia with recurrent vaginal bleeding, occurred initially at [address] [facility]. [place]. - Continue labetalol, hydralazine as hemodynamics allow. Continue statin.   - Consider IR consultation for direction on timing of potential pelvic fluid evacuation in setting of recent Plavix/anticoagulation use    Husam Foley, APRN  3/17/2020

## 2020-03-17 NOTE — PROGRESS NOTES
[facility] Gastroenterology Progress Note    S: Pt still with nausea at this time.      O: /75   Pulse 120   Temp 97.6 °F (36.4 °C)   Resp (!) 36   Ht 64.02\"   Wt 178 lb 9.6 oz (81 kg)   SpO2 95%   BMI 30.64 kg/m²     Gen: NAD    CV: RRR with no

## 2020-03-17 NOTE — PLAN OF CARE
Assumed care at 0730. Pt a/ox4, pale, on RA, ST with PVCs on telemetry. Daily wt. Accuchecks QID. Ab CT completed this AM. Pt with abdominal pain, declining pain meds at this time. Nausea with 1x emesis this AM, PRN and scheduled meds.  Refusing scheduled z minimize risk of hemorrhage  - Monitor temperature, glucose, and sodium.  Initiate appropriate interventions as ordered  Outcome: Progressing     Problem: Impaired Functional Mobility  Goal: Achieve highest/safest level of mobility/gait  Description  Nain Palacios

## 2020-03-17 NOTE — PHYSICAL THERAPY NOTE
Attempted to see pt. Pt transferred to ICU on 3/17/2020 due to drop in blood pressure, tachyardia, leukocytosis, tachycardia, and worsening  anemia due to bleeding (appears vaginal per notes).  Pt placed on hold for inpt PT, today, and will allow for ICU re

## 2020-03-17 NOTE — PROGRESS NOTES
[facility]    Progress Note    Rupinder Bella Patient Status:  Inpatient    1945 MRN [id number]   [facility] 3NE-A Attending Melia Monge,    Hosp Day # 12 PCP Sally Cuevas MD       Subjective:     Transferred to the ICU  Livia chronic kidney disease who presented to the emergency room on March 1 with anemia.     ROLANDO -baseline serum creatinine 1.0 -1.2 mg/dL  Acute rise in serum creatinine on 3/11; improved with IV fluid.   Continue  No hydronephrosis on to CT scan noted from 3/10

## 2020-03-17 NOTE — PROGRESS NOTES
[facility] hospitalist daily note     Patient was seen/examined on 3/17/20     S; pt still had lower abdominal pain today. + nausea/emesis (nonbloody).  No chest pain or SOB, no cough now  Small amount of  Diarrhea  I checked with nursing, pt had more blood clots thickening involving the bladder suggesting cystitis.       CXR  IMPRESSION:  Unremarkable portable chest radiograph.        Assessment/plan Ms. Frankel with [facility] ofwith OB/GYN; course c/b inferior STEMI, CVA and LLE ischemia secondary to left popliteal art refused HIDA scan during admit. Pt is on IV Abx  Seen by GI ,  Surgery, ID  IR consulted and tentatively planning fluid drainage tomorrow            # Hx Left LE Ischemia secondary to left popliteal artery occulusion  - has had some discoloration of left to above    Another hosptialist will see this patient on 3/18/20       Ann Marie Crawford MD  [facility] hospitalist  [phone]    Addendum spoke with Dr. Jose R Olivo (surgery) and advised to get HIDA scan done for the patient.  Lurdes Wolff MD       Addendum; spoke

## 2020-03-17 NOTE — PLAN OF CARE
Pt. A&Ox4  RA  Low grade fevers overnight, ice packs applied   BP controlled, pt refused labetolol d/t her BP being at 133/80, will recheck in AM  Tele- Sinus Tach (NSR at times)  Sepsis BPA fired, MD notified- No new orders  Clots/bleeding from vagina not neurological status  Description  INTERVENTIONS  - Assess for and report changes in neurological status  - Initiate measures to prevent increased intracranial pressure  - Maintain blood pressure and fluid volume within ordered parameters to optimize cerebr

## 2020-03-18 NOTE — PLAN OF CARE
Received AO x 4, irritable; refused to take most of the PO meds. On RA, no desat noted; tachypneic in upper20`s-30`s. ST in 120s-130s. BP stable with parameter goal of SBP above 120;  Post 2u PRBC BT; rpt hgb 9.5.    Pericare done with moderate vaginal ventilation and oxygenation  Description  INTERVENTIONS:  - Assess for changes in respiratory status  - Assess for changes in mentation and behavior  - Position to facilitate oxygenation and minimize respiratory effort  - Oxygen supplementation based on ox

## 2020-03-18 NOTE — PHYSICAL THERAPY NOTE
Pt being followed by PT. Attempted to see pt at 11:30am. Pt off floor for scan. Checked back on pt at 3:00pm. Pt refusing to work with PT. Pt educated on risks of not moving and staying in bed too long.   Will continue to follow

## 2020-03-18 NOTE — PROGRESS NOTES
Getting mid-line catheter placed. Events of day noted. Remains in sinus tachy with -130. Hgb 7.7 despite PRBC transfusion. Dr Annalisa Mcdermott IR notes reviewed. Systemic anticoagulation and anti-platelet therapy remains on hold.     Check follow up

## 2020-03-18 NOTE — PROGRESS NOTES
Pulmonary Progress Note        NAME: Valeria Leung - ROOM: 681/359-Q - MRN: [id number] - Age: 69year old - : 1945        Last 24hrs: No events overnight, resting after her eventful morning    OBJECTIVE:   20  0700 20  0800 20  09 BS  Extremities: edema trace in michelle LE    Labs reviewed as noted below        ASSESSMENT/PLAN:  1. Hypotension: sepsis vs acute blood loss anemia given increased vaginal bleeding  -improved  -antibiotics per ID   -follow up on cultures   2.  Anemia: 2/2 vag

## 2020-03-18 NOTE — PROGRESS NOTES
[facility]    Progress Note    Opal Burrows Patient Status:  Inpatient    1945 MRN [id number]   [facility] 3NE-A Attending Dahiana Rivas, DO   Hosp Day # 16 PCP Jennifer Huynh MD       Subjective:     Transferred to the ICU  Now kidney disease who presented to the emergency room on March 1 with anemia.     ROLANDO -baseline serum creatinine 1.0 -1.2 mg/dL  Acute rise in serum creatinine on 3/11; improved with IV fluid. Creatinine with uptrend today.   Likely due to relative hypotensio

## 2020-03-18 NOTE — PROGRESS NOTES
[facility]  Progress Note    Julian Garcia Patient Status:  Inpatient    1945 MRN [id number]   [facility] 4SW-A Attending Patti Felipe, [address] [facility] Day # 16 PCP Mikki Gil MD     Subjective:    Requested by hospitalist to se Adjustment disorder with depressed mood     Avoidant personality disorder (HCC)     Left hemiparesis (HCC)     Hyperlipidemia     Non compliance w medication regimen     Rotator cuff syndrome of right shoulder     Impingement syndrome, hip, right     Osteo

## 2020-03-18 NOTE — ANESTHESIA PREPROCEDURE EVALUATION
PRE-OP EVALUATION    Patient Name: Hasmukh Pat    Pre-op Diagnosis:    Procedure(s):  IR drain placement     Pre-op vitals reviewed. Temp: 99.2 °F (37.3 °C)  Pulse: 106  Resp: 33  BP: 118/70  SpO2: 93 %  Body mass index is 30.6 kg/m².     Current medica [COMPLETED] vancomycin IVPB premix 2g in 0.9% NaCl 500 mL, 25 mg/kg, Intravenous, Once  Prochlorperazine Edisylate (COMPAZINE) injection 10 mg, 10 mg, Intravenous, Q6H PRN  [COMPLETED] Clopidogrel Bisulfate (PLAVIX) tab 75 mg, 75 mg, Oral, Once  [COMPLETED Glucose-Vitamin C (DEX-4) chewable tab 4 tablet, 4 tablet, Oral, Q15 Min PRN    Or  dextrose 50 % injection 50 mL, 50 mL, Intravenous, Q15 Min PRN    Or  glucose (DEX4) oral liquid 30 g, 30 g, Oral, Q15 Min PRN    Or  Glucose-Vitamin C (DEX-4) chewable tab Omeprazole 40 MG Oral Capsule Delayed Release, Take 40 mg by mouth daily. , Disp: , Rfl:   Ondansetron HCl (ZOFRAN) 4 mg tablet, Take 4 mg by mouth daily. , Disp: , Rfl:   ondansetron 4 MG Oral Tablet Dispersible, Take 4 mg by mouth every 6 (six) hours as ne Exercise tolerance: poor     MET: <=4      (+) hypertension   (+) hyperlipidemia  (+) CAD  (+) past MI    (+) pacemaker/AICD                          Endo/Other      (+) diabetes  type 2, using insulin      (+) anemia                   Pulmonary    Negativ Component Value Date     03/18/2020     01/08/2020    K 3.4 (L) 03/18/2020    K 4.00 01/08/2020     03/18/2020     01/08/2020    CO2 18.0 (L) 03/18/2020    CO2 27.7 01/08/2020    BUN 23 (H) 03/18/2020    BUN 21.0 (H) 01/08/2020    C

## 2020-03-18 NOTE — ANESTHESIA POSTPROCEDURE EVALUATION
[facility] Patient Status:  Inpatient   Age/Gender 69year old female MRN [id number]   [facility] 4SW-A Attending Dia Womack, [address] [facility] Day # 16 PCP Jennifer Huynh MD       Anesthesia Post-op Note    * No procedures

## 2020-03-18 NOTE — PROGRESS NOTES
Addendum to Pito Voss's progress note  Discussed with Dr. Trevor Pathak from IR and decided not to proceed with lap cholecystectomy today and proceed with cholecystostomy tube placement based on her current clinical condition.   Patient had recent periop MI and C

## 2020-03-18 NOTE — PROGRESS NOTES
INFECTIOUS DISEASE PROGRESS NOTE    Julian Garcia Patient Status:  Inpatient    1945 MRN [id number]   [facility] 3NE-A Attending Dl Villalobos MD   [facility] Day # 16 PCP Alva Calero 650 mg, 650 mg, Oral, Q12H PRN  •  acetaminophen (TYLENOL) tab 650 mg, 650 mg, Oral, BID  •  morphINE sulfate (PF) 4 MG/ML injection 1 mg, 1 mg, Intravenous, Q4H PRN  •  Zolpidem Tartrate (AMBIEN) tab 5 mg, 5 mg, Oral, Nightly PRN  •  Prochlorperazine Male Decreased at bases  Cardiovascular: S1, S2. Regular rate and rhythm. +TIA. Pacemaker pocket w/o signs of infection  Abdomen: Soft, mildly tender to palpation, nondistended.   Positive bowel sounds. + RLQ drain in place with bloody drainage; + cholecystotomy species, not aureus    Radiology:   Reviewed. PROCEDURE: CT ABDOMEN+PELVIS 3/17/2020    FINDINGS:   LIVER: Unremarkable appearance without contrast.  BILIARY: There are faint gallstones.  There is distension of the gallbladder with a transverse dimension prominent than on the previous study. BONES: No bony lesion or fracture. Degenerative disc disease L3-4 and L2-3, degenerative changes of the hip joints and SI joints.   LUNG BASES: There is patchy interstitial changes in the lung bases either representing acute cholecystitis, s/p placement of cholecystotomy tube by IR 3/18  3. Staph sp bacteremia suspect contaminated Bcx-  only in 1 of 2 blood cultures. Blood cultures 3/11 still negative. Follow repeat blood cultures 3/16- negative so far. 4.  PAD- with sta

## 2020-03-18 NOTE — PROGRESS NOTES
[facility] Hospitalist Team  Progress Note      Minus Travis  5/25/1945    Assessment/Plan:     [facility]; course c/b inferior STEMI, CVA and LLE ischemia secondary to left popliteal artery occlusion; DM type II, CKD who presented to ED sensitive to drops in BP, does better with SBPs closer to 130-- Family very concerned about BP drop because her clinical condition in the setting of her vascular disease seems to worsen <130. They would like to ensure that nursing contacts MD for BP<130. 03/17/20  0624 03/18/20  0454    137  137 136 136 138   K 3.7 3.9  3.9 3.4* 3.8 3.4*    109  109 107 107 110   CO2 23.0 21.0  21.0 22.0 15.0* 18.0*   BUN 14 10  10 9 15 23*   CREATSERUM 1.48* 1.36*  1.36* 1.24* 1.39* 1.93*   CA 8.0* 8.3*  8.3* ([address]    Hyperglycemia    Vaginal bleeding

## 2020-03-18 NOTE — PROCEDURES
1.  8F monica tube placed. Dk green/black bile, sample to lab. Tube must remain in a minimum of 6 weeks unless taken out surgically w/ cholecystectomy. 2.  8F pigtail into complex fluid collection in pelvis.   Only a few ml of blood clots could be aspirat

## 2020-03-18 NOTE — PLAN OF CARE
Received pt drowsy, oriented to self, place, situation, Reports March 19, 2020 as date; reoriented. Continues on 2L post procedure. Afebrile. NSR to ST on monitor. Ok to resume diet post procedure. No BM. Incontinent of urine.  Family updated on plan of c

## 2020-03-18 NOTE — CM/SW NOTE
Care Progression Note:  Active Acute Medical Issue:  Symptomatic anemia   Hypotension  Possible acute cholecystitis    Other Contributing Medical Factors/Dx. :  CAD  LLE ischemia  S/P RADHA/BSO 2/4, dehiscence return to OR 3/4 for hematoma evac and vag dehisc

## 2020-03-18 NOTE — IMAGING NOTE
The patient is under the care of Anesthesia during this CT visit. The Patient arrived to CT with HER nurse from room 467 ICU. She needs a pelvic fluid collection drain and a Gallbladder drain. AN IV is located int the Right arm.  The Patient's affect is s

## 2020-03-18 NOTE — CONSULTS
I was trying to see pt this am about 10 am, she was on the procedure. I went to [facility] for round and called back around 1:20 pm, she was still on procedure.      This consult is not because pt has new neurological symptoms but due to hx of stroke on Feb. She

## 2020-03-19 NOTE — PROGRESS NOTES
[facility] Hospitalist Team  Progress Note      Opal Burrows  5/25/1945    Assessment/Plan:     Ms. Marcia Mart [facility]; course c/b inferior STEMI, CVA and LLE ischemia secondary to left popliteal artery occlusion; DM type II, CKD who presented to ED sensitive to drops in BP, does better with SBPs closer to 130-- Family very concerned about BP drop because her clinical condition in the setting of her vascular disease seems to worsen <130. They would like to ensure that nursing contacts MD for BP<130. 03/17/20  0624 03/18/20  0454 03/19/20  0503     137 136 136 138 143   K 3.9  3.9 3.4* 3.8 3.4* 3.5     109 107 107 110 116*   CO2 21.0  21.0 22.0 15.0* 18.0* 17.0*   BUN 10  10 9 15 23* 26*   CREATSERUM 1.36*  1.36* 1.24* 1.39* 1.93* 1.77*   C **OR** Glucose-Vitamin C       Principal Problem:    Symptomatic anemia  Active Problems:    Hyponatremia    Leukocytosis    Acute kidney injury ([facility] [address]    Hyperglycemia    Vaginal bleeding    Depressive disorder

## 2020-03-19 NOTE — PROGRESS NOTES
[facility] Pharmacy Note:  Renal Adjustment for meropenem (Avery Hoskins)    Rupinder Bella is a 69year old female who has been prescribed meropenem (MERREM) 500 mg every 8 hrs. The estimated creatinine clearance is 24.8 mL/min (A) (based on SCr of 1.77 mg/dL (H)).  so

## 2020-03-19 NOTE — OCCUPATIONAL THERAPY NOTE
Attempted to see pt for skilled OT services this date. Pt is currently refusing secondary to fatigue. Will re-attempt as schedule allows.  Marie Mendenhall, 03/18/20, 10:19 AM

## 2020-03-19 NOTE — CONSULTS
Neurology consult NOTE    The reason for consultation:  Hx of stroke. HPI:   70 y/o woman was admitted on 3/1 due to vaginal bleeding.  She had left hemisphere stroke back to 2/4 when she was in hospital for prolonged stay due to other medication conditio Occupational History      Not on file    Social Needs      Financial resource strain: Not on file      Food insecurity:        Worry: Not on file        Inability: Not on file      Transportation needs:        Medical: Not on file        Non-medical: Not o every 6 (six) hours as needed (for SBP >170). , Disp: , Rfl:   Saline Nasal Spray 0.65 % Nasal Solution, 2 sprays by Nasal route as needed for congestion. , Disp: , Rfl:   Zolpidem Tartrate 5 MG Oral Tab, Take 5 mg by mouth nightly as needed for Sleep., Disp 0  traMADol HCl 50 MG Oral Tab, Take 1 tablet (50 mg total) by mouth 3 (three) times daily. , Disp: 30 tablet, Rfl: 0  gabapentin 400 MG Oral Cap, Take 1 capsule (400 mg total) by mouth nightly., Disp: 30 capsule, Rfl: 0  magnesium oxide 400 MG Oral Tab, Ta Hearing: NL     Palate: NL     SCM/Trapezius: 5/5     Tongue in the middle    MOTOR FUNCTION:     Tone: increase on left arm. Bulk: NL     Strength: 0-1/5 on left UE and 1-2/5 on left LE.     SENSORY FUNCTION:     Intact to lt on both sides.    Woodrow Goodman you have further questions, please feel free to contact me.     Sincerely,      Juliana Thomson MD

## 2020-03-19 NOTE — PROGRESS NOTES
[facility]    Progress Note    Maura Boo Patient Status:  Inpatient    1945 MRN [id number]   [facility] 3NE-A [facility]   Hosp Day # 25 PCP Sarah Lemus MD       Subjective:     Remains in  ICU  Status post presented to the emergency room on March 1 with anemia.     ROLANDO -baseline serum creatinine 1.0 -1.2 mg/dL  Acute rise in serum creatinine on 3/11; improved with IV fluid. Repeat ROLANDO due to relative hypotension.     Continue with supportive care, IV fluid

## 2020-03-19 NOTE — PROGRESS NOTES
[facility]  Report of Psychiatric Progress Note    Margarito Pang Patient Status:  Inpatient    1945 MRN [id number]   [facility] 4SW-A Attending Ana Lilia Harmon MD   [facility] Day # 25 PCP Kulwant Solis MD     Date of Admission: 3/1/20 s/p aspiration, DM2, CKD, HTN, pelvic mass s/p bilateral salpingo-oopherectomy on 2/4/20, inferior STEMI, CVA, ROLANDO, and LLE ischemia    Recommendations:  1) Continue to communicate the risks/benefits and consequences of her health care decisions.  Just know recommended, but she refused. She was transferred to the ICU today because of hypotension and a concern for sepsis vs acute blood loss. Psych consulted to carmen for decision making capacity.  She is known to the psych service when she was refusing antihy Not talkative today. Asked me to adjust the height of bed and to give her some water. She misses having a caregiver at her side. Psychiatry Review Systems: No voices or visions.  No elevated mood, racing thoughts, decreased need for sleep, grandiose tho Consider pre-treatment if             needed  Lipitor [Atorvastat*    INSOMNIA    Medications:    Current Facility-Administered Medications:   •  potassium chloride 40 mEq in sodium chloride 0.9% 250 mL IVPB, 40 mEq, Intravenous, Once  •  Normal Saline Flu (BENADRYL) injection 12.5 mg, 12.5 mg, Intravenous, Q4H PRN  •  magnesium oxide (MAG-OX) tab 400 mg, 400 mg, Oral, TID CC  •  cetirizine (ZYRTEC) tab 10 mg, 10 mg, Oral, BID with meals  •  diphenhydrAMINE (BENADRYL) cap/tab 25 mg, 25 mg, Oral, Q6H PRN  • 03/19/2020    HGB 8.7 03/19/2020    HCT 26.4 03/19/2020    .0 03/19/2020    CREATSERUM 1.77 03/19/2020    BUN 26 03/19/2020     03/19/2020    K 3.5 03/19/2020     03/19/2020    CO2 17.0 03/19/2020     03/19/2020    CA 8.1 03/19/20 bilaterally. No bowel obstruction. Trace amount of edema within the mesentery. Small hiatal hernia again noted. ABDOMINAL WALL:  No mass or hernia.     URINARY BLADDER:  The bladder is incompletely distended with bladder wall thickening especially anter is contiguous with the anterior superior aspect of the vaginal cuff measuring 5.9 x 3.3 x 3.1 cm (previously   4.8 x 2.6 x 3.2 cm) the differential would include abscess, hematoma, seroma. Abscess is favored.   Increase in the fluid in the presacral space

## 2020-03-19 NOTE — PROGRESS NOTES
Critical Care Progress Note     Assessment / Plan:  1. Hypotension: sepsis vs acute blood loss anemia given increased vaginal bleeding  -resolved  -antibiotics per ID   -follow up on cultures   2.  Anemia: 2/2 vaginal bleeding  -transfuse to maintain hgb >

## 2020-03-19 NOTE — OCCUPATIONAL THERAPY NOTE
Attempted to see pt for skilled OT services this date x2. First attempt, pt eating breakfast, second attempt, pt occupied by ultrasound. Will re-attempt as schedule allows.  Marty Plascencia, 03/19/20, 10:20 AM

## 2020-03-19 NOTE — PLAN OF CARE
Received report and assumed care of pt at 0730. Pt A+Ox4, on 2L NC, VSS. 1uPRBC administered per Dr Paige Cabrera to maintain Hgb>10. However pt became acutely short of breath, -160s, tachypnic RR 30-40s around 1455; Morena Hood APN at bedside.  Blood transfus

## 2020-03-19 NOTE — PLAN OF CARE
Pt received on 2 L nc. Denies pain/shortness of breath. She is pale/lethargic but oriented. sbp 120s at start of shift alexia Canas updated with repeat hgb of 6.7- orders obtained to transfuse 1 U PRBC- repeat hgb 8.7. sbp >130s after transfusion.    This am pt

## 2020-03-19 NOTE — IMAGING NOTE
Cholecystostomy drain output 220 cc and RLQ drain output 22.5 cc in 24 hours. Cultures pending. For cholecystostomy, drain must be kept in for a minimum of 6 weeks prior to removal, unless there is interval cholecystectomy.      For RLQ, continue drainag

## 2020-03-19 NOTE — PHYSICAL THERAPY NOTE
PT following pt. Attempted to see patient twice this morning. Pt is now getting ultrasound done. Will continue to follow.

## 2020-03-19 NOTE — PROGRESS NOTES
[facility]  Progress Note    Otilio Lyons Patient Status:  Inpatient    1945 MRN [id number]   [facility] 4SW-A Attending Reynaldo Luther, [address] [facility] Day # 25 PCP Anthony Wei MD       SUBJECTIVE:  Overall comfortable but scared Q12H  Metoclopramide HCl (REGLAN) injection 5 mg, 5 mg, Intravenous, Q6H  cangrelor tetrasodium (KENGREAL) 50 mg in sodium chloride 0.9% 250 mL IVPB for BRIDGING, 0.75 mcg/kg/min, Intravenous, Continuous  heparin (PORCINE) drip 12557dtwke/250mL infusion, 5 Or  Glucose-Vitamin C (DEX-4) chewable tab 8 tablet, 8 tablet, Oral, Q15 Min PRN  Insulin Aspart Pen (NOVOLOG) 100 UNIT/ML flexpen 1-5 Units, 1-5 Units, Subcutaneous, TID CC and HS          Assessment/Plan:   1.   Vaginal bleeding  Pelvic lamination reveals

## 2020-03-19 NOTE — PROGRESS NOTES
[facility]  Progress Note    Latasha Schirmer Patient Status:  Inpatient    1945 MRN [id number]   [facility] 4SW-A Attending Donato Anne, DO   Hosp Day # 25 PCP Mayela Amos MD     Subjective:  Events of last night reviewed ([facility] [address]    Hyperglycemia    Vaginal bleeding    CAD, severe native, 6 weeks S/P salvage KISHA to RCA for acute post op MI. Hx old and recent acute CVA claudia-op initial surgery.     Continue blood loss with ongoing transfusion needs and vaginal clots suggests

## 2020-03-19 NOTE — PROGRESS NOTES
[facility] Pharmacy Note:  Renal Dose Adjustment for Cetirizine (ZYRTEC)    Olegario Cogan has been prescribed cetirizine (Zyrtec) 10 mg po BID. Estimated Creatinine Clearance: 24.8 mL/min (A) (based on SCr of 1.77 mg/dL (H)).     Her calculated creatinine chichi

## 2020-03-19 NOTE — PROGRESS NOTES
Above discussed with Dr. Tyra Krabbe and waiting to hear back from the vascular surgeon. Dr. Tyra Krabbe will start her on anticoagulation with Plavix IV tomorrow.   I will also contact the family

## 2020-03-19 NOTE — PROGRESS NOTES
[facility]  Progress Note    Juan M Stewart Patient Status:  Inpatient    1945 MRN [id number]   [facility] 4SW-A Attending Nikki William, [address] [facility] Day # 25 PCP Christal De Leon MD     Subjective:    Patient reports mild abdominal CAD in native artery     Pure hypercholesterolemia     Palliative care encounter     Goals of care, counseling/discussion     ROLANDO (acute kidney injury) ([facility] [address]     Hypernatremia     Symptomatic anemia     Hyponatremia     Leukocytosis     Acute kidney injury

## 2020-03-19 NOTE — PROGRESS NOTES
INFECTIOUS DISEASE PROGRESS NOTE    Laila Deliadamienmarvel Patient Status:  Inpatient    1945 MRN [id number]   [facility] 3NE-A Attending Leopoldo Donaldson MD   [address] [facility] Day # 18 PCP Kamari Mchugh Acetaminophen-Codeine #3 (TYLENOL #3) 300-30 MG tab 1 tablet, 1 tablet, Oral, Q4H PRN  •  Prochlorperazine Edisylate (COMPAZINE) injection 10 mg, 10 mg, Intravenous, Q6H PRN  •  acetaminophen (TYLENOL) tab 650 mg, 650 mg, Oral, Q12H PRN  •  acetaminophen ( SpO2 95 %, not currently breastfeeding. HEENT: no scleral icterus or conjunctival injection. Moist mucous membranes. No thrush  Neck: No lymphadenopathy. Supple. Respiratory: Lungs clear bilaterally. No wheezes. Decreased at bases.  +Accessory muscle use hours.    Microbiology  Reviewed in EMR,   Hospital Encounter on 03/01/20   1.  BODY FLUID CULT,AEROBIC AND ANAEROBIC     Status: None (Preliminary result)    Collection Time: 03/18/20  1:29 PM   Result Value Ref Range    Body Fld Smear 1+ Neutrophils seen 3/19/2020    CONCLUSION:   1. CT-guided placement of a cholecystostomy tube. The character of the bile was dark green-black. A sample was sent for laboratory testing.  This tube must remain in place a minimum of 6 weeks, unless removed earlier along with ch HERON    ID ATTENDING ADDENDUM     Pt seen an examined independently earlier today. Chart reviewed. Agree with above. Note has been reviewed by me and modified as needed. Exam and Impression/ Recs as noted above.   Pt was sleeping at the time of my exam, bu

## 2020-03-19 NOTE — PROGRESS NOTES
Notified by RN of patient becoming acutely SOB and tachycardic. At bedside to assess patient. She admitted to feeling SOB and evidence of IWOB. O2 sat 94% on 10L NC (from 2L NC this AM), RR 30s and -160s.  RT notified for STAT ABG and BIPAP given IWOB

## 2020-03-20 PROBLEM — Z95.5 PRESENCE OF DRUG COATED STENT IN RIGHT CORONARY ARTERY: Chronic | Status: ACTIVE | Noted: 2020-01-01

## 2020-03-20 PROBLEM — I25.2 MI, OLD: Chronic | Status: ACTIVE | Noted: 2020-01-01

## 2020-03-20 NOTE — PLAN OF CARE
Assumed patient care, VSS. On bipap overnight. Levo gtt for SBP goal 130-170. Precedex gtt. Patient refusing to be turned and some medications. States \"uncomfortable with mask on\". No blood clots from vagina noted. Denies pain.  Plan discussed with patien

## 2020-03-20 NOTE — PROGRESS NOTES
[facility]  Progress Note    Laila Cabral Patient Status:  Inpatient    1945 MRN [id number]   [facility] 4SW-A Attending Maricruz Sandoval, [address] [facility] Day # 23 PCP Briana Gray MD     Subjective:    Patient denies any abdominal p hypercholesterolemia     Palliative care encounter     Goals of care, counseling/discussion     ROLANDO (acute kidney injury) ([facility] [address]     Hypernatremia     Symptomatic anemia     Hyponatremia     Leukocytosis     Acute kidney injury ([facility] [address]     Hyperglycemia     V

## 2020-03-20 NOTE — PHYSICAL THERAPY NOTE
Pt being followed by physical therapy. Pt requiring bipap at this time and not medically stable for therapy. Will continue to follow.

## 2020-03-20 NOTE — PROGRESS NOTES
INFECTIOUS DISEASE PROGRESS NOTE    Nolen Lennox Patient Status:  Inpatient    1945 MRN [id number]   [facility] 3NE-A Attending Arsalan Ramon MD   [facility] Day # 23 PCP Jomar Villafuerte MG tab 1 tablet, 1 tablet, Oral, Q4H PRN  •  Prochlorperazine Edisylate (COMPAZINE) injection 10 mg, 10 mg, Intravenous, Q6H PRN  •  acetaminophen (TYLENOL) tab 650 mg, 650 mg, Oral, Q12H PRN  •  morphINE sulfate (PF) 4 MG/ML injection 1 mg, 1 mg, Intraven Supple. Respiratory: Lungs clear bilaterally. No wheezes. Decreased at bases. Cardiovascular: S1, S2. Tachycardic. Regular rhythm. +TIA. Pacemaker pocket w/o signs of infection  Abdomen: Soft, non-tender to light palpation, nondistended.   Positive bowel last 168 hours. Microbiology  Reviewed in EMR,   Hospital Encounter on 03/01/20   1.  BODY FLUID CULT,AEROBIC AND ANAEROBIC     Status: None (Preliminary result)    Collection Time: 03/18/20  1:29 PM   Result Value Ref Range    Body Fluid Culture Result changes to LLE, does not seem to be the source of infection  7. Pelvic mass s/p bilateral salpingo-oopherectomy on 2/4, complicated with CVA, STEMI and LLE ischemia. Had been on Plavix.   8. Anemia secondary to acute vaginal blood loss, s/p PRBCs  9. Leukoc

## 2020-03-20 NOTE — CM/SW NOTE
Care Progression Note:  Active Acute Medical Issue:  Symptomatic anemia   Hypotension  Possible acute cholecystitis     Other Contributing Medical Factors/Dx. :  CAD  LLE ischemia  S/P RADHA/BSO 2/4, dehiscence return to OR 3/4 for hematoma evac and vag dehis

## 2020-03-20 NOTE — PROGRESS NOTES
Pharmacy Dosing Service: Vancomycin    Kathleen Hay is a 69year old female for whom pharmacy has been consulted by Christian Vernon PA-C, Infectious Disease, to dose vancomycin for Gram positive rods detected in the 3/16 blood culture.  Based on th Range    Blood Culture Smear Gram Positive Rods (A) N/A   5. URINE CULTURE, ROUTINE     Status: None    Collection Time: 03/10/20  2:17 PM   Result Value Ref Range    Urine Culture No Growth at 18-24 hrs.  N/A       Dosing will be based on adjusted weight:

## 2020-03-20 NOTE — PROGRESS NOTES
Patient resting comfortably but requires vasopressors  Foot has more expected changes with vasopressor requirements but is still viable. Vasopressors started after transfusion but are still needed and her white count is 19.   Vascular Surgery and Cardiolo

## 2020-03-20 NOTE — CM/SW NOTE
MSW spoke with Aurora from [facility] .  Dr. Stacey Caballero has not yet made a final recommendation for this patient for Acute rehab. If the patient's participation improves, she will still consider.   Saeid Talbert will have PMR follow up Monday for re-eval.  MEKHI will continue to f

## 2020-03-20 NOTE — PROGRESS NOTES
Received pt on continuous AVAPS settings below. FIO2 weaned to 30%. Will continue to monitor and wean as tolerated.       03/20/20 0508   BiPAP   $ RT Standby Charge (per 15 min) 1  (bipap check)   Device V60   BiPAP / CPAP CE# 2143   Mode AVAPS   Interface

## 2020-03-20 NOTE — PROGRESS NOTES
[facility]  Progress Note    Julian Garcia Patient Status:  Inpatient    1945 MRN [id number]   [facility] 4SW-A Attending Patti Felipe, [address] [facility] Day # 23 PCP Mikki Gil MD       SUBJECTIVE:  Patient is sedated not saying 200 mg, 200 mg, Intravenous, Q24H  Pantoprazole Sodium (PROTONIX) 40 mg in Sodium Chloride 0.9 % 10 mL IV push, 40 mg, Intravenous, Q12H  Metoclopramide HCl (REGLAN) injection 5 mg, 5 mg, Intravenous, Q6H  cangrelor tetrasodium (KENGREAL) 50 mg in sodium c Oral, Q15 Min PRN  Insulin Aspart Pen (NOVOLOG) 100 UNIT/ML flexpen 1-5 Units, 1-5 Units, Subcutaneous, TID CC and HS          Assessment/Plan:   1.   No evidence of vaginal bleeding     Dispo: Discussed with intensivist and cardiologist Dr. Jim Mejía  Patient

## 2020-03-20 NOTE — PROGRESS NOTES
[facility]  Report of Psychiatric Progress Note    Willy Cardenas Patient Status:  Inpatient    1945 MRN [id number]   [facility] 4SW-A Attending Barbara Vides MD   [facility] Day # 23 PCP Phuc Jurado MD     Date of Admission: 3/1/20 s/p perc monica tube, pelvic fluid collection s/p aspiration, DM2, CKD, HTN, pelvic mass s/p bilateral salpingo-oopherectomy on 2/4/20, inferior STEMI, CVA, ROLANDO, and LLE ischemia    Recommendations:  1) She declines low dose benzos prn for anxiety and decli which contains a few air bubbles (less than previous) which is contiguous with the anterior superior aspect of the vaginal cuff measuring 5.9 x 3.3 x 3.1 cm (previously 4.8 x 2.6 x 3.2 cm) the differential would include abscess, hematoma, seroma.   Abscess depressed). She endorses high anxiety about her health issues. She also misses her caregiver Ying Olivas who keeps her company and assists her so she doesn't have to call the RN's or techs all the time. She has low energy and motivation and low appetite.  Shayan on the right side   • Stroke [facility])      Past Surgical History:   Procedure Laterality Date   • CARDIAC PACEMAKER PLACEMENT     • CATARACT      left eye   • HYSTERECTOMY     • OTHER      Pacemaker   • VAGINAL HYSTERECTOMY WITH BILATERAL SALPINGOOPH N/A 3/4/ [facility]  •  Labetalol HCl (NORMODYNE) tab 50 mg, 50 mg, Oral, BID  •  ondansetron HCl (ZOFRAN) injection 4 mg, 4 mg, Intravenous, Q6H  •  insulin detemir (LEVEMIR) 100 UNIT/ML flextouch 5 Units, 5 Units, Subcutaneous, Nightly  •  Acetaminophen-Codeine #3 (TYLE 03/20/20  0901   BP:    Pulse:    Resp: 26   Temp:      Appearance: fair grooming  Behavior: normal psychomotor  Attitude: cooperative and guarded  Gait: not observed    Speech: normal rate, rhythm and volume    Mood: anxious, tired  Affect: Congruent    T gallbladder wall thickening and a small amount of stranding in the gallbladder fossa suggesting acute cholecystitis. There is some mass effect upon the adjacent duodenum.   PANCREAS:  Moderate atrophy without focal mass without contrast.  SPLEEN:  Unrema representing atelectasis or infiltrate minimally more prominent than on the previous study. Pacemaker leads are noted. There is cardiomegaly. There is coronary artery   calcification. There is calcification of the mitral valve annulus.   Trace pericardi identified. Scattered acute infarcts in the left frontal, left parietal along with left occipital lobe is noted. Bony exostosis along the left parietal/temporal bone is suggested.        Smaller FLAIR abnormalities scattered in the white matter

## 2020-03-20 NOTE — PROGRESS NOTES
[facility]    Progress Note    Kathleen Hay Patient Status:  Inpatient    1945 MRN [id number]   [facility] 3NE-A Attending Dacia Muniz, DO   Hosp Day # 23 PCP Savannah Lyles MD       Subjective:     Remains in  ICU  BP better March 1 with anemia.     ROLANDO -baseline serum creatinine 1.0 -1.2 mg/dL  Acute rise in serum creatinine on 3/11; improved with IV fluid. Repeat ROLANDO due to relative hypotension. Continue with supportive care, IV fluid, vasopressors as needed.     Rileyr

## 2020-03-20 NOTE — PLAN OF CARE
Assumed pt care this morning. Drowsy, able to wean of precedex. Admits to feeling \"nervous\" selective with care at times. weaned off bipap per Dr. Jose Posada order.  Increased tachypnea and in heart rate into 120 this evening, patient refused to place

## 2020-03-20 NOTE — PROGRESS NOTES
[facility] Hospitalist Team  Progress Note      Jocy Rangel  5/25/1945    Assessment/Plan:     Ms. Wade [facility]choa [facility]; course c/b inferior STEMI, CVA and LLE ischemia secondary to left popliteal artery occlusion; DM type II, CKD who presented to ED stenosis  - as above, medical management  - per daughter is sensitive to drops in BP, does better with SBPs closer to 130-- Family very concerned about BP drop because her clinical condition in the setting of her vascular disease seems to worsen <130     138 143 144   K 3.9  3.9 3.4* 3.8 3.4* 3.5 3.9     109 107 107 110 116* 119*   CO2 21.0  21.0 22.0 15.0* 18.0* 17.0* 17.0*   BUN 10  10 9 15 23* 26* 29*   CREATSERUM 1.36*  1.36* 1.24* 1.39* 1.93* 1.77* 1.82*   CA 8.3*  8.3* 8.1* 8.4* 8.1* 8.1* 8.0* ([facility] [address]    Type 2 diabetes mellitus with complication, without long-term current use of insulin ([facility] [address]    Hyperlipidemia    CAD in native artery    Hyponatremia    Leukocytosis    Acute kidney injury ([address]    Hyperglycemia    Vaginal bleeding    Depressive di

## 2020-03-20 NOTE — PROGRESS NOTES
[facility]  Progress Note    Nancy Vazquez Patient Status:  Inpatient    1945 MRN [id number]   [facility] 4SW-A Attending Tiara Rivas, DO   Hosp Day # 23 PCP Alyssa De La Cruz MD     Subjective:  Events of over night reviewed Problem:    Symptomatic anemia  Active Problems:    Cerebrovascular accident (CVA) due to thrombosis of precerebral artery ([facility]    Type 2 diabetes mellitus with complication, without long-term current use of insulin ([facility]    Hyperlipidemia    CAD in nativ

## 2020-03-20 NOTE — PROGRESS NOTES
Received calls today for nurse and stated Pt may has right facial droop and some speech disturbance. Stat brain MRI was ordered, she has pace maker, not candidate. CT of brain w/o contrast was ordered, Pt refused.      I have reviewed Dr. Xavier Leaver note, Pt bronson

## 2020-03-20 NOTE — PROGRESS NOTES
Critical Care Progress Note        NAME: Julian Garcia - ROOM: [address] [place] [address]-B - MRN: [id number] - Age: 69year old - : 1945  Date of Admission: 3/1/2020  3:24 PM  Admission Diagnosis: Vaginal bleeding [N93.9]  Symptomatic anemia [D64.9]      Last 24hrs: (03/20/20 0825)   • cangrelor [facility]) infusion FOR BRIDGING Stopped (03/17/20 0900)   • Heparin infusion Stopped (03/17/20 1200)     PRN Medication:Acetaminophen-Codeine #3, Prochlorperazine Edisylate, acetaminophen, morphINE sulfate, Zolpidem Tartrate, plavix and asa  -cards signed off  8. H/o CVA w/ AMS  -family concerned for right sided facial droop  -discussed w/ neuro, recommend stat MRI brain- ordered  9.  Right lip swelling  -pt w/ h/o angioedema  -concern that pt has not received her meds in the pa

## 2020-03-21 NOTE — PROGRESS NOTES
Critical Care Progress Note        NAME: Hasmukh Pat - ROOM: 508/847-X - MRN: [id number] - Age: 69year old - : 1945  Date of Admission: 3/1/2020  3:24 PM  Admission Diagnosis: Vaginal bleeding [N93.9]  Symptomatic anemia [D64.9]      Last 24hrs: insulin detemir  5 Units Subcutaneous Nightly   • magnesium oxide  400 mg Oral TID CC   • gabapentin  400 mg Oral Nightly   • Rosuvastatin Calcium  20 mg Oral Daily   • Insulin Aspart Pen  1-5 Units Subcutaneous TID CC and HS     Continuous Infusing Medica collection: abscess vs hematoma vs seroma  -s/p drainage cath placed by IR  -surgery following as well   -Abx per ID  6.  Pelvic mass  -s/p RADHA-BSO 2/4 complicated by vaginal dehiscence s/p return to the OR on 3/4 with evacuation of hematoma and repair of v

## 2020-03-21 NOTE — PROGRESS NOTES
[facility]  Progress Note    Andrew Side Patient Status:  Inpatient    1945 MRN [id number]   [facility] 4SW-A Attending Sean Mendez, [address] [facility] Day # 21 PCP Harjit Stockton MD       SUBJECTIVE:  Feeling tired and no appetite sodium chloride 0.9% 100 mL MBP, 500 mg, Intravenous, Q12H  norepinephrine (LEVOPHED) 4 mg/250 ml premix infusion, 0.5-30 mcg/min, Intravenous, Continuous  Normal Saline Flush 0.9 % injection 10 mL, 10 mL, Intravenous, Q12H  Fluconazole in Sodium Chloride UNIT/ML flexpen 1-5 Units, 1-5 Units, Subcutaneous, TID CC and HS          Assessment/Plan:   1. She has a little more fresh blood today comparing to yesterday. But not enough to cause 2 g drop of hemoglobin.   Vaginal examination shows suture line to be

## 2020-03-21 NOTE — PLAN OF CARE
Very weak, does IS poorly at 500, tachypnea with exertion and repositioning,  left side weakness from old cva,  a little facial/lip swelling and slight right facial droop is noticeable on assessment ( records represent this yesterday and addrssed), pt able Problem: Impaired Functional Mobility  Goal: Achieve highest/safest level of mobility/gait  Description  Interventions:  - Assess patient's functional ability and stability  - Promote increasing activity/tolerance for mobility and gait  - Educate and eng Provide nonpharmacologic comfort measures as appropriate  - Advance diet as tolerated, if ordered  - Obtain nutritional consult as needed  - Evaluate fluid balance  Outcome: Not Progressing     Problem: METABOLIC/FLUID AND ELECTROLYTES - ADULT  Goal: Elect

## 2020-03-21 NOTE — PLAN OF CARE
NURSING NOTES [id number]:  3/20 2000: Pt received alert, ox4. BIPAP. ST. Kergreal drip. KODY midline. R upper abd drain with bile output and R lower abd ANGELO noted. Mcclellan. 2100: Pt wants BIPAP off but encouraged pt to keep BIPAP.  Precedex restarted at 0.2 mcg trend weights  Outcome: Progressing     Problem: RESPIRATORY - ADULT  Goal: Achieves optimal ventilation and oxygenation  Description  INTERVENTIONS:  - Assess for changes in respiratory status  - Assess for changes in mentation and behavior  - Position to

## 2020-03-21 NOTE — PROGRESS NOTES
[facility]  Progress Note      Reyes Lezama Patient Status:  Inpatient    1945 MRN [id number]   [facility] 4SW-A Attending Tom Roldan, [address] [facility] Day # 21 PCP Raven Kirby MD       69year-old female with acute cholecystit

## 2020-03-21 NOTE — PROGRESS NOTES
INFECTIOUS DISEASE PROGRESS NOTE    Nolen Lennox Patient Status:  Inpatient    1945 MRN [id number]   [facility] 3NE-A Attending Arsalan Ramon MD   Hosp Day # 20 PCP Jomar Villafuerte Oral, Q4H PRN  •  Prochlorperazine Edisylate (COMPAZINE) injection 10 mg, 10 mg, Intravenous, Q6H PRN  •  acetaminophen (TYLENOL) tab 650 mg, 650 mg, Oral, Q12H PRN  •  morphINE sulfate (PF) 4 MG/ML injection 1 mg, 1 mg, Intravenous, Q4H PRN  •  Zolpidem T to light palpation, nondistended. Positive bowel sounds. + RLQ drain in place with bloody drainage; + cholecystotomy tube in place with green/brown output. Musculoskeletal: Full range of motion of all extremities.   No arthritis or deformity  Integument: 168 hours. Microbiology  Reviewed in EMR,   Hospital Encounter on 03/01/20   1. BLOOD CULTURE     Status: None (Preliminary result)    Collection Time: 03/20/20  1:48 PM   Result Value Ref Range    Blood Culture Result No Growth 1 Day N/A   2.  BODY FLUI resp failure- assume due to above + a component of fluid OL  7. PAD- with stable changes to LLE, does not seem to be the source of infection  8. Pelvic mass s/p bilateral salpingo-oopherectomy on 2/4, complicated with CVA, STEMI and LLE ischemia.  Had been

## 2020-03-21 NOTE — PROGRESS NOTES
Received a call from bedside RN.   Daughter Toan Cottrell called and went over the medication list.  The patient and daughter wanted changes to the medication list to be more in alignment with what they feel she should be taking and based on home medicat

## 2020-03-21 NOTE — PROGRESS NOTES
[facility]    Progress Note    Sydneyiggy Loredo Patient Status:  Inpatient    1945 MRN [id number]   [facility] 3NE-A Attending Tali Cabello, DO   Hosp Day # 20 PCP Rigoberto Borja MD       Subjective:     Remains in  ICU  BP better room on March 1 with anemia.     ROLANDO -baseline serum creatinine 1.0 -1.2 mg/dL  Acute rise in serum creatinine on 3/11; improved with IV fluid. Repeat ROLANDO due to relative hypotension. Continue with supportive care, IV fluid, vasopressors as needed.

## 2020-03-21 NOTE — PROGRESS NOTES
Patient received on AVAPs as documented. No distress at this time. Saturation 97%. Patient asleep. Will continue to monitor closely.        03/21/20 0119   BiPAP   $ RT Standby Charge (per 15 min) 1   $ Vent Care / Non-Invasive Subsequent Day Yes   Device V

## 2020-03-21 NOTE — PROGRESS NOTES
[facility] Hospitalist Progress Note     Manisha Melida Patient Status:  Inpatient    1945 MRN [id number]   [facility] 4SW-A Attending Evelin Escamilla, [address] [facility] Day # 21 PCP Wm Truong MD     CC: follow up    SUBJECTIVE:  HgB downtrend 1.82* 1.71*  1.71*   CA 8.3*  8.3*   < > 8.4* 8.1* 8.1* 8.0* 8.1*   MG 1.9  --   --  1.9 1.9 1.8 1.9   PHOS 2.4*  --   --  2.0* 2.2* 1.9* 1.7*   GLU 99  99   < > 205* 187* 145* 208* 163*    < > = values in this interval not displayed.        Recent Labs   L Hgb.     #  Anemia, 2/2 vaginal bleeding  - pt with hx of  pelvic mass s/p bilateral salpingo-oopherectomy   - suspect anemia due to vaginal bleed, went to OR 3/4, area of vaginal dehiscence was repaired, blood clots evacuated.   - seen by gynecology (see D ICA stenosis, medical management     # Severe Left ICA stenosis  - as above, medical management  - per daughter is sensitive to drops in BP, does better with SBPs closer to 130  - Family very concerned about BP drop because her clinical condition in the se

## 2020-03-21 NOTE — PLAN OF CARE
Received patient at 0200. VSS. BiPap 30% Fio2. Follows commands. No evidence of anxiety. Sleeping, and denies pain or discomfot. Turning q2. Will continue to monitor.

## 2020-03-21 NOTE — PLAN OF CARE
Around 1915-run of svt-non sustained, asymptomatic. But more tachypneic. Heart rate irregular now. Patient agreed to go back on bipap. udpated [facility] APN, EKG ordered.

## 2020-03-22 NOTE — PROGRESS NOTES
[facility] hospitalist daily note  Pt seen/examined on 3/22/20    S:  No chest pain, breathing ok, no abd pain, no emesis at this time      Medications in Epic    PE 98.3 125 140/89  Gen: awake, alert, no acute distress during my visit  HEENT; anicteric  CV: nl S fluconazole ordered     # Hx Left LE Ischemia secondary to left popliteal artery occulusion  - has had some discoloration of left toes  - lovenox and plavix ordered during admit but due to vaginal bleeding and drop in Hg held at this time  - per vascular n

## 2020-03-22 NOTE — PROGRESS NOTES
Neurology follow up NOTE    SUBJECTIVE:  She felt right facial droop not worsening, in fact better at times. No new neuro complaints.      OBJECTIVE:   /74   Pulse 118   Temp 98.9 °F (37.2 °C)   Resp 25   Ht 5' 4.02\" (1.626 m)   Wt 184 lb 11.9 oz (83 Component Value Date     K 3.3 (L) 03/21/2020     K 3.9 03/20/2020     K 3.5 03/19/2020            Lab Results   Component Value Date     BUN 32 (H) 03/21/2020     BUN 29 (H) 03/20/2020     BUN 26 (H) 03/19/2020            Lab Results   Component Value D

## 2020-03-22 NOTE — PROGRESS NOTES
PMR asked to assess rehab placement needs. Patient is doing very little therapy and not ready for rehab placement. Please consult when getting closer to discharge from medical perspective.     Maikel Smith MD

## 2020-03-22 NOTE — PROGRESS NOTES
[facility]  Progress Note    Olegario Cogan Patient Status:  Inpatient    1945 MRN [id number]   [facility] 4SW-A Attending Javier Marrufo, DO   Hosp Day # 21 PCP Claudine Cantu MD       SUBJECTIVE:  Patient is asleep and resting Continuous  Meropenem (MERREM) 500 mg in sodium chloride 0.9% 100 mL MBP, 500 mg, Intravenous, Q12H  norepinephrine (LEVOPHED) 4 mg/250 ml premix infusion, 0.5-30 mcg/min, Intravenous, Continuous  Normal Saline Flush 0.9 % injection 10 mL, 10 mL, Intraveno Units, 1-5 Units, Subcutaneous, TID CC and HS          Assessment/Plan:   1. Met with the nursing staff and she reported the patient has no more fresh bleeding from the vagina. Hemoglobin stable     Dispo: Vaginal bleeding has stopped.   She may continue

## 2020-03-22 NOTE — PLAN OF CARE
More engaged with staff and cooperating with medication and treatment leonela,  Problem: CARDIOVASCULAR - ADULT  Goal: Maintains optimal cardiac output and hemodynamic stability  Description  INTERVENTIONS:  - Monitor vital signs, rhythm, and trends  - Mon bleeding  - Monitor lab trends  - Patient is to report abnormal signs of bleeding to staff  - Avoid use of toothpicks and dental floss  - Use electric shaver for shaving  - Use soft bristle tooth brush  - Limit straining and forceful nose blowing  Outcome:

## 2020-03-22 NOTE — PROGRESS NOTES
[facility]    Progress Note    Keisha Hill Patient Status:  Inpatient    1945 MRN [id number]   [facility] 3NE-A Attending Kris Coffey, DO   Hosp Day # 21 PCP Yung Vargas MD       Subjective:     BP better   Swollen   Prefer room on March 1 with anemia.     ROLANDO -baseline serum creatinine 1.0 -1.2 mg/dL  Acute rise in serum creatinine on 3/11; improved with IV fluid. Repeat ROLANDO due to relative hypotension. Continue with supportive care, IV fluid, vasopressors as needed.

## 2020-03-22 NOTE — PROGRESS NOTES
Critical Care Progress Note        NAME: Andrew Stout - ROOM: 008/486-G - MRN: [id number] - Age: 69year old - : 1945  Date of Admission: 3/1/2020  3:24 PM  Admission Diagnosis: Vaginal bleeding [N93.9]  Symptomatic anemia [D64.9]      Last 24hrs: Infusing Medication:  • NiCARdipine     • dexmedetomidine Stopped (03/21/20 0823)   • norepinephrine Stopped (03/20/20 6970)   • cangrelor [facility]) infusion FOR BRIDGING 0.75 mcg/kg/min (03/21/20 2004)     PRN Medication:Zolpidem Tartrate, diphenhydrAMIN Martin Bautista  -relayed to family  7. PAD  -left foot/ankle w/ increased pain overnight  -pulses appear relatively stable on exam this am though toes may be a little duskier  -will notify PVS and await recommendations  8.  CAD/recent STEMI - sinus tach now  -no DV

## 2020-03-22 NOTE — PROGRESS NOTES
Neurology follow up NOTE    SUBJECTIVE:  Pt was seen this am. There was a report yesterday for mild right facial droop and increased slurred speech, it has been persistent, but not worsening per Pt. She felt in general weak, fatigue, poor appetite.  When we perform on left side. not be able to perform lower extremities. GAIT & STATION:     Gait: not be able to walk. SPEECH:    Slight dysarthria when compared with the exam on 3/19, but no aphasia.         BMP:   No results found for: GLUCOSE  Lab Results to have CT of brain to r/o ICH.

## 2020-03-22 NOTE — PLAN OF CARE
Received awake, oriented x 4, breathing shallow RR 25 to 30/min, O2 AT 2L/NC, no desaturation. Kengreal drip ongoing via right upper arm midline catheter. Abdomen is obese, soft, non rigid, good bowel sounds.    Right upper abdomen biliary drain to drainage

## 2020-03-22 NOTE — PROGRESS NOTES
[facility]  Progress Note      Abhay Payment Patient Status:  Inpatient    1945 MRN [id number]   [facility] 4SW-A Attending Lus Bloch, [address] [facility] Day # 21 PCP lEvin Peter MD       69year-old female with acute cholecystit

## 2020-03-23 NOTE — PROGRESS NOTES
INFECTIOUS DISEASE PROGRESS NOTE    Juan M Stewart Patient Status:  Inpatient    1945 MRN [id number]   [facility] 3NE-A Attending Lion Mckinley MD   [facility] Day # 25 PCP Kamila Roberson [facility]) 50 mg in sodium chloride 0.9% 250 mL IVPB for BRIDGING, 0.75 mcg/kg/min, Intravenous, Continuous  •  insulin detemir (LEVEMIR) 100 UNIT/ML flextouch 5 Units, 5 Units, Subcutaneous, Nightly  •  Acetaminophen-Codeine #3 (TYLENOL #3) 300-30 MG tab membranes. No thrush  Neck: No lymphadenopathy. Supple. Respiratory: Lungs clear bilaterally. No wheezes. Decreased at bases. Accessory muscle use noted. Cardiovascular: S1, S2. Tachycardic. Regular rhythm. +TIA.  Pacemaker pocket w/o signs of infection results for input(s): Timothy Grimes, [address], [address], [address], [address], [address] [facility], [address] [facility], [facility] [address], [address], NITRITE, LEUUR, WBCUR, RBCUR, BACUR, EPIUR in the last 168 hours. Microbiology  Reviewed in EMR,   Hospital Encounter on 03/01/20   1.  BL 0. 46. Abdominal CT 3/17 showed increased fluid collection in pelvic region which may have just been secondary to bleeding. 2. Acute cholecystitis; HIDA consistent with acute cholecystitis, s/p placement of cholecystotomy tube by IR 3/18. Cx negative.    3.

## 2020-03-23 NOTE — PLAN OF CARE
Assumed care of pt this am following RN report. Pt awake, oriented x 4. Pt seems to understand plan of care, however refusing care at times, ongoing education. Pt verbally abusive towards staff this evening with foul language.  Pt increasingly more tachycar

## 2020-03-23 NOTE — PROGRESS NOTES
Subjective     No overnight events    Patient is still refusing CT Brain    • potassium chloride  20 mEq Intravenous Once   • cetirizine  10 mg Oral Daily   • famoTIDine  40 mg Oral Daily   • Midodrine HCl  5 mg Oral TID   • meropenem  500 mg Intravenous Q

## 2020-03-23 NOTE — DIETARY NOTE
Clinical Nutrition    Consult to initiate enteral nutrition given pt's prolonged poor PO intake. DHT to be placed this afternoon per RN. Recommend Jevity 1.5 @ 25 ml/hr advancing by 10 ml q 4 hours until goal rate of 45 ml/hr x 24 hours.  Water flush 250 ml

## 2020-03-23 NOTE — CM/SW NOTE
Care Progression Note:  Active Acute Medical Issue:  Symptomatic anemia   Hypotension  Acute cholecystitis  New right facial droop    Other Contributing Medical Factors/Dx. :  CAD  LLE ischemia  S/P RADHA/BSO 2/4, dehiscence return to OR 3/4 for hematoma evac

## 2020-03-23 NOTE — PSYCHIATRIC EVALUATION
[facility] Hospitalist Progress Note                                                                   [address] [facility]  5/25/1945    SUBJECTIVE:  She is tachypnic. Not eating well.   Says she is t mg Oral Daily   • famoTIDine  40 mg Oral Daily   • Midodrine HCl  5 mg Oral TID   • meropenem  500 mg Intravenous Q12H   • Normal Saline Flush  10 mL Intravenous Q12H   • fluconazole  200 mg Intravenous Q24H   • pantoprazole (PROTONIX) IV push  40 mg Intra bleed, went to OR 3/4, area of vaginal dehiscence was repaired, blood clots evacuated. - seen by gynecology (see Dr. Ac Coil note)  - Hg now stable. Very minimal spotting per RN.   Follow CBC    # SOB/hypoxia   -chest XR with possible PNA, on abx and ID in the setting of her vascular disease seems to worsen when SBP <130      # Hx lip swelling/?angioedema  - zyrtec, famotidine, benadryl     # DM Type II  - insulin ordered  - monitor      # ROLANDO on CKD, acidosis  Pt seen by renal     Prophy:  DVT: SCDs

## 2020-03-23 NOTE — DIETARY NOTE
[address] [place] [address]     Admitting diagnosis:  Vaginal bleeding [N93.9]  Symptomatic anemia [D64.9]    Ht: 162.6 cm (5' 4.02\")  Wt: 84.1 kg (185 lb 6.5 oz). This is 154 % of IBW  Body mass index is 31.81 kg/m².   IBW: 54.5

## 2020-03-23 NOTE — PLAN OF CARE
Initially alert and oriented, fatigued, flat affect, demonstrates reasonable understanding of care. Crackles bilateral lower posterior lobes (greater on left) and more diminished on right.   Initially 2L NC - presently sleeping with AVAPS 14 500 0.3 10-20/

## 2020-03-23 NOTE — PROGRESS NOTES
[facility]    Progress Note    Rupinder Bella Patient Status:  Inpatient    1945 MRN [id number]   [facility] 3NE-A Attending Melia Monge,    Hosp Day # 25 PCP Sally Cuevas MD       Subjective:     Po intake poor   bipap overn chronic kidney disease who presented to the emergency room on March 1 with anemia.     ROLANDO -baseline serum creatinine 1.0 -1.2 mg/dL  -- Acute rise in serum creatinine on 3/11; improved with IV fluid. -- Repeat ROLANDO due to relative hypotension.  Cr stable

## 2020-03-23 NOTE — OCCUPATIONAL THERAPY NOTE
Attempted to see patient for OT treatment session, however patient w/ increased HR and RR at rest.  Noted patient w/ HR in 130's and RR between 30's-70's and patient w/ c/o SOB.   RN aware and has informed pulmonologist.  Patient had agreed to work with the

## 2020-03-23 NOTE — CM/SW NOTE
PMR saw the patient yesterday. She is currently doing very little with therapy. They will re-evaluate when patient is appropriate to assess.     Teodora Morales LCSW

## 2020-03-23 NOTE — PROGRESS NOTES
Critical Care Progress Note        NAME: Willy [facility] - ROOM: 713/352-M - MRN: [id number] - Age: 69year old - : 1945  Date of Admission: 3/1/2020  3:24 PM  Admission Diagnosis: Vaginal bleeding [N93.9]  Symptomatic anemia [D64.9]      Last 24hrs: norepinephrine Stopped (03/20/20 1350)   • cangrelor (KENGREAL) infusion FOR BRIDGING 0.75 mcg/kg/min (03/23/20 0800)     PRN Medication:Zolpidem Tartrate, diphenhydrAMINE HCl, ondansetron HCl, Acetaminophen-Codeine #3, Prochlorperazine Edisylate, acetamin will need plavix and asa restarted  -cards signed off  9. ROLANDO  -renal following  -indices stable  10.  H/o CVA w/ AMS  -facial droop has improved  -review of neuro notes shows that there is a belief that pt cannot get an MRI because of her pacemaker, pacer

## 2020-03-23 NOTE — PHYSICAL THERAPY NOTE
Attempted to see patient this PM for Physical Therapy session. Patient with elevated respiratory rate and awaiting re-evaluation by pulmonary. Will defer PT services and check status and proceed as appropriate.

## 2020-03-24 NOTE — DIETARY NOTE
NUTRITION INITIAL ASSESSMENT    Pt is at high nutrition risk. Pt does not meet malnutrition criteria. NUTRITION DIAGNOSIS/PROBLEM:    Inadequate oral intake related to inability to consume adequate energy as evidenced by the need for nutrition support. Body Fat/Muscle Mass: BMI- 31.32; well nourished.       2. Fluid Accumulation: Yes, + anasarca    NUTRITION PRESCRIPTION based on IBW:  Calories: 7439-4732 calories/day (25-30 calories per kg)  Protein:  grams protein/day (1.5-2 grams protein per kg)

## 2020-03-24 NOTE — PROGRESS NOTES
Subjective     No overnight events      • potassium chloride 40mEq IVPB (peripheral line)  40 mEq Intravenous Once   • cetirizine  10 mg Oral Daily   • famoTIDine  40 mg Oral Daily   • Midodrine HCl  5 mg Oral TID   • meropenem  500 mg Intravenous Q12H   •

## 2020-03-24 NOTE — PROGRESS NOTES
Patient having ultrasound of the arm  Nursing staff reports no vaginal bleeding  CT scan done yesterday showed pelvic hematoma to be slightly enlarged but her hemoglobin have been stable.   As she has stable hemoglobin, no further intervention is recommende

## 2020-03-24 NOTE — PLAN OF CARE
Patient is alert and oriented, delayed responses. On AVAPs, then 2L NC when not tolerating. ST on the tele. ANGELO and bilary drain intact. Mcclellan draining. Call within reach, will continue to monitor.

## 2020-03-24 NOTE — PROGRESS NOTES
SPOKE WITH DAUGHTERS AND UPDATED ON POC. ORDERS PER APN TO CONTINUE AVAPS. PICC PLACED FOR TPN TO START TONIGHT. WILL CONTINUE TO MONITOR.

## 2020-03-24 NOTE — PROGRESS NOTES
[facility] Hospitalist Progress Note                                                                   [address] [facility]  5/25/1945    SUBJECTIVE:  Laying in bed. Feels weak. Currently on AVAPS. fluconazole  200 mg Intravenous Q24H   • pantoprazole (PROTONIX) IV push  40 mg Intravenous Q12H   • Metoclopramide HCl  5 mg Intravenous Q6H   • insulin detemir  5 Units Subcutaneous Nightly   • magnesium oxide  400 mg Oral TID CC   • gabapentin  400 mg O (see Dr. Haris Presley note)  - Hg now stable. Very minimal spotting per RN. Follow CBC  - repeat CT with increase in hematoma. Hgb stable from yesterday. Apprec Dr. Patrick Hayes review. Will consult hematology as well for further evaluation.      # SOB/hypoxia   -c condition in the setting of her vascular disease seems to worsen when SBP <130      # Hx lip swelling/?angioedema  - zyrtec, famotidine, benadryl     # DM Type II  - insulin ordered  - monitor      # ROLANDO on CKD, acidosis  Pt seen by renal     Prophy:  DVT:

## 2020-03-24 NOTE — PHYSICAL THERAPY NOTE
Pt being followed by physical therapy. Last session Pt able to participate was 3/16/20. Pt remains medically unstable and requiring bipap. Discussed with RN that will place patient ON HOLD until medically appropriate to participate therapy.

## 2020-03-24 NOTE — PROGRESS NOTES
03/24/20 1125   BiPAP   $ RT Standby Charge (per 15 min) 1   $ Vent Care / Non-Invasive Subsequent Day Yes   Device V60   BiPAP / CPAP CE# 6008   Mode AVAPS   Interface Full face mask   Mask Size Medium   Control Settings   Oxygen Percent 30 %   Fatimah Lynne

## 2020-03-24 NOTE — PLAN OF CARE
Initially alert and oriented, fatigued, flat affect, demonstrates reasonable understanding of care. Crackles bilateral lower posterior lobes resolved tonight. Again is more diminished on right.   AVAPS 14 500 0.3 10-20/5, presently refusing, encouraged to

## 2020-03-24 NOTE — PROGRESS NOTES
INFECTIOUS DISEASE PROGRESS NOTE    Lyndsey Sosa Patient Status:  Inpatient    1945 MRN [id number]   [facility] 3NE-A Attending Chet Victor MD   [facility] Day # 23 PCP Raul Oh Intravenous, Q12H  •  Metoclopramide HCl (REGLAN) injection 5 mg, 5 mg, Intravenous, Q6H  •  cangrelor tetrasodium (KENGREAL) 50 mg in sodium chloride 0.9% 250 mL IVPB for BRIDGING, 0.75 mcg/kg/min, Intravenous, Continuous  •  insulin detemir (LEVEMIR) 100 currently breastfeeding. HEENT: No scleral icterus or conjunctival injection. Moist mucous membranes. No thrush  Neck: No lymphadenopathy. Supple. Respiratory: Lungs clear bilaterally. No wheezes. Decreased at bases. Non-labored breathing.   Marybeth Turpin 03/01/20   1. BLOOD CULTURE     Status: None (Preliminary result)    Collection Time: 03/20/20  3:20 PM   Result Value Ref Range    Blood Culture Result No Growth 3 Days N/A   2.  BODY FLUID CULT,AEROBIC AND ANAEROBIC     Status: None    Collection Time: 03 adenopathy. AORTA: No aneurysm or dissection. VASCULATURE: No visible pulmonary arterial thrombus or attenuation. ABDOMEN/PELVIS:  LIVER: No enlargement, atrophy, abnormal density, or significant focal lesion.    BILIARY: Percutaneous cholecystostom Abdominal CT 3/17 showed increased fluid collection in pelvic region which may have just been secondary to bleeding. CT abdomen/pelvis 3/23 fluid collection increased in size from 3/17.   2. Acute cholecystitis; HIDA consistent with acute cholecystitis, s/p meropenem in the next few days and monitor.   Will follow     Lala Chavez MD

## 2020-03-24 NOTE — PROGRESS NOTES
[address] [facility] Patient Status:  Inpatient    1945 MRN [id number]   [facility] 4SW-A Attending Ty Fall MD   [facility] Day # 21 PCP Tammi Chilel MD     Critical Care Progress Note     Date of Admission: 3/1/2 (LEVEMIR) 100 UNIT/ML flextouch 5 Units, 5 Units, Subcutaneous, Nightly  •  Acetaminophen-Codeine #3 (TYLENOL #3) 300-30 MG tab 1 tablet, 1 tablet, Oral, Q4H PRN  •  Prochlorperazine Edisylate (COMPAZINE) injection 10 mg, 10 mg, Intravenous, Q6H PRN  •  ac Drains:239]     General appearance: alert, appears stated age, cooperative and moderate distress  Lungs: diminished breath sounds bilaterally  Heart: regular rate and rhythm  Abdomen: soft, NT, monica drain with bile, ANGELO drain with old blood  Extremities: + dehiscence  -seen by gyn/onc, no intervention needed  7. PAD  -left foot/ankle w/ increased pain Sunday  -PVS aware- no interventions planned for now  8.  CAD/recent STEMI - sinus tach now- possibly due to pain and increased WOB  -no DVT on LE venous dopple

## 2020-03-24 NOTE — PROGRESS NOTES
[facility]    Progress Note    Otilio Lyons Patient Status:  Inpatient    1945 MRN [id number]   [facility] 3NE-A Attending Deepthi Muñoz, DO   Hosp Day # 23 PCP Anthony Wei MD       Subjective:     Back on AVAPS  No other even anemia.     ROLANDO -baseline serum creatinine 1.0 -1.2 mg/dL  -- Acute rise in serum creatinine on 3/11; improved with IV fluid. -- Repeat ROLANDO due to relative hypotension.  Cr stable at 1.6s  -- Continue with supportive care, IV fluid, vasopressors as neede

## 2020-03-25 NOTE — PROGRESS NOTES
Subjective     No overnight events      • potassium chloride  20 mEq Intravenous Once   • Insulin Aspart Pen  1-5 Units Subcutaneous Q6H   • cetirizine  10 mg Oral Daily   • famoTIDine  40 mg Oral Daily   • Midodrine HCl  5 mg Oral TID   • meropenem  500 m checks    Gregoria Dias MD

## 2020-03-25 NOTE — PROGRESS NOTES
03/25/20 0315   BiPAP   $ RT Standby Charge (per 15 min) 1   Device V60   BiPAP / CPAP CE# [id number]   Mode AVAPS   Interface Full face mask   Mask Size Medium   Control Settings   Oxygen Percent 30 %   Inspiratory time 1   Insp rise time 3   AVAPS   Min IPA

## 2020-03-25 NOTE — PLAN OF CARE
Patient is alert and oriented. On AVAPs due to increase breathing rate, ST on tele. Patient denies any chest pain only \"mild\" pain in foot. Daughter called and updated about plan of care. Call light within reach, will continue to monitor.      Pelvic drai

## 2020-03-25 NOTE — PROGRESS NOTES
INFECTIOUS DISEASE PROGRESS NOTE    Latasha Schirmer Patient Status:  Inpatient    1945 MRN [id number]   [facility] 3NE-A Attending Peterson Rust MD   [facility] Day # 24 TERRELL Preston Sodium (PROTONIX) 40 mg in Sodium Chloride 0.9 % 10 mL IV push, 40 mg, Intravenous, Q12H  •  Metoclopramide HCl (REGLAN) injection 5 mg, 5 mg, Intravenous, Q6H  •  cangrelor tetrasodium (KENGREAL) 50 mg in sodium chloride 0.9% 250 mL IVPB for BRIDGING, 0.7 not currently breastfeeding. HEENT: No scleral icterus or conjunctival injection. Moist mucous membranes. No thrush  Neck: No lymphadenopathy. Supple. Respiratory: Lungs clear bilaterally. No wheezes. Decreased at bases. Non-labored breathing.   Nayla Rosa Isela None    Collection Time: 03/20/20  3:20 PM   Result Value Ref Range    Blood Culture Result No Growth 5 Days N/A   2.  BODY FLUID CULT,AEROBIC AND ANAEROBIC     Status: None    Collection Time: 03/18/20  1:29 PM   Result Value Ref Range    Body Fluid Cultur change since 3/24/2020. ASSESSMENT:  1. Pelvic fluid collection (hematoma) in pt s/p hysterectomy- S/p drain placement 3/18 with blood clots aspirated. Cx negative. Hematoma may not have been infected given above. CRP 1.54. Procalcitonin 0.46.  Abdominal and Impression/ Recs as noted above. Pt was awake, seemed very weak and tachypneic, although O2 sats stable. Denied abd pain. Still with vaginal bleeding per RN   No fevers and all cx neg.    Stable off diflucan  Cont to have leukocytosis, but could be diamond

## 2020-03-25 NOTE — PROGRESS NOTES
[facility]  Report of Psychiatric Progress Note    Sabine Lipscomb Patient Status:  Inpatient    1945 MRN [id number]   [facility] 4SW-A Attending Waqas Zacarias MD   [address] Day # 25 PCP Alina Del Angel MD     Date of Admission: 3/1/20 monica s/p perc monica tube, pelvic fluid collection s/p aspiration, DM2, CKD, HTN, pelvic mass s/p bilateral salpingo-oopherectomy on 2/4/20, inferior STEMI, CVA, ROLANDO, and LLE ischemia    Recommendations:  1) She agrees to start a low dose of Xanax 0.25mg e Abscess is favored. \" She agreed to get the fluid collection drained tomorrow.  The CT also showed, \"cholelithiasis with interval distention of the gallbladder with wall thickening and some surrounding pericholecystic fluid suggesting acute cholecystitis\" appetite. Concentration and sleep are fair. She denies hopeless or helpless feelings (once again I think she is minimizing). She has NO suicidal ideation. Interval Hx:  3/19/20- She feels tired and \"drained\".  She has mild anxiety, but denies depresse Stroke [facility])      Past Surgical History:   Procedure Laterality Date   • CARDIAC PACEMAKER PLACEMENT     • CATARACT      left eye   • HYSTERECTOMY     • OTHER      Pacemaker   • VAGINAL HYSTERECTOMY WITH BILATERAL [address] N/A 3/4/2020    Performed by Isabel Chaves 0.9% 100 mL MBP, 500 mg, Intravenous, Q12H  •  norepinephrine (LEVOPHED) 4 mg/250 ml premix infusion, 0.5-30 mcg/min, Intravenous, Continuous  •  Pantoprazole Sodium (PROTONIX) 40 mg in Sodium Chloride 0.9 % 10 mL IV push, 40 mg, Intravenous, Q12H  •  Meto Exam:     Objective       03/25/20  1230   BP: 115/59   Pulse: 103   Resp: 15   Temp: 98 °F (36.7 °C)     Appearance: fair grooming  Behavior: normal psychomotor  Attitude: cooperative and guarded  Gait: not observed    Speech: normal rate, rhythm and volu Unremarkable appearance without contrast.  BILIARY:  There are faint gallstones. There is distension of the gallbladder with a transverse dimension of 5.4 cm.   There is some gallbladder wall thickening and a small amount of stranding in the gallbladder fo Degenerative disc disease L3-4 and L2-3, degenerative changes of the hip joints and SI joints.   LUNG BASES:  There is patchy interstitial changes in the lung bases either representing atelectasis or infiltrate minimally more prominent than on the previous including corpus callosum, optic chiasm and cerebellar tonsils are overall unremarkable. There is no acute intracranial hemorrhage or extra-axial fluid collection identified.        Scattered acute infarcts in the left frontal, left parietal along with

## 2020-03-25 NOTE — PROGRESS NOTES
Patient sleeping comfortably  Nursing staff report no fresh vaginal bleeding. Patient already has a drain placed in the pelvic hematoma by IR, is not draining much.   Hemoglobin stable  No gynecologic intervention is required at this time

## 2020-03-25 NOTE — PROGRESS NOTES
[facility]    Progress Note    Juan M Stewart Patient Status:  Inpatient    1945 MRN [id number]   [facility] 3NE-A Attending Tena Litten, DO   Hosp Day # 25 PCP Christal De Leon MD       Subjective:     Back on AVAPS  Concern for s on March 1 with anemia.     ROLANDO -baseline serum creatinine 1.0 -1.2 mg/dL  -- Acute rise in serum creatinine on 3/11; improved with IV fluid. -- Repeat ROLANDO due to relative hypotension.  Cr stable at 1.6s  -- Continue with supportive care, IV fluid, vasop

## 2020-03-25 NOTE — DIETARY NOTE
Clinical Nutrition- Parenteral Nutrition  Next TPN (Bag #2) to start tonight and will provide: 400 dextrose calories, 300 lipid calories, 30% lipids, 75 grams protein, and 1000 calories (~66% of goal) in 1200 ml fluid. GIR: 1.00 mg/kg/min.   Electrolytes ad

## 2020-03-25 NOTE — PROGRESS NOTES
Rupinder Bella Patient Status:  Inpatient    1945 MRN [id number]   [facility] 4SW-A Attending Tsering Watts MD   Hosp Day # 25 PCP Sally Cuevas MD     Critical Care Progress Note      Assessment/Plan:  1.  Acute Hypoxic resp M.D.  Pulmonary and [facility]     Subjective:  Awake and alert. No pain. Edema better. Needed AVAPS. ?  anxiety    Objective:    Medications:  • Insulin Aspart Pen  1-5 Units Subcutaneous Q6H   • cetirizine  10 mg Oral D ABGHCO3 19.6*   ABGBE -3.5*   TEMP 98.6   SUZIE Positive   SITE Right Radial   DEV Bi-PAP   THGB 9.6*     No results for input(s): BNP in the last 168 hours. No results for input(s): TROP, CK in the last 168 hours.     Imaging: I independently visualized

## 2020-03-25 NOTE — CONSULTS
[facility]  Report of Consultation    Velasquez Urena Patient Status:  Inpatient    1945 MRN [id number]   [facility] 4SW-A Attending Elin Rocha MD   [facility] Day # 24 PCP Gabby Rowe MD     Reason for Consultation:  Vagina smaller kidney on the right side   • Stroke [facility])      Past Surgical History:   Procedure Laterality Date   • CARDIAC PACEMAKER PLACEMENT     • CATARACT      left eye   • HYSTERECTOMY     • OTHER      Pacemaker   • VAGINAL HYSTERECTOMY WITH BILATERAL [place] 500 mg, Intravenous, Q12H  •  norepinephrine (LEVOPHED) 4 mg/250 ml premix infusion, 0.5-30 mcg/min, Intravenous, Continuous  •  Pantoprazole Sodium (PROTONIX) 40 mg in Sodium Chloride 0.9 % 10 mL IV push, 40 mg, Intravenous, Q12H  •  Metoclopramide HCl (R (!) 41, height 1.626 m (5' 4.02\"), weight 82 kg (180 lb 12.4 oz), SpO2 96 %, not currently breastfeeding. General: Patient is alert and oriented x 3, not in acute distress. HEENT: EOMs intact. PERRL. Oropharynx is clear. Neck: No JVD.  No palpable l encounter     Goals of care, counseling/discussion     ROLANDO (acute kidney injury) ([facility] [address]     Hypernatremia     Symptomatic anemia     Hyponatremia     Leukocytosis     Acute kidney injury ([facility] [address]     Hyperglycemia     Vaginal bleeding     Depressive disorder

## 2020-03-25 NOTE — PLAN OF CARE
Alert and oriented, flat affect, demonstrates reasonable understanding of care. Lungs diminished right posterior fields. AVAPS 14 500 0.3 10-20/5, occasionally refuses for a few minutes to an hour or two at a time.   Encouraged to keep breaks short - on 2

## 2020-03-25 NOTE — PROGRESS NOTES
[facility] Hospitalist Progress Note                                                                   [address] [facility]  5/25/1945    SUBJECTIVE:  Laying in bed. Feels \"mad\" that she is on AVAPs. HCl  5 mg Intravenous Q6H   • insulin detemir  5 Units Subcutaneous Nightly   • magnesium oxide  400 mg Oral TID CC   • gabapentin  400 mg Oral Nightly     Continuous Infusions:   • adult 3 in 1 TPN     • dextrose     • adult 3 in 1 TPN 50 mL/hr at 03/25/2 hematoma. Hgb stable from yesterday. Apprec Dr. Marquis Funez review.    - apprec hematology review    # SOB/hypoxia   -chest XR with possible PNA, on abx and ID following  -possible fluid overload - s/p IV lasix  -atelectasis from immobility/pain - ISS   -bipap, zyrtec, famotidine, benadryl     # DM Type II  - insulin ordered  - monitor      # ROLANDO on CKD, acidosis  Pt seen by renal    # anxiety  - requiring precedex today  - psych recs appreciated     Prophy:  DVT: SCDs     Dispo: ICU    I d/w both of patient's da

## 2020-03-25 NOTE — CM/SW NOTE
Care Progression Note:  Active Acute Medical Issue:  Symptomatic anemia   Hypotension  Acute cholecystitis  New right facial droop     Other Contributing Medical Factors/Dx. :  CAD  LLE ischemia  S/P RADHA/BSO 2/4, dehiscence return to OR 3/4 for hematoma tony

## 2020-03-25 NOTE — OCCUPATIONAL THERAPY NOTE
Pt being followed by OT. Last session Pt able to participate was 3/13/20. Pt remains medically unstable and requiring bipap. Discussed with RN that will place patient ON HOLD until medically appropriate to participate therapy.      Thank you for allowing me

## 2020-03-26 NOTE — PROGRESS NOTES
INFECTIOUS DISEASE PROGRESS NOTE    Opal Burrows Patient Status:  Inpatient    1945 MRN [id number]   [facility] 3NE-A Attending Toño Higgins MD   [facility] Day # 25 TERRELL Womack Intravenous, Q6H PRN  •  Dexmedetomidine HCl in NaCl (PRECEDEX) 400 MCG/100ML premix infusion, 0.2-1.5 mcg/kg/hr (Dosing Weight), Intravenous, Continuous  •  Meropenem (MERREM) 500 mg in sodium chloride 0.9% 100 mL MBP, 500 mg, Intravenous, Q12H  •  norepi (!) 126, temperature 98 °F (36.7 °C), resp. rate (!) 39, height 5' 4.02\" (1.626 m), weight 181 lb 3.5 oz (82.2 kg), SpO2 93 %, not currently breastfeeding. HEENT: No scleral icterus or conjunctival injection. Moist mucous membranes.  No thrush  Neck: No l UROBILINOGEN, NITRITE, LEUUR, WBCUR, RBCUR, BACUR, EPIUR in the last 168 hours. Microbiology  Reviewed in EMR,   Hospital Encounter on 03/01/20   1.  BLOOD CULTURE     Status: None    Collection Time: 03/20/20  3:20 PM   Result Value Ref Range    Blood C airspace disease is seen. Retrocardiac consolidation  again noted. Mild blunting of the costophrenic angles. No measurable pneumothorax. CONCLUSION: No significant interval change since 3/24/2020.     ASSESSMENT:  1. Pelvic fluid collection (hematoma) in creatinine  · Monitor hgb  D/w patient. Will d/w Dr. Ari Ramos. Chary Hu PA-C    ID ATTENDING ADDENDUM     Pt seen an examined independently. Chart reviewed. Agree with above. Note has been reviewed by me and modified as needed.   Exam and Impre

## 2020-03-26 NOTE — PROGRESS NOTES
Subjective     No overnight events      • Midodrine HCl  10 mg Oral TID   • Insulin Aspart Pen  1-5 Units Subcutaneous Q6H   • cetirizine  10 mg Oral Daily   • famoTIDine  40 mg Oral Daily   • meropenem  500 mg Intravenous Q12H   • pantoprazole (PROTONIX) checks    Flora Hatch MD

## 2020-03-26 NOTE — PROGRESS NOTES
[facility] Hospitalist Progress Note                                                                   [address] [facility]  5/25/1945    SUBJECTIVE:  \"A little better today\". Slept ok.      OBJECTIVE pantoprazole (PROTONIX) IV push  40 mg Intravenous Q12H   • Metoclopramide HCl  5 mg Intravenous Q6H   • insulin detemir  5 Units Subcutaneous Nightly   • magnesium oxide  400 mg Oral TID CC   • gabapentin  400 mg Oral Nightly     Continuous Infusions:   • Dr. Mando Shelton review.    - apprec hematology review    # SOB/hypoxia   -chest XR with possible PNA, on abx and ID following  -possible fluid overload - s/p IV lasix  -atelectasis from immobility/pain - ISS   -bipap, wean O2 as tolerated  -apprec pulm input    required precedex  - psych recs appreciated - pt agreeable to start xanax 0.25mg q.  She is declining SSRI at this time     Prophy:  DVT: SCDs     Dispo: ICU    Narda Cogan, MD  [facility] IM Hospitalist  Pager: [phone]

## 2020-03-26 NOTE — IMAGING NOTE
S/p perc drain into a complex pelvic fluid collection. Less than 5 ml output for last 2 days. Infection neg thus far, supporting a hematoma. CT abscessogram w/ poss catheter removal scheduled for today, but pt could not come to radiology.   Will try for

## 2020-03-26 NOTE — PLAN OF CARE
Still having tachypnea when off avaps, can slow down her breathing , xanax didn't seem to help her anxiety, was better after albumin and lasix per nephrology, repositioned q 2 hrs for, pt is unable to do much for herself, left side is flacid, can lift righ ordered  - Instruct patient on fluid and nutrition restrictions as appropriate   Outcome: Not Progressing  Goal: Hemodynamic stability and optimal renal function maintained  Description  INTERVENTIONS:  - Monitor labs and assess for signs and symptoms of v gait  - Educate and engage patient/family in tolerated activity level and precautions  - Recommend marquita lift for OOB transfers - Pt still refusing use of lift equipment   Outcome: Not Progressing     Problem: Impaired Activities of Daily Living  Goal: Ach anxiety  - Monitor for signs/symptoms of CO2 retention  Outcome: Not Progressing     Problem: GASTROINTESTINAL - ADULT  Goal: Minimal or absence of nausea and vomiting  Description  INTERVENTIONS:  - Maintain adequate hydration with IV or PO as ordered and toothpicks and dental floss  - Use electric shaver for shaving  - Use soft bristle tooth brush  - Limit straining and forceful nose blowing  Outcome: Not Progressing

## 2020-03-26 NOTE — PROGRESS NOTES
Critical Care Progress Note        NAME: Keisha Hill - ROOM: 561/040-P - MRN: [id number] - Age: 69year old - : 1945  Date of Admission: 3/1/2020  3:24 PM  Admission Diagnosis: Vaginal bleeding [N93.9]  Symptomatic anemia [D64.9]      Last 24hrs: Medication:ALPRAZolam, dextrose, Normal Saline Flush, zolpidem, diphenhydrAMINE HCl, ondansetron HCl, Acetaminophen-Codeine #3, Prochlorperazine Edisylate, acetaminophen, morphINE sulfate, Prochlorperazine Maleate **OR** prochlorperazine, diphenhydrAMINE H least 3 MRIs since then  -should neuro feel that MRI is vital we can pursue it  11. Anasarca- cont with diuresis/albumin prn  12. FEN:  -unwilling to eat.  Hold on dobhoff given the need for NIPPV. On TPN  13. Proph  -SCDs  14.  Dispo:  -Full code    Called

## 2020-03-26 NOTE — DIETARY NOTE
Clinical Nutrition- Parenteral Nutrition  Next TPN (Bag #2) to start tonight and will provide: 400 dextrose calories, 420 lipid calories, 30% lipids, 95 grams protein, and 1200 calories (~80% of goal) in 1375 ml fluid.  GIR: 1.00 mg/kg/min.  -Of note, fluid

## 2020-03-26 NOTE — PLAN OF CARE
Assumed care of patient at 1900. Received on 2 liters nasal cannula. AVAPs at night. Periods of increased anxiety noted. PRN xanax given and APN notified. Increased work of breathing-tachypneic and tachycardic. RT and APN notified.   Patient anxious on mask

## 2020-03-26 NOTE — PROGRESS NOTES
[facility]  Report of Psychiatric Progress Note    Laura Tolentino Patient Status:  Inpatient    1945 MRN [id number]   [facility] 4SW-A Attending Wai Perry MD   [address] Day # 25 PCP Bipin Martinez MD     Date of Admission: 3/1/20 narcissistic     Pertinent Medical Diagnoses:  Acute monica s/p perc monica tube, pelvic fluid collection s/p aspiration, DM2, CKD, HTN, pelvic mass s/p bilateral salpingo-oopherectomy on 2/4/20, inferior STEMI, CVA, ROLANDO, and LLE ischemia    Recommendations: hematoma, seroma. Abscess is favored. \" She agreed to get the fluid collection drained tomorrow.  The CT also showed, \"cholelithiasis with interval distention of the gallbladder with wall thickening and some surrounding pericholecystic fluid suggesting ac motivation and low appetite. Concentration and sleep are fair. She denies hopeless or helpless feelings (once again I think she is minimizing). She has NO suicidal ideation. Interval Hx:  3/19/20- She feels tired and \"drained\".  She has mild anxiety, Essential hypertension    • Hepatitis    • High blood pressure    • High cholesterol    • Migraines    • Muscle weakness    • Neuropathy    • Osteoporosis    • PONV (postoperative nausea and vomiting)    • Psychiatric disorder    • Renal disorder     small Q6H  •  Zolpidem Tartrate (AMBIEN) tab 2.5 mg, 2.5 mg, Oral, Nightly PRN  •  cetirizine (ZYRTEC) tab 10 mg, 10 mg, Oral, Daily  •  famoTIDine (PEPCID) tab 40 mg, 40 mg, Oral, Daily  •  diphenhydrAMINE (BENADRYL) cap/tab 25 mg, 25 mg, Oral, Q6H PRN  •  Ahsan Min PRN **OR** glucose (DEX4) oral liquid 30 g, 30 g, Oral, Q15 Min PRN **OR** Glucose-Vitamin C (DEX-4) chewable tab 8 tablet, 8 tablet, Oral, Q15 Min PRN    Review of Systems   Constitutional: Positive for malaise/fatigue.    Psychiatric/Behavioral: Negat TECHNIQUE:  Unenhanced multislice CT scanning was performed from the dome of the diaphragm to the pubic symphysis. Dose reduction techniques were used.  Dose information is transmitted to the [facility] ([facility] [employer] [facility]) [employer]co Wiggins [address] [place] [address] Radiology anterior inferior aspect of the vaginal cuff. There are few air bubbles in the surrounding soft tissues which are extraluminal along the superior aspect of this fluid collection.   This measures 5.9 x 3.3 x 3.1 cm, previously 4.8 x 2.6 x 322 cm by naga kim STROKE BRAIN DWI ONLY(NO IV)(CPT=70551), 2/05/2020, 15:16. INDICATIONS:  evaluate for stroke     TECHNIQUE:  MRI of the brain was performed with multi-planar T1, T2-weighted images with FLAIR sequences and diffusion weighted images without infusion.

## 2020-03-26 NOTE — PROGRESS NOTES
[facility]    Progress Note    Olman Crawford Patient Status:  Inpatient    1945 MRN [id number]   [facility] 3NE-A Attending Julián Rider, DO   Hosp Day # 25 PCP Husam Nixon MD       Subjective:     Breathing improved this afte kidney disease who presented to the emergency room on March 1 with anemia.     ROLANDO -baseline serum creatinine 1.0 -1.2 mg/dL  -- Acute rise in serum creatinine on 3/11; improved with IV fluid. -- Repeat ROLANDO due to relative hypotension.  Cr stable at 1.5s

## 2020-03-26 NOTE — PROGRESS NOTES
03/26/20 0849   BiPAP   $ RT Standby Charge (per 15 min) 1   $ BiPAP in use daily Yes   Device V60   Mode AVAPS   Interface Full face mask   Mask Size Medium   Control Settings   Set Rate 20 breaths/min   Oxygen Percent 30 %   AVAPS   Min IPAP 10   Max

## 2020-03-27 NOTE — DIETARY NOTE
NUTRITION ASSESSMENT    Pt is at high nutrition risk. Pt does not meet malnutrition criteria.     NUTRITION DIAGNOSIS/PROBLEM:  Inadequate oral intake related to inability to consume adequate energy as evidenced by the need for nutrition support. -ongoing Fat/Muscle Mass: BMI- 31.32; well nourished.       2. Fluid Accumulation: Yes, + anasarca    NUTRITION PRESCRIPTION based on IBW:  Calories: 1998-3127 calories/day (25-30 calories per kg)  Protein:  grams protein/day (1.5-2 grams protein per kg)  Flui

## 2020-03-27 NOTE — PROGRESS NOTES
INFECTIOUS DISEASE PROGRESS NOTE    Keisha Hill Patient Status:  Inpatient    1945 MRN [id number]   [facility] 3NE-A Attending Last Liriano MD   [facility] Day # 26 PCP Jackson Dillard Intravenous, Q6H PRN  •  Dexmedetomidine HCl in NaCl (PRECEDEX) 400 MCG/100ML premix infusion, 0.2-1.5 mcg/kg/hr (Dosing Weight), Intravenous, Continuous  •  Meropenem (MERREM) 500 mg in sodium chloride 0.9% 100 mL MBP, 500 mg, Intravenous, Q12H  •  norepi temperature 99.2 °F (37.3 °C), temperature source Temporal, resp. rate (!) 43, height 5' 4.02\" (1.626 m), weight 176 lb 5.9 oz (80 kg), SpO2 95 %, not currently breastfeeding. HEENT: No scleral icterus or conjunctival injection. Moist mucous membranes.  Juan Diego File EMR,   Hospital Encounter on 03/01/20   1. BLOOD CULTURE     Status: None    Collection Time: 03/20/20  3:20 PM   Result Value Ref Range    Blood Culture Result No Growth 5 Days N/A   2.  BODY FLUID CULT,AEROBIC AND ANAEROBIC     Status: None    Collection PICC. CONCLUSION:   1. Improving pulmonary vascular congestion. 2. No change in dense left base/retrocardiac consolidation. 3. Continued clinical correlation and follow-up recommended.     Dictated by: Christie Chadwick MD on 3/27/2020 at 11:25 AM   Georga Peabody CT abscessogram (possibly today if able to be perform)  · Continue to follow temperatures and WBCs  · Monitor creatinine  · Monitor hgb  D/w patient and RN. HERON Rios ATTENDING ADDENDUM     Pt seen an examined independently serenity

## 2020-03-27 NOTE — CM/SW NOTE
Care Progression Note:  Active Acute Medical Issue:  Symptomatic anemia   Hypotension  Acute cholecystitis  New right facial droop  Anasarca     Other Contributing Medical Factors/Dx. :  CAD  LLE ischemia  S/P RADHA/BSO 2/4, dehiscence return to OR 3/4 for he

## 2020-03-27 NOTE — PROGRESS NOTES
Subjective     No overnight events      • Labetalol HCl       • furosemide  40 mg Intravenous Once   • Midodrine HCl  10 mg Oral TID   • Clopidogrel Bisulfate  75 mg Oral Daily   • aspirin  81 mg Oral Daily   • insulin detemir  8 Units Subcutaneous Nightly (this was started by cardiology); from a neuro perspective, okay to switch to Plavix  - goal sBP: 130-170  - neuro checks    Jadiel Jade MD

## 2020-03-27 NOTE — PROGRESS NOTES
[facility] Hospitalist Progress Note                                                                   [address] [facility]  5/25/1945    SUBJECTIVE:  \"Good\" when asked how she is doing.  Denies SOB, Oral Daily   • famoTIDine  40 mg Oral Daily   • meropenem  500 mg Intravenous Q12H   • pantoprazole (PROTONIX) IV push  40 mg Intravenous Q12H   • Metoclopramide HCl  5 mg Intravenous Q6H   • magnesium oxide  400 mg Oral TID CC   • gabapentin  400 mg Oral repeat CT with increase in hematoma. Hgb stable from yesterday. Apprec Dr. Silvia Jj review.    - apprec hematology review    # SOB/hypoxia   -chest XR with possible PNA, on abx and ID following  -possible fluid overload - s/p IV lasix  -atelectasis from immob monitor      # ROLANDO on CKD, acidosis  Pt seen by renal    # anxiety  - has required precedex  - psych recs appreciated - pt agreeable to start xanax 0.25mg q.  She is declining SSRI at this time     Prophy:  DVT: SCDs     Dispo: ICU     Mayur Prasad MD  [facility]

## 2020-03-27 NOTE — PROGRESS NOTES
[facility]  Report of Psychiatric Progress Note    Otilio Lyons Patient Status:  Inpatient    1945 MRN [id number]   [facility] 4SW-A Attending Remington Li MD   [facility] Day # 32 PCP Anthony Wei MD     Date of Admission: 3/1/20 the patient and her daughters are in agreement. 2) Continue Xanax 0.25mg twice a day PRN anxiety and 0.5mg nightly PRN anxiety or insomnia.      3) Continue Precedex prn agitation    Hi Rene    History of Present Illness:  68 yo female was admitted affected her decision making. But with much time spent explaining the reasons for the treatment recommendations, she understood, appreciated, and rationalized enough to communicate a choice.      Regarding her choices on this admission, she refused the HIDA ANXIOUS and says it is associated with dyspnea and chest tightness. She feels a loss of control. She is open to taking an anxiolytic like Xanax prn now. She is not open to starting a SSRI.  Her main stressors: 1) health issues 2) finances 3) not being able Pacemaker   • VAGINAL HYSTERECTOMY WITH BILATERAL [address] N/A 3/4/2020    Performed by Micah Blackburn MD at [facility]   • XI ROBOT-ASSISTED LAPAROSCOPIC HYSTERECTOMY Bilateral 2/4/2020    Performed by Micah Blackburn MD at [address] [facility] Q6H PRN  •  Dexmedetomidine HCl in NaCl (PRECEDEX) 400 MCG/100ML premix infusion, 0.2-1.5 mcg/kg/hr (Dosing Weight), Intravenous, Continuous  •  Meropenem (MERREM) 500 mg in sodium chloride 0.9% 100 mL MBP, 500 mg, Intravenous, Q12H  •  norepinephrine (LEV 03/27/20  0800   BP:    Pulse:    Resp:    Temp: 98.4 °F (36.9 °C)     Appearance: fair grooming, appears distressed  Behavior: shallow breathing  Gait: not observed    Speech: conversational dyspnea, speaks only a few words at a time  Mood: anxious, depre There is distension of the gallbladder with a transverse dimension of 5.4 cm. There is some gallbladder wall thickening and a small amount of stranding in the gallbladder fossa suggesting acute cholecystitis.     There is some mass effect upon the adjacent and SI joints. LUNG BASES:  There is patchy interstitial changes in the lung bases either representing atelectasis or infiltrate minimally more prominent than on the previous study. Pacemaker leads are noted. There is cardiomegaly.   There is coronary ar unremarkable. There is no acute intracranial hemorrhage or extra-axial fluid collection identified. Scattered acute infarcts in the left frontal, left parietal along with left occipital lobe is noted.        Bony exostosis along the left parietal/

## 2020-03-27 NOTE — CONSULTS
Merlin [address]  [id number]  Hospital Day #26  Date of Consult:   3/27/2020        Reason for Consultation:      Consult requested by  for evaluation of palliative care needs,  Goals of care.      H vomiting)    • Psychiatric disorder    • Renal disorder     smaller kidney on the right side   • Stroke [facility])      Past Surgical History:   Procedure Laterality Date   • CARDIAC PACEMAKER PLACEMENT     • CATARACT      left eye   • HYSTERECTOMY     • OTHER infusion, , Intravenous, Continuous PRN  •  Normal Saline Flush 0.9 % injection 10 mL, 10 mL, Intravenous, PRN  •  Insulin Aspart Pen (NOVOLOG) 100 UNIT/ML flexpen 1-5 Units, 1-5 Units, Subcutaneous, Q6H  •  Zolpidem Tartrate (AMBIEN) tab 2.5 mg, 2.5 mg, O glucose (DEX4) oral liquid 15 g, 15 g, Oral, Q15 Min PRN **OR** Glucose-Vitamin C (DEX-4) chewable tab 4 tablet, 4 tablet, Oral, Q15 Min PRN **OR** dextrose 50 % injection 50 mL, 50 mL, Intravenous, Q15 Min PRN **OR** glucose (DEX4) oral liquid 30 g, 30 g, Warm and dry.     Palliative Performance Scale: 35%    Palliative Performance Scale   % Ambulation Activity Level Self-Care Intake Consciousness   100 Full Normal Full   Normal Full   90 Full No disease  Normal Full Normal Full   80 Full Some disease  Sydney Kwan to visit Reanna Chin to allow them to discuss her code status. I agreed with the daughters this decision can not be made easily over the phone and they feel they understand their mother the best and will know her true response.    Simon Boyer and Susanne Goff called back after readmissions, aggressive treatment and evaluation of potential new symptoms do not benefit the patient nor provide quality of life. Consideration for hospice support when comfort care is appropriate.        CODE STATUS CHANGED TO DNR/DNI with selective herbert

## 2020-03-27 NOTE — PLAN OF CARE
Assumed care after RN report; pt resting in bed. A&O; follows cmds. Very weak. Tachypneic w accessory muscle use on 3L nc. Xanax dose given; pt placed on AVAPs. Mcclellan w/ adequate output. Hypotensive; CC APN RUTH Wolff aware; albumin given; then Levo gtt start

## 2020-03-27 NOTE — IMAGING NOTE
68 yo F w/ perc monica tube as well as pelvic drain. Output stable from RUQ drain at 150 cc/d. This will likely be left in place for a minimum of 6 weeks or until cholecystectomy.   Additional pelvic drain with slightly increased output to 20 cc, previousl

## 2020-03-27 NOTE — PROGRESS NOTES
[facility]    Progress Note    Safiamerrick Peñas Patient Status:  Inpatient    1945 MRN [id number]   [facility] 3NE-A Attending Justin Ott, [address] [facility] Day # 32 PCP Odalys Hill MD       Subjective:      On levo this am   No other e with anemia.     ROLANDO -baseline serum creatinine 1.0 -1.2 mg/dL  -- Acute rise in serum creatinine on 3/11; improved with IV fluid. -- Repeat ROLANDO due to relative hypotension.  Cr stable at 1.5s  -- Continue with supportive care, IV fluid, vasopressors as

## 2020-03-27 NOTE — PROGRESS NOTES
Critical Care Progress Note        NAME: Margarito Pang - ROOM: [facility]/914-V - MRN: [id number] - Age: 69year old - : 1945  Date of Admission: 3/1/2020  3:24 PM  Admission Diagnosis: Vaginal bleeding [N93.9]  Symptomatic anemia [D64.9]      Last 24hrs: (03/27/20 0462)     PRN Medication:Labetalol HCl, ALPRAZolam, ALPRAZolam, dextrose, Normal Saline Flush, zolpidem, diphenhydrAMINE HCl, ondansetron HCl, Acetaminophen-Codeine #3, Prochlorperazine Edisylate, acetaminophen, morphINE sulfate, Prochlorperazine had at least 3 MRIs since then  -should neuro feel that MRI is vital we can pursue it  11. Anasarca- cont with diuresis/albumin prn  12. FEN:  -unwilling to eat.  Hold on dobhoff given the need for NIPPV. On TPN  13. Proph  -SCDs  14.  Dispo:  -Full code

## 2020-03-27 NOTE — PROGRESS NOTES
03/27/20 0230   BiPAP   $ RT Standby Charge (per 15 min) 1  (bipap)   Device V60   BiPAP / CPAP CE# 6053   Mode AVAPS   Interface Full face mask   Mask Size Medium   Control Settings   Set Rate 14 breaths/min   Set EPAP 5   Oxygen Percent 30 %   Andre Altman

## 2020-03-28 NOTE — PROGRESS NOTES
[facility]    Progress Note    Hasmukh Pat Patient Status:  Inpatient    1945 MRN [id number]   [facility] 3NE-A Attending Sebastian Larose, DO   Hosp Day # 32 PCP Clarance Halsted, MD       Subjective: On levo .  On bipap  No other presented to the emergency room on March 1 with anemia.     ROLANDO -baseline serum creatinine 1.0 -1.2 mg/dL  -- Acute rise in serum creatinine on 3/11; improved with IV fluid. -- Repeat ROLANDO due to relative hypotension.  Cr stable slightly worse today, hold

## 2020-03-28 NOTE — PLAN OF CARE
Received pt very sleepy. Would only give 1 word answers on Precedex. Precedex weaned off but pt no more alert until approx 1330. Pt awake and oriented but again becoming more agitated and Precedex restarted at low dose.  Pt demanding Cangelor be restarted o

## 2020-03-28 NOTE — DIETARY NOTE
Clinical Nutrition- Parenteral Nutrition  Next TPN (Bag #5) to start tonight and will provide: 400 dextrose calories, 420 lipid calories, 35% lipids, 95 grams protein, and 1200 calories (~80% of kcals and 100% of protein goal) in 1375 ml fluid.  GIR: 1.03 m

## 2020-03-28 NOTE — PROGRESS NOTES
Subjective     Patient is more sleepy today after Benadryl      • Insulin Aspart Pen  2-10 Units Subcutaneous Q6H   • Midodrine HCl  10 mg Oral TID   • Clopidogrel Bisulfate  75 mg Oral Daily   • aspirin  81 mg Oral Daily   • insulin detemir  8 Units Subcu loosen up the BP parameters; she is on a levo gtt which is likely causing other adverse effects (I.e. worsening her PVD)  - the patient's daugther and I are in agreement with a goal sBP > 125  - continue monitoring examination  - start Plavix 75 mg daily w

## 2020-03-28 NOTE — PLAN OF CARE
Assumed care at [address], patient asleep on avaps. Attempted to do oral care. Patient desaturated to high 80s-low 90s and heart rate 150s after less then 1 minute. Anxious from having mask on. Xanax PRN given. Will continue to monitor.

## 2020-03-28 NOTE — PROGRESS NOTES
[facility] Pharmacy Note: Route Optimization for Famotidine (PEPCID)    Patient is currently on Famotidine (PEPCID) 20 mg po every 12 hours. The patient is not able to take po meds at this time.  Medication was changed from po formulation to Famotidine (PEPCID)

## 2020-03-28 NOTE — PROGRESS NOTES
03/28/20 0911   BiPAP   $ RT Standby Charge (per 15 min) 1   $ Vent Care / Non-Invasive Subsequent Day Yes   Device V60   BiPAP / CPAP CE# 6088   Mode AVAPS   Interface Full face mask   Mask Size Large   Control Settings   Oxygen Percent 30 %   Inspirat

## 2020-03-28 NOTE — PROGRESS NOTES
Critical Care Progress Note        NAME: Reyes Lezama - ROOM: [facility]/963- - MRN: [id number] - Age: 69year old - : 1945  Date of Admission: 3/1/2020  3:24 PM  Admission Diagnosis: Vaginal bleeding [N93.9]  Symptomatic anemia [D64.9]      Last 24hrs: Medication:Labetalol HCl, ALPRAZolam, ALPRAZolam, dextrose, Normal Saline Flush, zolpidem, diphenhydrAMINE HCl, ondansetron HCl, Acetaminophen-Codeine #3, Prochlorperazine Edisylate, acetaminophen, morphINE sulfate, Prochlorperazine Maleate **OR** prochlor because of her pacemaker, pacer was placed in 2018, pt has had at least 3 MRIs since then  -should neuro feel that MRI is vital we can pursue it  12. Anasarca- cont with diuresis/albumin prn  13.  FEN:  -unwilling to eat.  Hold on dobhoff given the need for

## 2020-03-28 NOTE — PLAN OF CARE
Assumed care of patient at [phone]; Bipap in place; low sats, patient agitated and trying to remove mask. ST; high RR, accessory muscle use. Explained to her the continued need and she said that she did not want it.   She stated she did not want to be intubate

## 2020-03-28 NOTE — PROGRESS NOTES
[facility] Hospitalist Progress Note                                                                   [address] [facility]  5/25/1945    SUBJECTIVE:  Pt seen and examined.   Responding to commands on B mg Oral Daily   • meropenem  500 mg Intravenous Q12H   • pantoprazole (PROTONIX) IV push  40 mg Intravenous Q12H   • Metoclopramide HCl  5 mg Intravenous Q6H   • magnesium oxide  400 mg Oral TID CC   • gabapentin  400 mg Oral Nightly     Continuous Infusio stable from yesterday. Apprec Dr. Jose Rafael Padilla review.    [facility]    # SOB/hypoxia   -chest XR with possible PNA, on abx and ID following  -possible fluid overload - s/p IV lasix  -atelectasis from immobility/pain - ISS   -bipap, wean O2 as jared stenosis  - as above, medical management  - per daughter is sensitive to drops in BP, does better with SBPs closer to 130 (goal now 125 per discussion with neuro)     # Hx lip swelling/?angioedema  - zyrtec, famotidine, benadryl     # DM Type II  - -

## 2020-03-28 NOTE — PROGRESS NOTES
INFECTIOUS DISEASE PROGRESS NOTE    Juan M Stewart Patient Status:  Inpatient    1945 MRN [id number]   [facility] 3NE-A Attending Lion Mckinley MD   [facility] Day # 32 PCP Kamila Roberson (LEVOPHED) 4 mg/250 ml premix infusion, 0.5-30 mcg/min, Intravenous, Continuous  •  Pantoprazole Sodium (PROTONIX) 40 mg in Sodium Chloride 0.9 % 10 mL IV push, 40 mg, Intravenous, Q12H  •  Metoclopramide HCl (REGLAN) injection 5 mg, 5 mg, Intravenous, Q6H Lungs clear bilaterally. No wheezes. Decreased at bases bilaterally. Accessory muscle use. Cardiovascular: S1, S2. Tachycardic. Regular rhythm. +TIA. Pacemaker pocket w/o signs of infection  Abdomen: Soft, non-tender to light palpation, nondistended.   Pos Culture Result No Growth 5 Days N/A   2.  BODY FLUID CULT,AEROBIC AND ANAEROBIC     Status: None    Collection Time: 03/18/20  1:29 PM   Result Value Ref Range    Body Fluid Culture Result No Growth 5 Days N/A    Body Fld Smear 1+ Neutrophils seen N/A    Sofie Pulido and follow-up recommended.     Dictated by: Juancarlos Borja MD on 3/27/2020 at 11:25 AM   Finalized by: Juancarlos Borja MD on 3/27/2020 at 11:27 AM     ASSESSMENT:  1. Pelvic fluid collection (hematoma) in pt s/p hysterectomy- S/p drain placement 3/18 with blood cl Paul. Cheyanne Cárdenas PA-C

## 2020-03-28 NOTE — PROGRESS NOTES
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    Subjective: Samuel is a new patient that is a 45 year old female, medical assistant in geriatrics, who presents today for evaluation and treatment of neck, mid and lower back pain after falling down 3 stairs about 3 weeks ago at her home. The neck is concentrated more on the left and upper/mid back pain is bilateral and is described as constant, moderate to severe, tight, sore, achy and burning with radiating pain into left shoulder/upper. Heat and using naproxen temporarily decreases pain. Rotation, reaching and looking down increases pain. No previous chiropractic care. Pain level is fluctuating between 7/10 and 9/10. Lower back is described as constant, moderate to severe, sharp, stabbing and throbbing with radiating pain into the left gluteal and hip area. Sitting too long, bending, standing too long and rising from a seated position increases pain. Pain level is also fluctuating between 7/10 and 9/10.    Objective:  PHYSICAL EXAM    Reflexes:  +2/5 bilaterally in the upper and lower extremities.    Pathologic reflex(s):  Fitzgerald's sign: Negative  Tromner's sign: Negative    Muscle strength:  Strong and rated at +5/5 bilaterally in the upper and lower extremities.    Neurologic sensations:   (Sharp/Dull, Light Touch)  All sensations are bilaterally intact in the upper and lower extremities.    Passive range of motion-Cervical is moderately decreased in extension and left and right rotation and mildly in all other ranges  Passive range of motion-lumbar is moderate to severely limited in forward flexion and severely in extension with moderate reduction of left and right lateral flexion    Objective findings   Moderate to severe palpatory tenderness with guarding from the  Notified of increased glucose levels. 1800 dose Novolog not given. Glucose levels maintaining>200 with current Novolog dosing. Sliding scale increased to medium dosing.       HARIS Danielle  Critical Care NP  Christel 227: [phone]  Pgr: [id number] patient is noted over the cervical spine on the left, thoracic spine, lumbar spine and primarily the left sacroiliac joint at the PSIS and PIIS levels. Moderate to severe grade joint restrictions are noted at the following levels: C0-C3, C6-T3, T8-L1, L3-S1 and at the bilateral sacroiliac joints. Muscle hypertonicity is graded at 3-4 or moderate to severe and is contributing to restricted mobility in the involved areas. These areas include the hamstring, gluteal musculature, anterior pelvic musculature especially on the left and the cervical, thoracic and lumbar paraspinals.    Review of systems:    General: Well-nourished, Well-groomed and Cooperative  CV: Auscultation wnl and Carotid artery wnl  Skin: Inspection of skin/SQ tissue wnl and Palpation of skin/SQ tissue wnl  Neuro: Cerebellar wnl  Psych: Judgement wnl, Recent/remote memory wnl and Mood/affect wnl    Orthopedic Test:  [place]'s:  Positive both actively and passively bilaterally  Cervical Compression:  Positive with extension and left lateral flexion  Brant's:  Positive on the left  C/S Distraction:  Positive  Capillary Refill:  Within normal limits  Gaxiola's test:  Positive localized pain lumbar spine bilaterally  Straight leg raise test:  Negative but tight hamstrings limits straight leg raise starting at 45°  Rodrigues's test:  Positive primarily on the left but also mildly on the right  Moise's test:  Increases pain over the left sacroiliac joint but not the hips  Schepelmann's sign: Positive bilaterally    Postural Remarks:  Reduced cervical and lumbar lordosis.    Remarks:  Patient denies blurred vision or dizziness. Patient denies any loss of bowel or bladder function.     Disability/pain score findings:   The neck disability index is 40% and considered moderate and the Oswestry low back pain questionnaire is graded at 50% and considered severe.    Assessment:  Mild degenerative changes noted in the lumbar spine according to 2017 x-ray report.  Patient presents today with facet related pain in the lumbar spine and also in the cervical and thoracic area. Patient also has left sacroiliac joint pain. Multiple areas of joint restrictions/subluxation and muscle hypertonicity in all of the regions are creating her pain and restricted mobility. Patient tolerated the first treatment very well and reported reduced pain and improved mobility immediately after. I'm anticipating favorable improvements through a course of care as outlined in the treatment plan. I explained that soreness in the initial few treatments is normal and will likely improve as we move through the treatment plan.    Complicating Factors/Co-morbidities:   A BMI over 43 and lack of any regular stretching or exercise at this point.    Working diagnoses:     1. Facet syndrome, lumbar    2. Strain, sacral, initial encounter    3. Cervicalgia    4. Segmental dysfunction of thoracic region      Prognosis:   Good as patient responded very well to initial treatment.    Informed consent: Patient read and verbally questioned me on both risks and benefits related to chiropractic manipulative therapy and adjunct of care. Patient gave verbal consent to treatment. Patient also gave written consent by signing the informed consent sheet.    Treatment today:   All joint restrictions in the cervical, thoracic, lumbar and bilateral sacroiliac joints were treated today and the exact levels are listed in the objective section. These areas were adjusted today utilizing a gentle diversified technique to correct aberrant joint function and reduce scar tissue formation. This procedure was done without incident at the site(s) of restriction.    Therapeutic exercises including post isometric relaxation stretching was performed to the following areas: Hamstring and gluteal musculature as well as the anterior pelvic musculature on the left and the paraspinals to improve range of motion, strength, balance and coordination to  the involved sites for 8-15 minutes. Patient tolerated procedure well.    Plan:  I'm recommending 2 treatments per week with chiropractic manipulative therapy for the next 4-8 weeks. I will reevaluate the patient after the first 6 treatments to assess for continued need and make every effort to reach our treatment goals and release the patient between 6 and 12 treatments total. Therapeutic exercises and passive modalities will be recommended throughout the treatment as clinically warranted as outlined in the therapy plan section.     THERAPY PLAN & GOALS  From Date:  March 14, 2017  Until Date: 4-8 weeks    CHIROPRACTIC MANIPULATIVE THERAPY and/ or Plaza Flexion/distraction technique is recommended for 1-3 treatment per/week for 4-8 weeks to decrease scar tissue formation, restore joint function, decreased disc compression and reduce pain and inflammation. RECOMMENDED LEVELS: 83520 or 4 regions              Passive modalities that may be utilized include: Interferential Current  at the low frequency setting for 15 minutes for 0-3 treatments per week for 4-8wks and/or HV/US Comb combination therapy at a 1MHz setting and at 2.0Wcm2 with the 100% duty cycle for 8 minutes at a rate of 0-3 treatments per week for 4-8wks as clinically warranted.    REGIONS TREATED WITH ABOVE MODALITIES INCLUDE:  Cervical, thoracic, lumbar and bilateral sacroiliac area to reduce pain/inflammation, decrease muscle hypertonicity, increase circulation, breakup adhesions and scar tissue and promote tissue healing.     Therapeutic exercises including PIR/PNF stretches will be performed to the following regions: Hamstring, gluteal musculature, left anterior pelvic musculature as well as the cervical, thoracic and lumbar paraspinals to increase passive and active range of motion, improve flexibility and strength.     SHORT TERM GOALS    Decrease numerical pain score 100% from 9/10 at worst in all areas to 0/10 for all area, Decrease neck  pain/disability from 40% to less than or equal to 8%, Oswestry Low Back Pain Questionnaire from 50% to less than or equal to 5% and Increase PROM to WNL or best possible given pathologies    FUNCTIONAL GOALS:    Engage in normal daily and recreational activities without pain or difficulty.    Patient Instruction/Education:   Patient advised to utilize ice or cold compresses over the involved regions at home to help reduce pain and inflammation as needed. Patient stated understanding of, and was in agreement with, the discussed instructions.    Other treatment options discussed with patient:  Nothing recommended at this point.

## 2020-03-29 NOTE — PROGRESS NOTES
Pharmacy Note: Renal dose adjustment for Cetirizine (Zyrtec)    Ravinder Mims has been prescribed Cetirizine (Zyrtec) 10 mg orally daily     Estimated Creatinine Clearance: 26.9 mL/min (A) (based on SCr of 1.63 mg/dL (H)).     Her calculated creatinine urban

## 2020-03-29 NOTE — PROGRESS NOTES
INFECTIOUS DISEASE PROGRESS NOTE    Juan F Herman Patient Status:  Inpatient    1945 MRN [id number]   [facility] 3NE-A Attending Mariah Camarillo MD   [facility] Day # 28 TERRELL Cardoza (Dosing Weight), Intravenous, Continuous  •  Meropenem (MERREM) 500 mg in sodium chloride 0.9% 100 mL MBP, 500 mg, Intravenous, Q12H  •  norepinephrine (LEVOPHED) 4 mg/250 ml premix infusion, 0.5-30 mcg/min, Intravenous, Continuous  •  Pantoprazole Sodium or conjunctival injection. Dry mucous membranes. Neck: No lymphadenopathy. Supple. Respiratory: Lungs clear bilaterally. No wheezes. Decreased at bases bilaterally. Cardiovascular: S1, S2. Tachycardic. Regular rhythm. +TIA.  Pacemaker pocket w/o signs o SEAMUS in the last 168 hours. Microbiology  Reviewed in EMR,   Hospital Encounter on 03/01/20   1. BLOOD CULTURE     Status: None    Collection Time: 03/20/20  3:20 PM   Result Value Ref Range    Blood Culture Result No Growth 5 Days N/A   2.  BODY FLUID 3/23 with moderate bilateral pleural effusions with compressive atelectasis/consolidation. 5. Acute resp failure- assume due to above + a component of fluid OL; on nasal cannula. 6.  PAD- changes to L foot seem worse but no signs of infection, does not se

## 2020-03-29 NOTE — PLAN OF CARE
Resumed care at 0730, pt on AVAPs. Pt placed on 6L NC by RT. Oxygen titrated to maintain O2 sats above 92%. Pt on a levo gtt, titrated to maintain SBP >120. Pt continues to receive TPN, cangrelor, and precedex.  Pt increased to high dose sliding scale and s

## 2020-03-29 NOTE — PLAN OF CARE
1630 Received pt from ICU. Pt very anxious, restless, precedex started and titrated to max 1.5 mcg. RR 40 at times, on BIPAP, SPO2 93-96, lungs dim bilat. Pt denies pain. Dr. Mike Vyas notified, no new orders.

## 2020-03-29 NOTE — PROGRESS NOTES
Subjective     Patient is more sleepy today after Benadryl      • cetirizine  5 mg Oral Daily   • Insulin Aspart Pen  4-20 Units Subcutaneous TID CC and HS   • insulin detemir  15 Units Subcutaneous Nightly   • diphenhydrAMINE HCl  12.5 mg Intravenous Q8H parameters; she is on a levo gtt which is likely causing other adverse effects (I.e. worsening her PVD)  - Today, I spoke to the patient's daughter again; we agreed to lower the sBP goal to greater than 120  - continue monitoring examination  - start aDvid

## 2020-03-29 NOTE — PLAN OF CARE
Recieved pt. On BIPAP and Precedex at 1.5mcg. Weaning as tolerated. Drowsy but does follow simple commands. Levophed titrated to keep -130. Blood sugars treated per orders. TPN continues. Moderate amount of blood noted from vagina.   Good urine

## 2020-03-29 NOTE — PROGRESS NOTES
[facility]    Progress Note    Amarilis Wetzel Patient Status:  Inpatient    1945 MRN [id number]   [facility] 3NE-A Attending Mike Nails, DO   Hosp Day # 28 PCP Siomara Tse MD       Subjective:      On AVAPS  No other events no the emergency room on March 1 with anemia.     ROLANDO -baseline serum creatinine 1.0 -1.2 mg/dL  -- Acute rise in serum creatinine on 3/11; improved with IV fluid. -- Repeat ROLANDO due to relative hypotension.  Cr improved off lasix  -- Continue with supportiv

## 2020-03-29 NOTE — PROGRESS NOTES
[facility] Pulmonary, Critical Care and Sleep    Keisha Hill Patient Status:  Inpatient    1945 MRN [id number]   [facility] 6NE-A Attending Dorian Angulo MD   [address] Day # 28 PCP Yung Vargas MD       Date of Admission: 3/1/2020  3:24 PM kg)  02/06/20 : 186 lb 11.7 oz (84.7 kg)       I/O last 3 completed shifts: In: 3139 [I.V.:1756; IV PIGGYBACK:100]  Out: 9122 [Urine:2460; Drains:372]  I/O this shift:  In: -   Out: 230 [Urine:230]     O2: BIPAP 12/5 30%  General: NAD.    Neuro: Alert, no foot/ankle w/ increased pain Sunday  -PVS aware- no interventions planned for now  9.  CAD/recent STEMI - sinus tach now- possibly due to pain and increased WOB  -no DVT on LE venous dopplers  -AC per cards- restarted cangrelor 3/20, on ASA/plavix  -cards s

## 2020-03-29 NOTE — PROGRESS NOTES
03/29/20 0535   BiPAP   Device V60   BiPAP / CPAP CE# [id number]   Mode AVAPS   Interface Full face mask   Mask Size Large   Control Settings   Oxygen Percent 30 %   Inspiratory time 1   Insp rise time 3   AVAPS   Min IPAP 10   Max IPAP 20   EPAP Level 5   Se

## 2020-03-29 NOTE — PROGRESS NOTES
[facility] Hospitalist Progress Note                                                                   [address] [facility]  5/25/1945    SUBJECTIVE:  Pt seen and examined.   Responding to commands on B • pantoprazole (PROTONIX) IV push  40 mg Intravenous Q12H   • Metoclopramide HCl  5 mg Intravenous Q6H   • magnesium oxide  400 mg Oral TID CC   • gabapentin  400 mg Oral Nightly     Continuous Infusions:   • adult 3 in 1 TPN 57.3 mL/hr at 03/29/20 0400 hematoma. Hgb stable from yesterday. Apprec Dr. Joe Heaton review.    [facility]    # SOB/hypoxia   -chest XR with possible PNA, on abx and ID following  -possible fluid overload - s/p IV lasix  -atelectasis from immobility/pain - ISS   -bipap, stenosis, medical management     # Severe Left ICA stenosis  - as above, medical management  - per daughter is sensitive to drops in BP, does better with SBPs closer to 130 (goal now 125 per discussion with neuro)     # Hx lip swelling/?angioedema  - zyrte

## 2020-03-29 NOTE — PROGRESS NOTES
[facility]  Cardiology Progress Note/Reconsult    Subjective:  Mrs. Medhat Smiley was transferred from ICU yesterday. Cardiology has been asked to re-evaluate her due to the inability to swallow her Plavix.   In brief, she has been hospitalized nearly 1 mon Pulse 70   Temp 97.8 °F (36.6 °C)   Resp (!) 32   Ht 5' 4.02\" (1.626 m)   Wt 179 lb 3.7 oz (81.3 kg)   SpO2 97%   BMI 30.75 kg/m²     Lab Results   Component Value Date    WBC 19.6 03/29/2020    HGB 8.2 03/29/2020    HCT 25.9 03/29/2020    .0 03/29 transfusion 3/19/20 rec'd 1 unit for Hgb 8.7. · Possible Pneumonia - pulmonary following on meropenem, remains on BiPaP. · Anasarca secondary to hypoalbuminemia - nephrology following for diuretics as needed.     · Acute cholecystitis s/p monica tube 3/18 needed.    · Continue ginger De La O MD  [facility]  Cardiac Electrophysiolgy

## 2020-03-29 NOTE — DIETARY NOTE
Clinical Nutrition- Parenteral Nutrition  Next TPN (Bag #6) to start tonight and will provide: 400 dextrose calories, 420 lipid calories, 35% lipids, 95 grams protein, and 1200 calories (~80% of kcals and 100% of protein goal) in 1375 ml fluid.  GIR: 1.00 m

## 2020-03-30 NOTE — DIETARY NOTE
Clinical Nutrition- Parenteral Nutrition  Next TPN (Bag #7) to start tonight and will provide: 515 dextrose calories, 390 lipid calories, 30% lipids, 95 grams protein, and 1295 calories (~85% of kcals and 100% of protein goal) in 1404 ml fluid.  GIR: 1.34 m

## 2020-03-30 NOTE — IMAGING NOTE
70 yo with pelvic drain for post op vag hysterctomy fluid collection and percutaneous cholecystostomy for acute cholecystitis. 95cc output from perc monica.   CPM.  Patient will need this left in for a minimum of 6 weeks from date of placement or until

## 2020-03-30 NOTE — PROGRESS NOTES
INFECTIOUS DISEASE PROGRESS NOTE    Valeria Xochitl Patient Status:  Inpatient    1945 MRN [id number]   [facility] 3NE-A Attending Sigifredo Watters MD   [facility] Day # 34 PCP Austin Lee infusion, 0.2-1.5 mcg/kg/hr (Dosing Weight), Intravenous, Continuous  •  norepinephrine (LEVOPHED) 4 mg/250 ml premix infusion, 0.5-30 mcg/min, Intravenous, Continuous  •  Pantoprazole Sodium (PROTONIX) 40 mg in Sodium Chloride 0.9 % 10 mL IV push, 40 mg, injection. Dry mucous membranes. Neck: No lymphadenopathy. Supple. Respiratory: Lungs clear bilaterally. No wheezes. Decreased at bases bilaterally. Cardiovascular: S1, S2. Tachycardic. Regular rhythm. +TIA.  Pacemaker pocket w/o signs of infection  Abd Status: None    Collection Time: 03/20/20  3:20 PM   Result Value Ref Range    Blood Culture Result No Growth 5 Days N/A   2.  BODY FLUID CULT,AEROBIC AND ANAEROBIC     Status: None    Collection Time: 03/18/20  1:29 PM   Result Value Ref Range    Body Flui change in dense consolidation at the left base/retrocardiac region. 3. No new abnormality. Continued clinical correlation and followup recommended.     Dictated by: Clover Carvalho MD on 3/30/2020 at 8:31 AM   Finalized by: Clover Carvalho MD on 3/30/2020 at 8:34 cultures (except for two contaminated cultures). · Continue to follow temperatures and WBCs  · Monitor creatinine  · Monitor hgb  · Patient now DNR/DNI; poor prognosis  D/w patient and RN. Alexandra Guy PA-C    ID ATTENDING ADDENDUM     Pt see

## 2020-03-30 NOTE — SLP NOTE
Attempted to see pt for swallow evaluation given RN reported pt off AVAPS and alert. Upon entering the room with increased WOB, pt tachypneic with RR ranging from 35-42. Moreover, pt demonstrated times of prolonged breath holding.  Given respiratory status,

## 2020-03-30 NOTE — PLAN OF CARE
Patient is alert and oriented. On and off 4L NC and AVAPS. NSR/ST on the tele. Family updated with POC multiple times. Patient denies any pain, just having a hard time breathing. Patient stated she was done with everything, but she was scarred.  RN provided

## 2020-03-30 NOTE — PROGRESS NOTES
[address] [facility] Patient Status:  Inpatient    1945 MRN [id number]   [facility] 6NE-A Attending Lauren Sorensen MD   [facility] Day # 34 PCP Sarah Lemus MD     Critical Care Progress Note     Date of Admission: 3/1/2020  3: mg in sodium chloride 0.9% 100 mL MBP, 500 mg, Intravenous, Q12H  •  norepinephrine (LEVOPHED) 4 mg/250 ml premix infusion, 0.5-30 mcg/min, Intravenous, Continuous  •  Pantoprazole Sodium (PROTONIX) 40 mg in Sodium Chloride 0.9 % 10 mL IV push, 40 mg, Intr at 3/30/2020 0555  Gross per 24 hour   Intake 2347 ml   Output 1463 ml   Net 884 ml      Physical Exam:                          General: alert, cooperative, in NAD                          HEENT: oropharynx clear without erythema or exudates, dry mucous m surgery signed off, plan for cholangiogram in 8 weeks  - cont with abx, per ID  6. Abscess vs hematoma vs seroma  -s/p drainage cath placed by IR  -Will perform CT abscessogram prior to removal of drain  7.  Pelvic mass  -s/p RADHA-BSO 2/4 complicated by vagi

## 2020-03-30 NOTE — PROGRESS NOTES
[facility] Hospitalist Progress Note                                                                   [address] [facility]  5/25/1945    SUBJECTIVE:  Pt seen and examined.   Weaned off BiPAP, now on 4 10 mg Oral TID   • meropenem  500 mg Intravenous Q12H   • pantoprazole (PROTONIX) IV push  40 mg Intravenous Q12H   • Metoclopramide HCl  5 mg Intravenous Q6H   • magnesium oxide  400 mg Oral TID CC   • gabapentin  400 mg Oral Nightly     Continuous Infusi per RN. Follow CBC  - repeat CT with increase in hematoma. Hgb stable from yesterday. Apprec Dr. Stephanie Casarez review.    - apprec hematology review    # SOB/hypoxia   -chest XR with possible PNA, on abx and ID following  -possible fluid overload - s/p IV lasix sided hemiparesis  - neuro and neuro IR saw last admit -- does have severe left ICA stenosis, medical management     # Severe Left ICA stenosis  - as above, medical management  - per daughter is sensitive to drops in BP, does better with SBPs closer to 130

## 2020-03-30 NOTE — PROGRESS NOTES
Valeria Leung is a 69year old female. Patient presents with:  Abnormal Result    HPI:    Neurology fu note    No neuro events overnight      Pt asking to have the AVAPS off.           Past Medical History:   Diagnosis Date   • Arrhythmia    • Back probl INDICATIONS:  evaluate for stroke     TECHNIQUE:  MRI of the brain was performed with multi-planar T1, T2-weighted images with FLAIR sequences and diffusion weighted images without infusion.      PATIENT STATED HISTORY: (As transcribed by Technologist)

## 2020-03-30 NOTE — PROGRESS NOTES
[facility]  Report of Psychiatric Progress Note    Laura Tolentino Patient Status:  Inpatient    1945 MRN [id number]   [facility] 4SW-A Attending Wai Perry MD   [facility] Day # 34 PCP Bipin Martinez MD     Date of Admission: 3/1/20 of a hematoma. On 3/10/20, she had a CT suggesting a dilated gallbladder with hyperdense material/stones and a complex fluid collection in pelvis. She declined a HIDA scan and refused IR drainage of fluid collection.  She initially refused to get a repeat C the 2nd one because she was tired of testing, but changed her mind and had it today. She is able to communicate to me that she wants all treatments and wants to be full code.  From my conversation with nurses and staff on 3-CTU, she was often rude and deman slept last night with the Xanax 0.5mg dose. She feels depressed and helpless. She is full code, yet tells me and RN that she doesn't want to be intubated or be placed on a ventilator if needed. 3/28/20- She feels anxious and SOB and exhausted.  She want smoked. She has never used smokeless tobacco. She reports current alcohol use. She reports that she does not use drugs.     Allergies:    Baclofen                OTHER (SEE COMMENTS)    Comment:weakness  Radiology Contrast *    OTHER (SEE COMMENTS)    Comme Intravenous, Continuous  •  Pantoprazole Sodium (PROTONIX) 40 mg in Sodium Chloride 0.9 % 10 mL IV push, 40 mg, Intravenous, Q12H  •  Metoclopramide HCl (REGLAN) injection 5 mg, 5 mg, Intravenous, Q6H  •  Acetaminophen-Codeine #3 (TYLENOL #3) 300-30 MG tab logical  Thought content: no delusions  Perceptions: no hallucinations  Associations: Intact    Orientation: Alert, oriented person, place and oriented situation  Attention and Concentration: fair  Memory:  intact recent and intact remote  Language: Intact pole left kidney unchanged. Minimal perinephric stranding bilaterally. No nephrolithiasis or hydronephrosis. ADRENALS:  No mass or enlargement. AORTA/VASCULAR:    Unremarkable as seen on non-contrast imaging.    RETROPERITONEUM:  No mass or adenopathy implantable recording device in the left chest wall. CONCLUSION:    1. There is cholelithiasis with interval distention of the gallbladder with wall thickening and some surrounding pericholecystic fluid suggesting acute cholecystitis.   2. There is an e unremarkable. The expected major intracranial flow voids are present.     =====  CONCLUSION:  Scattered acute infarcts throughout the left MCA territory along with the left occipital lobe near the left PCA territory are again identified.

## 2020-03-30 NOTE — PROGRESS NOTES
03/30/20 0951   Clinical Encounter Type   Referral To Nurse  ( scanned signed POLST form, dated 3/27/2020, into patient's electronic resuscitation wishes/POLST record. )

## 2020-03-30 NOTE — PROGRESS NOTES
[facility]    Progress Note    Nemo Manriquez Patient Status:  Inpatient    1945 MRN [id number]   [facility] 3NE-A Attending Haseeb Maguire, [facility] Day # 34 PCP Jennifer Dave MD       Subjective:     Feeling better today.      Ob the emergency room on March 1 with anemia.     ROLANDO -baseline serum creatinine 1.0 -1.2 mg/dL  -- Acute rise in serum creatinine on 3/11; improved with IV fluid. -- Repeat ROLANDO due to relative hypotension.  Cr improved off lasix  -- Continue with supportiv

## 2020-03-30 NOTE — PROGRESS NOTES
Alert and appropriate yet speech slurred and hard to completely understand.     Denies abdominal pain or left foot pain - frustrated about not eating    Breathing seems labored to me although she is not in any obvious distress    Afebrile  122/69 86 regular

## 2020-03-30 NOTE — PROGRESS NOTES
03/30/20 0407   BiPAP   $ RT Standby Charge (per 15 min) 1   Device V60   BiPAP / CPAP CE# 6053   Mode AVAPS   Interface Full face mask   Mask Size Large   Control Settings   Oxygen Percent 30 %   Inspiratory time 1   Insp rise time 3   AVAPS   Min IPAP

## 2020-03-30 NOTE — PLAN OF CARE
Patient care assumed 1900 on nasal cannula, TPN, cangrelor, precedex, merrem. Mcclellan in place. AVAPS 30% @ noc. NSR to ST on monitor, SBP >120 throughout night w/o Levo assist. BG high despite insulin. Orientedx3 not situation.  Pedal pulses via doppler, LLE

## 2020-03-30 NOTE — SLP NOTE
Attempted to see pt for speech therapy services- pt not appropriate given increased oxygen requirements on AVAPS. Will follow up as scheduling permits. RN aware. Thank you.     Zoey Mejia M.S. [address] [facility]  Pager [phone]

## 2020-03-30 NOTE — PROGRESS NOTES
[address] [facility] [address] Follow Up     Laura Tolentino  [id number]  Hospital Day #29  Date of Consult: 03/27/20  Patient seen at: [facility]     Subjective:      Patient was seen and examined without family at the bedside.    Her ox Aaron Torres also had a journal with a krish cover that she wanted her daughters to read, Thomas Bedolla was made aware.        Review of Systems:      Palliative Care symptom needs assessed:   Symptoms(s): dyspnea  Dyspnea: present      Allergies:   Baclofen Intravenous, Q12H  •  norepinephrine (LEVOPHED) 4 mg/250 ml premix infusion, 0.5-30 mcg/min, Intravenous, Continuous  •  Pantoprazole Sodium (PROTONIX) 40 mg in Sodium Chloride 0.9 % 10 mL IV push, 40 mg, Intravenous, Q12H  •  Metoclopramide HCl (REGLAN) i  (H) 03/30/2020    CO2 20.0 (L) 03/30/2020     (H) 03/30/2020    CA 8.5 03/30/2020    ALB 2.1 (L) 03/30/2020    ALKPHO 175 (H) 03/27/2020    BILT 0.9 03/27/2020    TP 6.5 03/27/2020    AST 44 (H) 03/27/2020    ALT 26 03/27/2020    MG 2.8 (H) 0 Palliative Care Assessment     Goals of Care: Balaji Murphy is feeling anxious this morning with dyspnea. Clinically her family feels she has improved since Friday and if her anxiety was managed it would help her overall improvement.  Her alertness is improved, direct face to face contact,  >50% was spent counseling and coordinating care. We will continue to follow.     [address]  Phone [phone]  3/30/2020  9:54 AM

## 2020-03-31 NOTE — PROGRESS NOTES
[address] [facility] [address] Follow Up     Kev Fritz  [id number]  Hospital Day #30  Date of Consult: 03/27/20  Patient seen at: [facility]     Subjective:      Patient was seen at the bedside without family.    Balaji Murphy communicates w ALPRAZolam (XANAX) tab 0.5 mg, 0.5 mg, Oral, Nightly PRN  •  dextrose 10 % infusion, , Intravenous, Continuous PRN  •  Normal Saline Flush 0.9 % injection 10 mL, 10 mL, Intravenous, PRN  •  Zolpidem Tartrate (AMBIEN) tab 2.5 mg, 2.5 mg, Oral, Nightly PRN glucose (DEX4) oral liquid 30 g, 30 g, Oral, Q15 Min PRN **OR** Glucose-Vitamin C (DEX-4) chewable tab 8 tablet, 8 tablet, Oral, Q15 Min PRN  No current outpatient medications on file.       Labs/ imaging     Hematology:  Lab Results   Component Value Date or  Reduced Full   70 Reduced Some disease  Can’t perform job Full Normal or   Reduced Full   60 Reduced Significant disease  Can’t perform hobby Occasional  Assist Normal or   Reduced Full or confused   50 Mainly sit/lie Extensive Disease  Can’t do any wo scheduling the Ativan so her mother does not have to let her anxiety escalate prior to having medication. I again reminded Vin Jennifer her symptoms of dyspnea and anxiety are difficulty to manage in a treatment focused care.     Vin Luisdamien requested her sons the opportuni ongoing goals of care. - Discussed today’s visit with Dr. Susie Bauer and Rain Amato RN      A total of 60 minutes were spent on this consult; which included all of the following: direct face to face contact, >50% was spent counseling and coordinating care.   Freddie macias

## 2020-03-31 NOTE — PLAN OF CARE
Received pt. On 4L NC. Having periods of apnea and [employer] breathing. Placed on AVAPS . Lungs diminished. Has denied pain. Good urine ouput. Neuro checks unchanged. Speech remains slurred. Dr. Francoise Russo spoke with daughter and orders received.   Did

## 2020-03-31 NOTE — PLAN OF CARE
Patient is alert and oriented. NSR/ST on the tele. Pain in left leg but refuses pain medications. chennyes-kennedy breathing at times. Patient is refusing AVAPs, repositioning and some treatments.  Patient is ready to transition to comfort care, awaiting pat

## 2020-03-31 NOTE — PROGRESS NOTES
INFECTIOUS DISEASE PROGRESS NOTE    Juan F Sutton Patient Status:  Inpatient    1945 MRN [id number]   [facility] 3NE-A Attending Mariah Camarillo MD   [address] Day # 30 TERRELL Cardoza Continuous  •  norepinephrine (LEVOPHED) 4 mg/250 ml premix infusion, 0.5-30 mcg/min, Intravenous, Continuous  •  Pantoprazole Sodium (PROTONIX) 40 mg in Sodium Chloride 0.9 % 10 mL IV push, 40 mg, Intravenous, Q12H  •  Metoclopramide HCl (REGLAN) injectio membranes. Neck: No lymphadenopathy. Supple. Respiratory: Lungs clear bilaterally. No wheezes. Decreased at bases bilaterally. Cardiovascular: S1, S2. Tachycardic. Regular rhythm. +TIA.  Pacemaker pocket w/o signs of infection  Abdomen: Soft, non-tender WBCUR, RBCUR, Buddie Bowels in the last 168 hours. Microbiology  Reviewed in EMR,   Hospital Encounter on 03/01/20   1.  BLOOD CULTURE     Status: None    Collection Time: 03/20/20  3:20 PM   Result Value Ref Range    Blood Culture Result No Growth 5 Days days. Abnormal chest CT 3/23 with moderate bilateral pleural effusions with compressive atelectasis/consolidation. 5. Acute resp failure- assume due to above + a component of fluid OL; on nasal cannula. 6.  PAD- changes to L foot seem worse but no signs o

## 2020-03-31 NOTE — CM/SW NOTE
MSW reviewed chart and spoke with RN. The patient has been having [address] conversations with MD's along with daughters.   Per RN, the way conversations are going, it sounds as if the patient will transition to comfort measures once they get all the daughters o

## 2020-03-31 NOTE — DIETARY NOTE
NUTRITION ASSESSMENT    Pt is at high nutrition risk. Pt does not meet malnutrition criteria.     NUTRITION DIAGNOSIS/PROBLEM:  Inadequate oral intake related to inability to consume adequate energy as evidenced by the need for nutrition support. -ongoing lb)  06/25/19 : 72.6 kg (160 lb)    NUTRITION:  Diet: NPO  Oral Supplements: none    FOOD/NUTRITION RELATED HISTORY:  Appetite: Poor  Intake:minimal  Intake Meeting Needs: TPN meeting needs  Food Allergies: No  Cultural/Ethnic/Taoist Preferences Address

## 2020-03-31 NOTE — PROGRESS NOTES
No major issues overnight - reviewed with nursing staff and respiratory therapy at bedside.     On AVAPS overnight    Low dose levophed restarted for asymptomatic BP below 120    Afebrile  62 regular  128/56   RR 30    Lungs no wheezing  Ht RRR  abd soft -

## 2020-03-31 NOTE — PROGRESS NOTES
Manisha Montez is a 69year old female.    Patient presents with:  Abnormal Result    HPI:    Neurology fu note    No neuro events overnight      Pt awake and very anxious        Past Medical History:   Diagnosis Date   • Arrhythmia    • Back problem    • C MRI of the brain was performed with multi-planar T1, T2-weighted images with FLAIR sequences and diffusion weighted images without infusion.      PATIENT STATED HISTORY: (As transcribed by Technologist)        FINDINGS:       Prominence of the sulci is note

## 2020-03-31 NOTE — PROGRESS NOTES
[facility]    Nephrology Progress Note    Minus Travis Attending:  Elbert Shoemaker MD       SUBJECTIVE:     C/o leg pain but otherwise no complaints    PHYSICAL EXAM:     Vital Signs: /81 (BP Location: Right arm)   Pulse 103   Temp 98.3 °F (36. EOPERCENT 7.2 03/30/2020    BAPERCENT 0.6 03/30/2020    NE 10.22 (H) 03/30/2020    LYMABS 1.56 03/30/2020    MOABSO 0.92 03/30/2020    EOABSO 1.00 (H) 03/30/2020    BAABSO 0.09 03/30/2020     No results found for: Revonda Kris, CREAURINE, Ghazala  NaCl (CARDENE) 20 mg/200 ml premix infusion, 5-15 mg/hr, Intravenous, Continuous  ondansetron HCl (ZOFRAN) injection 4 mg, 4 mg, Intravenous, Q6H PRN  Dexmedetomidine HCl in NaCl (PRECEDEX) 400 MCG/100ML premix infusion, 0.2-1.5 mcg/kg/hr (Dosing Weight), vein occlusion, she also has a history of type 2 diabetes and chronic kidney disease who presented to the emergency room on March 1 with anemia.     ROLANDO -baseline serum creatinine 1.0 -1.2 mg/dL  -- Acute rise in serum creatinine on 3/11; improved with IV

## 2020-03-31 NOTE — SLP NOTE
Attempted to see pt for speech therapy evaluation- RN reported pt with increased oxygen requirements and not appropriate for participation. Will follow up in 1-2 days pending scheduling. RN aware. Thank you.     Zo Ac M.S. [id number] [facility]  Pager [phone]

## 2020-03-31 NOTE — PROGRESS NOTES
Per RN patient having irregular breathing patterns. Patient placed on AVAPs for the night as documented.         03/30/20 2330   BiPAP   $ RT Standby Charge (per 15 min) 1   Device V60   BiPAP / CPAP CE# 6006   Mode AVAPS   Interface Full face mask   Mask S

## 2020-03-31 NOTE — PROGRESS NOTES
[address] [facility] Patient Status:  Inpatient    1945 MRN [id number]   [facility] 6NE-A Attending Dorian Angulo MD   [facility] Day # 27 PCP Yung Vargas MD     Critical Care Progress Note     Date of Admission: 3/1/2020  3: NaCl (PRECEDEX) 400 MCG/100ML premix infusion, 0.2-1.5 mcg/kg/hr (Dosing Weight), Intravenous, Continuous  •  norepinephrine (LEVOPHED) 4 mg/250 ml premix infusion, 0.5-30 mcg/min, Intravenous, Continuous  •  Pantoprazole Sodium (PROTONIX) 40 mg in Sodium PIGGYBACK:100]  Out: 8104 [Urine:5250; Drains:470]  I/O this shift:  In: 3189.8 [I.V.:1668.6; IV PIGGYBACK:100]  Out: 4160 [Urine:2900; Drains:112]     General appearance: alert, appears stated age, slowed mentation and anxious  Lungs: diminished BS, no wh intervention needed  8. PAD  -left foot/ankle w/ increased pain Sunday  -PVS aware- no interventions planned for now  9. CAD/recent STEMI -  -no DVT on LE venous dopplers  -AC per cards- restarted cangrelor 3/20, on ASA/plavix  -cards signed off  10.  ROLANDO

## 2020-03-31 NOTE — PROGRESS NOTES
[facility]  Report of Psychiatric Progress Note    Hasmukh Pat Patient Status:  Inpatient    1945 MRN [id number]   [facility] 4SW-A Attending Surjit Longo MD   [facility] Day # 27 PCP Clarance Halsted, MD     Date of Admission: 3/1/20 HIDA scan and refused IR drainage of fluid collection.  She initially refused to get a repeat CT Abd/Pelvis, but agreed today and it showed \"an enlarging fluid collection with infiltration of the surrounding fat which contains a few air bubbles (less than full code. From my conversation with nurses and staff on 3-CTU, she was often rude and demanding and wanted to be called \"Dr. Johnnie Morrissey". She minimizes her psych symptoms and DENIES feeling depressed (but appears depressed).  She endorses high anxiety about placed on a ventilator if needed. 3/30/20- She feels anxious and SOB and exhausted. She wants something to help with her symptoms. She is open to Ativan IV prn. She doesn't want morphine prn because she thinks she had side effects with it before.  Per d smokeless tobacco. She reports current alcohol use. She reports that she does not use drugs. Allergies:    Baclofen                OTHER (SEE COMMENTS)    Comment:weakness  Radiology Contrast *    OTHER (SEE COMMENTS)    Comment:Lower lip swelling.  Cons mg, Intravenous, Q12H  •  Metoclopramide HCl (REGLAN) injection 5 mg, 5 mg, Intravenous, Q6H  •  Acetaminophen-Codeine #3 (TYLENOL #3) 300-30 MG tab 1 tablet, 1 tablet, Oral, Q4H PRN  •  Prochlorperazine Edisylate (COMPAZINE) injection 10 mg, 10 mg, Ashia Sanchez situation  Attention and Concentration: distracted  Memory:  intact recent and intact remote  Language: unable to test naming     Insight: fair  Judgment: fair    Laboratory Data:  Lab Results   Component Value Date    WBC 11.6 03/31/2020    HGB 8.2 03/31/ AORTA/VASCULAR:    Unremarkable as seen on non-contrast imaging. RETROPERITONEUM:  No mass or adenopathy. BOWEL/MESENTERY:  No visible mass, obstruction, or bowel wall thickening. Trace amount of fluid in the pericolic gutters bilaterally.   No melquiades wall thickening and some surrounding pericholecystic fluid suggesting acute cholecystitis.   2. There is an enlarging fluid collection with infiltration of the surrounding fat which contains a few air bubbles (less than previous) which is contiguous with th territory along with the left occipital lobe near the left PCA territory are again identified.

## 2020-04-01 NOTE — PROGRESS NOTES
[facility]  Report of Psychiatric Progress Note    Minus Travis Patient Status:  Inpatient    1945 MRN [id number]   [facility] 4SW-A Attending Gino Saenz MD   [address] Day # 32 PCP Ahsan Albrecht MD     Date of Admission: 3/1/20 secondary to left popliteal artery occlusion. On 3/4/20, she had repair of vaginal dehiscence and evaluation of a hematoma. On 3/10/20, she had a CT suggesting a dilated gallbladder with hyperdense material/stones and a complex fluid collection in pelvis. allergic. She now tell me that she agrees to the study. Regarding the repeat CT Abd/Pelvis, she initially refused the 2nd one because she was tired of testing, but changed her mind and had it today.  She is able to communicate to me that she wants all treat some feelings of helplessness. 3/27/20- She is tachypnic and SOB and anxious this AM. She is on precedex. She slept last night with the Xanax 0.5mg dose. She feels depressed and helpless.  She is full code, yet tells me and RN that she doesn't want to b • CARDIAC PACEMAKER PLACEMENT     • CATARACT      left eye   • HYSTERECTOMY     • OTHER      Pacemaker   • VAGINAL HYSTERECTOMY WITH BILATERAL [address] N/A 3/4/2020    Performed by Petra Wyatt MD at [facility]   • XI ROBOT-ASSISTED LAPAROSCOPIC niCARdipine HCl in NaCl (CARDENE) 20 mg/200 ml premix infusion, 5-15 mg/hr, Intravenous, Continuous  •  ondansetron HCl (ZOFRAN) injection 4 mg, 4 mg, Intravenous, Q6H PRN  •  Dexmedetomidine HCl in NaCl (PRECEDEX) 400 MCG/100ML premix infusion, 0.2-1.5 mc nervous/anxious.       Mental Status Exam:     Objective       04/01/20  1346   BP: 117/61   Pulse: 68   Resp: (!) 42   Temp:      Appearance: fair grooming, abdominal breathing   Behavior: awakes to voice, fair eye contact  Gait: not observed    Speech: mu stranding in the gallbladder fossa suggesting acute cholecystitis. There is some mass effect upon the adjacent duodenum.   PANCREAS:  Moderate atrophy without focal mass without contrast.  SPLEEN:  Unremarkable appearance without contrast.  KIDNEYS:  Sma more prominent than on the previous study. Pacemaker leads are noted. There is cardiomegaly. There is coronary artery   calcification. There is calcification of the mitral valve annulus. Trace pericardial thickening unchanged.   OTHER:  There is partia frontal, left parietal along with left occipital lobe is noted. Bony exostosis along the left parietal/temporal bone is suggested. Smaller FLAIR abnormalities scattered in the white matter are noted.      The visualized paranasal sinuses and mas

## 2020-04-01 NOTE — PROGRESS NOTES
[address] [facility] [address] Follow Up     Almita Parra  [id number]  Hospital Day #31  Date of Consult: 03/27/20  Patient seen at: [facility]     Subjective:      Patient was seen without family at the bedside.    Rosemarie's speech was g mg, 10 mg, Oral, TID  •  dextrose 10 % infusion, , Intravenous, Continuous PRN  •  Normal Saline Flush 0.9 % injection 10 mL, 10 mL, Intravenous, PRN  •  Zolpidem Tartrate (AMBIEN) tab 2.5 mg, 2.5 mg, Oral, Nightly PRN  •  diphenhydrAMINE (BENADRYL) cap/ta Oral, Q15 Min PRN **OR** Glucose-Vitamin C (DEX-4) chewable tab 8 tablet, 8 tablet, Oral, Q15 Min PRN  No current outpatient medications on file.       Labs/ imaging     Hematology:  Lab Results   Component Value Date    WBC 10.8 04/01/2020    HGB 7.4 (L) 0 Reduced Significant disease  Can’t perform hobby Occasional  Assist Normal or   Reduced Full or confused   50 Mainly sit/lie Extensive Disease  Can’t do any work   Partial Assist Normal or Reduced Full or confused   40 Mainly in bed Extensive Disease  Can’ verbalized understanding. When Tom Irwin was present we discussed the discontinuation of current medications, blood draws and TPN . The focus is for Brijesh Bishop to feel like herself without IV lines, cables and further testing.  Celia shared with her mother the focus Presence of drug coated stent in right coronary artery    Goals of care     Palliative care        Palliative Care Recommendations/Plan:     1.  Ongoing goals of care: Leobardo Naqvi and daughter Dr. Oma Kim were both medically updated on Rosemarie's prognosis this

## 2020-04-01 NOTE — PROGRESS NOTES
[facility] Hospitalist Progress Note                                                                   [address] [facility]  5/25/1945    SUBJECTIVE:  Pt moaning intermittently.  On low dose of pressors So far Jossue's labs are looking great. His hemoglobin is now up to a normal range at 11.6. No other concerns are noted. Intravenous Q6H   • cetirizine  5 mg Oral Daily   • diphenhydrAMINE HCl  12.5 mg Intravenous Q8H   • Midodrine HCl  10 mg Oral TID   • pantoprazole (PROTONIX) IV push  40 mg Intravenous Q12H   • Metoclopramide HCl  5 mg Intravenous Q6H   • magnesium oxide repaired, blood clots evacuated. - seen by gynecology (see Dr. Gamal Farrell note) no plans for intervention  - Hg now stable. Very minimal spotting per RN.   Follow CBC  - apprec hematology review    # SOB/hypoxia   -chest XR with possible PNA, on abx and ID and neuro IR saw last admit -- does have severe left ICA stenosis, medical management     # Severe Left ICA stenosis  - as above, medical management  - per daughter is sensitive to drops in BP, does better with SBPs closer to 130 (goal now 120 per discussi

## 2020-04-01 NOTE — PROGRESS NOTES
Moaning this am - calms down when I speak with her - wants hot tea - frustrated regarding limitations    Breathing easier than the past two mornings despite refusing pressure support ventilation overnight    Afebrile 165/79 at present - off vasopressor sup

## 2020-04-01 NOTE — PLAN OF CARE
Assumed patient care at [address] [place] [address]. SR 70's-90's. -130 SBP. Pt anxious with chyene-kennedy breaths. Oriented to self, mostly drowsy with incoherent speech. Refused Bi-pap mask to be placed, on NC. Precedex gtt and scheduled ativan.  NPO-failed swallow evalua

## 2020-04-01 NOTE — PROGRESS NOTES
Neuro fu    Pt currently being repositioned/cleaned  Pt heard moaning every time she is attended to, due to anxiety  Psych note reviewed, agree with scheduled meds for anxiety  Pt has labored breathing with anxiety.  Anxiety not yet better this am  Per des

## 2020-04-01 NOTE — CM/SW NOTE
Per palliative notes, patient to be transitioned to comfort measures.     Tani Rocio BOGGS [facility][1]  [phone]

## 2020-04-01 NOTE — PROGRESS NOTES
Critical Care Progress Note        NAME: Gus Cardenas - ROOM: [facility]1249-I - MRN: [id number] - Age: 69year old - : 1945  Date of Admission: 3/1/2020  3:24 PM  Admission Diagnosis: Vaginal bleeding [N93.9]  Symptomatic anemia [D64.9]    Last 24hrs: dexmedetomidine 0.7 mcg/kg/hr (04/01/20 0600)   • norepinephrine Stopped (03/31/20 0715)     PRN Medication:Labetalol HCl, dextrose, Normal Saline Flush, zolpidem, diphenhydrAMINE HCl, ondansetron HCl, Acetaminophen-Codeine #3, Prochlorperazine Edisylate, AMS  -facial droop has improved  -no further interventions for now  -neuro following  12. Anasarca- cont with diuresis/albumin prn  13. FEN:  -unwilling to eat.  On TPN, lytes prn  14. Proph  -SCDs  15.  Dispo:  -[address]  -talked with da

## 2020-04-01 NOTE — PROGRESS NOTES
INFECTIOUS DISEASE PROGRESS NOTE    Juan M Stewart Patient Status:  Inpatient    1945 MRN [id number]   [facility] 3NE-A Attending Lion Mckinley MD   [facility] Day # 32 PCP Kamila Roberson mcg/kg/hr (Dosing Weight), Intravenous, Continuous  •  norepinephrine (LEVOPHED) 4 mg/250 ml premix infusion, 0.5-30 mcg/min, Intravenous, Continuous  •  Pantoprazole Sodium (PROTONIX) 40 mg in Sodium Chloride 0.9 % 10 mL IV push, 40 mg, Intravenous, Q12H lymphadenopathy. Supple. Respiratory: Lungs clear bilaterally. No wheezes. Decreased at bases bilaterally. Accessory muscle use noted. Cardiovascular: S1, S2. Regular rate. Regular rhythm. +TIA.  Pacemaker pocket w/o signs of infection  Abdomen: Soft, no Micah Umaña, [address], [address], NITRITE, LEUUR, WBCUR, RBCUR, BACUR, EPIUR in the last 168 hours. Microbiology  Reviewed in EMR,   Hospital Encounter on 03/01/20   1.  BLOOD CULTURE     Status: None    Collection Time: 03/20/20  3:20 PM   Resul no cough and has been on IVABX. Also since pt in bed for days. Abnormal chest CT 3/23 with moderate bilateral pleural effusions with compressive atelectasis/consolidation.   5. Acute resp failure- assume due to above + a component of fluid OL; on nasal victorino

## 2020-04-01 NOTE — PROGRESS NOTES
[facility]    Nephrology Progress Note    Velasquez Urena Attending:  Phuong Reason, DO       SUBJECTIVE:     Pt moaning at time   Endorses thirst but denies pain  Back on low dose norepi    PHYSICAL EXAM:     Vital Signs: /59 (BP Location: Reba Barriga 04/01/2020    MOPERCENT 8.0 04/01/2020    EOPERCENT 16.0 04/01/2020    BAPERCENT 0.5 04/01/2020    NE 6.73 04/01/2020    LYMABS 1.41 04/01/2020    MOABSO 0.87 04/01/2020    EOABSO 1.73 (H) 04/01/2020    BAABSO 0.05 04/01/2020     No results found for: MALB mg, Oral, Q6H PRN  niCARdipine HCl in NaCl (CARDENE) 20 mg/200 ml premix infusion, 5-15 mg/hr, Intravenous, Continuous  ondansetron HCl (ZOFRAN) injection 4 mg, 4 mg, Intravenous, Q6H PRN  Dexmedetomidine HCl in NaCl (PRECEDEX) 400 MCG/100ML premix infusio STEMI/CVA/lower extremity ischemia due to popliteal vein occlusion, she also has a history of type 2 diabetes and chronic kidney disease who presented to the emergency room on March 1 with anemia.     ROLANDO -baseline serum creatinine 1.0 -1.2 mg/dL  -- Acute

## 2020-04-01 NOTE — PLAN OF CARE
Presumed care of pt at 0730. Moans with pain. Pt is now comfort care with dilaudid & ativan ordered PRN (see MAR). Pt on 2L O2 NC. Daughter at bedside.

## 2020-04-01 NOTE — DIETARY NOTE
Clinical Nutrition- Parenteral Nutrition  Next TPN (Bag #9) to start tonight and will provide: 710 dextrose calories, 420 lipid calories,28% lipids, 95 grams protein, and 1510 calories (100% of calorie & protein goal) in 1515 ml fluid. GIR: 1.80 mg/kg/min.

## 2020-04-02 NOTE — PLAN OF CARE
Pt is unresponsive and response to tactile stimuli. Pt is on 2L o2 for comfort. Pt is on comfort care and is on PCA ativan and morphine. Pt is tolerating well pt does have episodes of moaning  Calms down when daughter talks to her.  Breathing is sometimes l

## 2020-04-02 NOTE — PLAN OF CARE
NURSING ADMISSION NOTE      Patient admitted via Cart  Oriented to room. Safety precautions initiated. Bed in low position. Call light in reach.   PT brought over with iv ativan

## 2020-04-02 NOTE — IMAGING NOTE
70 yo F pelvic mass, BSO, pelvic fluid collection and cholecystitis s/p tube placement x 2. Recent transition to palliation. RUQ tube to be left in place for minimum 6 weeks.   Pelvic drain could be removed if causing pain or otherwise desired, if not sym

## 2020-04-02 NOTE — SLP NOTE
Patient transferred to comfort care only. Will discharge from skilled speech/swallowing services.     Von Vega MA, Luisa Melton  Speech Language Pathologist  Pager# [phone]

## 2020-04-02 NOTE — DIETARY NOTE
In Patient Comfort Care Order Set noted. Nutrition services will sign off at this time. Re-consult RD if further nutrition care is needed.     Koby Calderón MS, RD, LDN  Pager [phone]

## 2020-04-02 NOTE — PLAN OF CARE
Assumed patient care at [address]. Patient restless and moaning at times, ativan and dilaudid given. Per  [address] orders to start an Ativan gtt 1mg/hr and give PRN ativan as needed. Mouth swabbed when needed.  Daughter, Zohra Victor at bedside and updated on plan of

## 2020-04-02 NOTE — PROGRESS NOTES
[facility] Hospitalist Progress Note                                                                   [address]  5/25/1945    SUBJECTIVE:  Pt now in comfort care, she has morphine gtt.  She ([facility] [address]    Type 2 diabetes mellitus with complication, without long-term current use of insulin ([facility] [address]    Hyperlipidemia    CAD in native artery    Hyponatremia    Leukocytosis    Acute kidney injury ([address]    Hyperglycemia    Vaginal bleeding    Depressive di

## 2020-04-02 NOTE — PROGRESS NOTES
[facility] PULM/CC    Discussed w/ RN. Patient is now comfort care. We will sign off but please call us if we can be of assistance. Jose Luis Mckeon M.D.   Pulmonary/Critical Care

## 2020-04-02 NOTE — PROGRESS NOTES
Notes reviewed and events of last 24 hours noted. Was transitioned to comfort care only yesterday and all medications were stopped except ativan and diluadid per palliative care team    Will sign off.

## 2020-04-02 NOTE — PROGRESS NOTES
[address] Follow Up     Safia Hussain  [id number]  Hospital Day #32  Date of Consult: 03/27/20  Patient seen at: [facility]:      Patient was seen with Xochitl Morales, her daughter at the bedside.  Ankur Jacobs is non 04/01/2020    .0 04/01/2020       Coags:  Lab Results   Component Value Date    INR 1.17 (H) 03/26/2020    PTT 28.2 03/26/2020     Chemistry:  Lab Results   Component Value Date    CREATSERUM 1.57 (H) 04/01/2020    BUN 82 (H) 04/01/2020     (H Care Minimal Drowsy/confused   10 Bedbound/coma Extensive Disease  Can’t do any work  coma  Max Assist  Total Care Mouth care Drowsy or coma   0 Death     Palliative Care Assessment     Goals of Care: Rosemarie Yang's daughter was present at bedside over night needed. A discussion was had with the daughters on the positive effects of Morphine to relieve air hunger/  dyspnea. They both would like Morphine to assist in her ongoing dyspnea to allow comfort to Shasha Gilmer.      Discontinue dilaudid IVP   Start Morphine 1 mg

## 2020-04-03 NOTE — PROGRESS NOTES
Pt found unresponsive and daughter at bedside pt has no pulse or b/p pt pronounced dead at 05:02 am this am.

## 2020-04-11 NOTE — DISCHARGE SUMMARY
General Medicine Discharge Summary     Patient ID:  Sydney Loredo  69year old  5/25/1945    Admit date: 3/1/2020    Discharge date and time: 4/3/2020  5:02 AM     Attending Physician: Valentin Alicea CONSULT TO INFECTIOUS DISEASE  IP CONSULT TO NEPHROLOGY  IP CONSULT TO GASTROENTEROLOGY  IP CONSULT TO CRITICAL CARE INTENSIVIST  IP CONSULT TO VASCULAR ACCESS TEAM  NURSING CONSULT TO DIETITIAN  IP CONSULT TO FOOD AND NUTRITION SERVICES  IP CONSULT TO VAS needed for Pain., Historical    Omeprazole 40 MG Oral Capsule Delayed Release  Take 40 mg by mouth daily. , Historical    Ondansetron HCl (ZOFRAN) 4 mg tablet  Take 4 mg by mouth daily. , Historical    ondansetron 4 MG Oral Tablet Dispersible  Take 4 mg by m

## 2020-07-21 ENCOUNTER — APPOINTMENT (OUTPATIENT)
Dept: CARDIOLOGY | Age: 75
End: 2020-07-21

## 2021-11-09 NOTE — PROGRESS NOTES
[facility]  Cardiology Progress Note    Kathleen Hay Patient Status:  Inpatient    1945 MRN [id number]   [facility] 7NE-A Attending Izella Boas, MD   [facility] Day # 21 PCP Savannah Lyles MD     Subjective:  Sitting up in the  RADHA/SBP 2/3/20 - fibroma/leiomyoma per pathology.  Tyrel Wong continues   · Iesha op acute MI with both Anterior and Inferior ST elevation s/p PCI/KISHA of the RCA (culprit vessel with collaterals to subtotal LAD  · Iesha op Left occipital, parietal, fron Normal

## 2021-12-03 NOTE — PLAN OF CARE
CARDIOVASCULAR - ADULT    • Maintains optimal cardiac output and hemodynamic stability Progressing        Diabetes/Glucose Control    • Glucose maintained within prescribed range Progressing        GASTROINTESTINAL - ADULT    • Maintains or returns to base 30

## 2021-12-24 NOTE — TELEPHONE ENCOUNTER
From: Abhay Payment  To: Jeanette Huston MD  Sent: 12/13/2019 12:49 PM CST  Subject: Non-Urgent Medical Question    Atul Carpio,   I would like an appointment as soon as possible to have the botox injections for left leg and left arm.    Newest left leg issues:
Response to my chart message sent in a separate message.
FAMILY HISTORY:  No pertinent family history in first degree relatives

## 2022-04-30 NOTE — PROGRESS NOTES
Nausea relieved after Zofran dose, complains that she can not sleep, feels anxious, Ambien 2.5mg PO given, able to convince to put BIPAP while she sleeps. BIPAP well tolerated. BP kept at 000 to 240 systolic.   Small bloody discharge from vagina noted, Hgb no

## 2023-06-20 NOTE — PHYSICAL THERAPY NOTE
PHYSICAL THERAPY TREATMENT NOTE - INPATIENT    Room Number: 5931/6010-Z     Session: 1  Number of Visits to Meet Established Goals: 5    Presenting Problem: acute R MCA    History related to current admission: admitted with weakness and dizziness, L facia Moving to and from a bed to a chair (including a wheelchair)?: Total   -   Need to walk in hospital room?: Total   -   Climbing 3-5 steps with a railing?: Total       AM-PAC Score:  Raw Score: 8   PT Approx Degree of Impairment Score: 86.62%   Standardized on 3/12/2018.   Ongoing 3/14/18 oriented to person, place and time

## 2023-11-12 NOTE — SLP NOTE
SPEECH DAILY NOTE - INPATIENT    ASSESSMENT & PLAN   ASSESSMENT  Pt seen for dysphagia tx to assess tolerance with recommended diet, ensure proper utilization of aspiration precautions and provide pt/family education.  Patient seen with recommended diet of session(s).   Not Addressed   Goal #4 The patient will utilize compensatory strategies as outlined by  BSSE (clinical evaluation) including Slow rate, Small bites, Small sips, No straws, Upright 90 degrees, Liquids via tsp amount only, Supervision with meal Name band;

## 2023-12-25 NOTE — PROGRESS NOTES
Patient refused HIDA scan today. Refusing PO medication at this time. Requesting to eat. hospitalist and  General surgery aware. 1200: patient agreeable to take PO medications. Given IVP zofran 4 mg prior to medication administration.   Regular diet same name as above

## 2024-03-21 NOTE — CONSULTS
[facility]  Neurocritical Care       Name: Kev Fritz  MRN: [id number]  Admission Date/Time: 2/3/2020  2:09 PM  Primary Care Provider:  Debra Borges MD        Reason for Consultation:   Exp Aphasia, s/p Hysterectomy    HPI:   Mario Patient is scheduled for surgery with Dr. Hoyos    Spoke with: patient    Date of Surgery: 4/1/24, 4/8/24, 4/15/24 & 4/22/24    Location: [facility]    Informed patient they will need an adult  yes    Additional comments: Patient is aware of date and time of the procedure.        Lucila Abarca MA on 3/21/2024 at 2:53 PM    disorder [facility])         Date Noted: 04/19/2018      Left hemiparesis ([facility] [address]         Date Noted: 04/19/2018      Hyperlipidemia         Date Noted: 04/19/2018      Non compliance w medication regimen         Date Noted: 04/19/2018      Anxiety disorder      A differently: Take 50,000 Units by mouth once a week. Takes on Friday weekly ), Disp: 12 capsule, Rfl: 0, Taking  Labetalol HCl 100 MG Oral Tab, TAKE 2 TABLETS(200 MG) BY MOUTH EVERY 8 HOURS (Patient taking differently: 2 (two) times daily.  TAKE 2 TABLETS(2 Smokeless tobacco: Never Used    Substance and Sexual Activity      Alcohol use: Yes        Frequency: Monthly or less        Drinks per session: 1 or 2        Binge frequency: Never        Comment: social      Drug use: No    Social History Narrative , Intravenous, Continuous  dextrose 5% infusion, , Intravenous, Continuous  0.9% NaCl infusion, , Intravenous, Continuous  [MAR Hold] acetaminophen (TYLENOL) tab 650 mg, 650 mg, Oral, Q6H PRN  [MAR Hold] amLODIPine Besylate (NORVASC) tab 10 mg, 10 mg, Oral Temporal, resp. rate 15, height 162.6 cm (5' 4\"), SpO2 97 %, not currently breastfeeding. Body mass index is 28.32 kg/m².     Intake/Output:    Intake/Output Summary (Last 24 hours) at 2/4/2020 1510  Last data filed at 2/4/2020 1237  Gross per 24 hour   I by: Bernice Pacheco MD on 2/04/2020 at 14:41     Approved by: Bernice Pacheco MD on 2/04/2020 at 14:43          Ct Stroke Cta Brain/cta Neck (w Iv)(cpt=70496/60872)    Result Date: 2/4/2020  PROCEDURE:  CT STROKE CTA BRAIN/CTA NECK (W IV)(CPT=70496/7 These are likely related to atherosclerotic plaques. Diffuse intimal irregularity with mild stenosis throughout the right M1 segment is noted. The very distal right M1 segment moderate stenosis (image 382) is stable.   Moderate stenosis involving takeoff cavernous segment of the left internal carotid artery is new when compared to prior examination from 2018.   3. Tiny focal outpouching from the supraclinoid right internal carotid artery in a posterior direction measuring 1.7 mm is relatively stable and of 266*   BUN 23*   CREATSERUM 1.18*   GFRAA 53*   GFRNAA 46*   CA 9.0   ALB 3.3*      K 4.1      CO2 23.0   ALKPHO 114   AST 15   ALT 18   BILT 0.6   TP 7.0       Assesment/Plan:   Active Problems:    Pelvic mass    History of stroke    Exp Aphas the patient, family, and clinical staff. Thank you for allowing me to participate in the care of this patient.      Disha Childers MD  Medical Director - Stroke and Neurocritical Care  [facility] - In affiliation with Maliha Dupont

## (undated) DEVICE — PLUMEPORT ACTIV LAPAROSCOPIC SMOKE FILTRATION DEVICE: Brand: PLUMEPORT ACTIVE

## (undated) DEVICE — SOL  .9 1000ML BTL

## (undated) DEVICE — VCARE MEDIUM, UTERINE MANIPULATOR, VAGINAL-CERVICAL-AHLUWALIA'S-RETRACTOR-ELEVATOR: Brand: VCARE

## (undated) DEVICE — MEGA NEEDLE DRIVER: Brand: ENDOWRIST

## (undated) DEVICE — VISUALIZATION SYSTEM: Brand: CLEARIFY

## (undated) DEVICE — ANCHOR TISSUE RETRIEVAL SYSTEM, BAG SIZE 175 ML, PORT SIZE 10 MM: Brand: ANCHOR TISSUE RETRIEVAL SYSTEM

## (undated) DEVICE — TROCAR: Brand: KII FIOS FIRST ENTRY

## (undated) DEVICE — CAUTERY PENCIL

## (undated) DEVICE — ELECTRO LUBE IS A SINGLE PATIENT USE DEVICE THAT IS INTENDED TO BE USED ON ELECTROSURGICAL ELECTRODES TO REDUCE STICKING.: Brand: KEY SURGICAL ELECTRO LUBE

## (undated) DEVICE — UNDYED BRAIDED (POLYGLACTIN 910), SYNTHETIC ABSORBABLE SUTURE: Brand: COATED VICRYL

## (undated) DEVICE — TIP COVER ACCESSORY

## (undated) DEVICE — KENDALL SCD EXPRESS SLEEVES, KNEE LENGTH, MEDIUM: Brand: KENDALL SCD

## (undated) DEVICE — BLADELESS OBTURATOR: Brand: WECK VISTA

## (undated) DEVICE — COLUMN DRAPE

## (undated) DEVICE — GAMMEX® NON-LATEX PI TEXTURED SIZE 7.5, STERILE POLYISOPRENE POWDER-FREE SURGICAL GLOVE: Brand: GAMMEX

## (undated) DEVICE — FENESTRATED BIPOLAR FORCEPS: Brand: ENDOWRIST

## (undated) DEVICE — INSUFFLATION NEEDLE TO ESTABLISH PNEUMOPERITONEUM.: Brand: INSUFFLATION NEEDLE

## (undated) DEVICE — SYRINGE 5ML LL TIP

## (undated) DEVICE — PROGRASP FORCEPS: Brand: ENDOWRIST

## (undated) DEVICE — CANNULA SEAL

## (undated) DEVICE — NEEDLE SPINAL 20X3-1/2 YELLOW

## (undated) DEVICE — ANCHOR TISSUE RETRIEVAL SYSTEM, BAG SIZE 1200 ML, PORT SIZE 15 MM: Brand: ANCHOR TISSUE RETRIEVAL SYSTEM

## (undated) DEVICE — 40580 - THE PINK PAD - ADVANCED TRENDELENBURG POSITIONING KIT: Brand: 40580 - THE PINK PAD - ADVANCED TRENDELENBURG POSITIONING KIT

## (undated) DEVICE — SUTURE VLOC 180 0 12\" 0316

## (undated) DEVICE — ARM DRAPE

## (undated) DEVICE — SPECIMEN TRAP LUKI

## (undated) DEVICE — GYN CDS: Brand: MEDLINE INDUSTRIES, INC.

## (undated) DEVICE — DERMABOND LIQUID ADHESIVE

## (undated) DEVICE — [HIGH FLOW INSUFFLATOR,  DO NOT USE IF PACKAGE IS DAMAGED,  KEEP DRY,  KEEP AWAY FROM SUNLIGHT,  PROTECT FROM HEAT AND RADIOACTIVE SOURCES.]: Brand: PNEUMOSURE

## (undated) DEVICE — SUTURE VICRYL 0 UR-6

## (undated) DEVICE — SUTURE VICRYL 0

## (undated) DEVICE — GYN LAP/ROBOTIC: Brand: MEDLINE INDUSTRIES, INC.

## (undated) DEVICE — GAMMEX® NON-LATEX PI TEXTURED SIZE 6.5, STERILE POLYISOPRENE POWDER-FREE SURGICAL GLOVE: Brand: GAMMEX

## (undated) DEVICE — MONOPOLAR CURVED SCISSORS: Brand: ENDOWRIST

## (undated) NOTE — LETTER
[facility] [address], [address] [place] [address]    Consent for Anesthesia   1.    Oracio Palomo agree to be cared for by a physician anesthesiologist alone and/or with a nurse anesthetist, who is specially trained to monitor me and give me m allergic reactions to medications, injury to my airway, heart, lungs, vision, nerves, or muscles and in extremely rare instances death. 5. My doctor has explained to me other choices available to me for my care (alternatives).   6. Pregnant Patients (“epid Printed: 3/18/2020 at 2.00 PM    Medical Record #: [id number]                                            Page 1 of 1

## (undated) NOTE — IP AVS SNAPSHOT
Patient Demographics     Address  [address] [place] [address]  [place]  [address] Phone  [phone] [facility])  [phone] (Mobile) *Preferred* E-mail Address  [email or web address]      Emergency Contact(s)     Name Relation Home Work PayStand Instructions Authorizing Provider Morning Afternoon Evening As Needed   acetaminophen 325 MG Tabs  Commonly known as:  TYLENOL  Next dose due:   Take next dose tonight Wednesday, 2/26 at 9 pm      Take 2 tablets (650 mg total) by mouth 3 (three) times rosina insulin detemir 100 UNIT/ML Sopn  Commonly known as:  LEVEMIR  Next dose due: Take next dose tonight Wednesday, 2/26 at 9 pm      Inject 15 Units into the skin nightly.   Stop taking on:  March 27, 2020   Tera Strickland MD         Labetalol HCl 100 MG Tabs Next dose due: Take next dose tomorrow, Thursday, 2/27 at 9 am      Take by mouth daily. traMADol HCl 50 MG Tabs  Commonly known as:  ULTRAM  Next dose due:   Take next dose tonight Wednesday, 2/26 at 9 pm      Take 1 tablet (50 mg total) by mouth [id number] aspirin chewable tab 81 mg (Or Linked Group #1) 02/26/20 0854 Given      [id number] cetirizine (ZYRTEC) tab 10 mg 02/26/20 0854 Given      [id number] gabapentin (NEURONTIN) cap 400 mg 02/25/20 2129 Given      [id number] magnesium oxide (MAG-OX) tab BASIC METABOLIC PANEL (8) [[id number]] (Abnormal)  Resulted: 02/26/20 0654, Result status: Final result   Ordering provider:  JB Landry  02/25/20 2300 Resulting lab:  [facility]    Specimen Information    Type Source Collected On   Blood — 02/26/20 0 ---------                               -----------         ------                     CBC W/ DIFFERENTIAL[[id number]]          Abnormal            Final result                 Please view results for these tests on the individual orders.     Specimen Inform MEDICAL RECORD #:   [id number]       YOB: 1945  ADMISSION DATE:       02/04/2020    HISTORY AND PHYSICAL EXAMINATION    HISTORY OF PRESENT ILLNESS:  The patient is a 79-year-old with history of stroke and left hemiparesis.   The patient is w SS.1 Mariella Walton MD on 2/3/2020  1:38 PM                        Consults - MD Consult Notes      Consults signed by Audelia Anne MD at 2/21/2020  9:44 AM     Author:  Audelia Anne MD Service:  Allergy Author Type:  Physician    Diana Nguyen • Essential hypertension    • Hepatitis    • High blood pressure    • High cholesterol    • Migraines    • Muscle weakness    • Neuropathy    • Osteoporosis    • PONV (postoperative nausea and vomiting)    • Psychiatric disorder    • Renal disorder     sma Intravenous, Q6H PRN **OR** Metoclopramide HCl (REGLAN) injection 5 mg, 5 mg, Intravenous, Q8H PRN  •  cetirizine (ZYRTEC) tab 10 mg, 10 mg, Oral, Daily  •  PEG 3350 (MIRALAX) powder packet 17 g, 17 g, Oral, Daily  •  Clopidogrel Bisulfate (PLAVIX) tab 75 •  Fleet Enema (FLEET) 7-19 GM/118ML enema 133 mL, 1 enema, Rectal, Once PRN  •  Saline Nasal Spray (SALINE MIST) 1 spray, 1 spray, Each Nare, Q3H PRN  •  glucose (DEX4) oral liquid 15 g, 15 g, Oral, Q15 Min PRN **OR** Glucose-Vitamin C (DEX-4) chewable ta Primary reason for consult was family's concern for HAE. HAE exacerbations last days (not minutes or hours) and do not respond to antihistamine or steroid therapy. Complement studies are normal. Thus, she does not have HAE.     Recommendations:  Recommend t PHYSICAL THERAPY TREATMENT NOTE - INPATIENT    Room Number: 9811/1735-T   Session: 9/10[BR.1]  Number of Visits to Meet Established Goals: 10(5 sessions added.)    Presenting Problem: pelvic mass s/p total hysterectomy on 2/4 post-op STEMI[BR.2]    Histor Procedure Laterality Date   • CARDIAC PACEMAKER PLACEMENT     • CATARACT      left eye   • OTHER      Pacemaker   • XI ROBOT-ASSISTED LAPAROSCOPIC HYSTERECTOMY Bilateral 2/4/2020    Performed by Ngoc Núñez MD at [facility] MAIN OR[BR.2]       SUBJECTIVE  \ Stoop/Curb Assistance: Not tested  Comment : fatigue[BR.2]    Skilled Therapy Provided:     The pt performed standing balance activities for posture stability, proper head control/placement and reaching. Pt performed ORIN ROM to reduce stiffness.    Pt worke Goal #3 Pt will be able to transfer from bed to wheelchair with minimal assistance. Ongoing       Goal #4  New Goal: Pt will improve AM-PAC score by 5 points from 72.57%.    Established on 2/12   Goal #5     Goal #6     Goal Comments: Goals established on CAD in native artery    Pure hypercholesterolemia    Palliative care encounter    Goals of care, counseling/discussion    ROLANDO (acute kidney injury) ([facility] [address]    Hypernatremia[AR.2]      Past Medical History[AR.1]  Past Medical History:   Diagnosis Date   • A -   Sitting down on and standing up from a chair with arms (e.g., wheelchair, bedside commode, etc.): Unable   -   Moving from lying on back to sitting on the side of the bed?: Unable[AR.2]   How much help from another person does the patient currently nee commode - direct handoff to staff)[AR.2]    ASSESSMENT     Pt seen this AM for bed mobility, continued transfer training. Pt still requiring MOD A for SPT from EOB<>chair. Pt with limited [address] on LLE d/t pain.  Pt with inconsistent participation in sess Author:  Nkechi Cerda OT Service:  Juliane Bumpers Author Type:  Occupational Therapist    Filed:  2/26/2020  1:35 PM Date of Service:  2/26/2020  1:11 PM Status:  Signed    :  Nkechi Cerda OT (Occupational Therapist)       OCCUPATIONAL THERAPY KERRY • Psychiatric disorder    • Renal disorder     smaller kidney on the right side   • Stroke [facility])        Past Surgical History  Past Surgical History:   Procedure Laterality Date   • CARDIAC PACEMAKER PLACEMENT     • CATARACT      left eye   • OTHER      Pa chair arm with R hand and assisted herself with pivot transfer. Pt asked for fine motor exercises. Issued theraputty, handout, and foam block. Completed putty exercises with cueing.   Also, encouraged her to use foam block in L hand to maintain functional Patient will transfer from supine to sit:  with CGA  Patient will transfer to toilet:  with min A      Additional Goals:  Pt will complete PHQ9 - MET 2/19, but pt does not want the scores to be recorded.     Pt will complete at least 3 fine motor exercises ROLANDO (acute kidney injury) ([facility] [address]    Hypernatremia      Past Medical History  Past Medical History:   Diagnosis Date   • Arrhythmia    • Back problem    • Cataract    • Complete heart block ([facility] [address]     Pacemeker--Dr. Vigil December   • Diabetes [facility])    • Esophageal re Skilled Therapy Provided: BP within the parameter. Pt has been wanting to use the commode. PCT brought the commode to the room. PT and OT encouraged the pt to take steps to reach the commode, but pt shook her head. Caregiver present.   Supine to sit wit Patient will perform toileting: with supervision     Functional Transfer Goals  Patient will transfer from supine to sit:  with supervision  Patient will transfer from sit to stand:  with supervision  Patient will transfer to toilet:  with supervision Description:  Patient's Short Term Goal: Improve strength    Interventions:   - PT/OT consult  - See additional Care Plan goals for specific interventions

## (undated) NOTE — LETTER
Date & Time: 7/3/2019, 10:39 PM  Patient: Kathleen Hay  Encounter Provider(s):    Ruth Liz MD         This certifies that I, Kathleen Hay, a patient at an [facility], am leaving the facility voluntarily and against the

## (undated) NOTE — IP AVS SNAPSHOT
[address]            (For Outpatient Use Only) Initial Admit Date: 2/3/2020   Inpt/Obs Admit Date: Inpt: 2/4/20 / Obs: 02/03/20   Discharge Date:    Hospital Acct:  [de-identified]   MRN: [de-identified]   CSN: [id number]   CEID: [id number] Subscriber ID:  Pt Rel to Subscriber:    Hospital Account Financial Class: Medicare    February 26, 2020

## (undated) NOTE — ED AVS SNAPSHOT
Latasha Schirmer   MRN: [id number]    Department:  [facility] Emergency Department   Date of Visit:  7/3/2019           Disclosure     Insurance plans vary and the physician(s) referred by the ER may not be covered by your plan.  Please contact your tell this physician (or your personal doctor if your instructions are to return to your personal doctor) about any new or lasting problems. The primary care or specialist physician will see patients referred from the [facility] Emergency Department.  Annie Mahmood

## (undated) NOTE — LETTER
AUTHORIZATION FOR SURGICAL OPERATION OR OTHER PROCEDURE    1. I hereby authorize Dr. Rosio Bowen and [facility] staff assigned to my case to perform the following operation and/or procedure at the [facility]: Botox Injections      2.   My physici []  Parent    Responsible person                          []  Spouse  In case of minor or                    [] Other  _____________   Incompetent name:  __________________________________________________                               (please prin

## (undated) NOTE — LETTER
GUALBERTO Notifier: Silas/ZIMPERIUM   JOSE. Patient Name: Ghazala Chen. Identification Number: [id number]      Advance Beneficiary Notice of Noncoverage (ABN)  NOTE:  If Medicare doesn’t pay for D.botox below, you may have to pay.   Medicare does not pay f appeal if Medicare is not billed. ? OPTION 3. I don’t want the D.botox listed above. I understand with this choice  I am not responsible for payment, and I cannot appeal to see if Medicare would pay. H. Additional Information:     This notice gives our

## (undated) NOTE — LETTER
[facility]  Sarah Fischer 63 [address] [facility], [address] [place] [address]    Consent for Operation    Date: __________________    Time: _______________    1. I authorize the performance upon Safia Nixon the following operation:    LINQ insertion    2.  I authorize  videotape. The \[facility]\"" will not be responsible for storage or maintenance of this tape. 6. For the purpose of advancing medical education, I consent to the admittance of observers to the Operating Room.     7. I authorize the use of any specimen, organs Signature of Patient:   ___________________________    When the patient is a minor or mentally incompetent to give consent:  Signature of person authorized to consent for patient: ___________________________   Relationship to patient: _____________________ drugs/illegal medications). Failure to inform my anesthesiologist about these medicines may increase my risk of anesthetic complications. · If I am allergic to anything or have had a reaction to anesthesia before.     3. I understand how the anesthesia med I have discussed the procedure and information above with the patient (or patient’s representative) and answered their questions. The patient or their representative has agreed to have anesthesia services.     _______________________________________________

## (undated) NOTE — LETTER
GUALBERTO Notifier: Silas/LiveProfile   JOSE. Patient Name: Nadia Hatfield. Identification Number: [id number]      Advance Beneficiary Notice of Noncoverage (ABN)  NOTE:  If Medicare doesn’t pay for D. item/service below, you may have to pay.   Medicare does n with this choice  I am not responsible for payment, and I cannot appeal to see if Medicare would pay. H. Additional Information: This notice gives our opinion, not an official Medicare decision.  If you have other questions on this notice or Medicare

## (undated) NOTE — LETTER
[facility] [address], [address]  Consent for Procedure/Sedation    Date:     Time:       1. I authorize the performance upon Safia Nixon the following:  DUAL CHAMBER PERMANENT PACEMAKER IMPLANT    2.  I authorize Dr. Kevin Lowe (and ________________________________    ___________________    Witness: _________________________      Date: ___________________    Printed: 2018   10:19 AM  Patient Name: Juan M Stewart        : 1945       Medical Record #: [id number]

## (undated) NOTE — LETTER
[facility] [address], [address]  Consent for Procedure/Sedation    Date:     Time:       1.  I authorize the performance upon Almita Parra the following: Peripheral angiography, atherectomy, percutaneous transluminal angioplast you may develop a skin reaction.       Signature of Patient: _______________________________________________________    Signature of person authorized                                           Relationship to  to consent for patient: _______________________

## (undated) NOTE — LETTER
[facility] [address], [address] [place] [address]    Consent for Anesthesia   1.    Woody Garcia agree to be cared for by a physician anesthesiologist alone and/or with a nurse anesthetist, who is specially trained to monitor me and give me m allergic reactions to medications, injury to my airway, heart, lungs, vision, nerves, or muscles and in extremely rare instances death. 5. My doctor has explained to me other choices available to me for my care (alternatives).   6. Pregnant Patients (“epid Printed: 3/18/2020 at 11:28 AM    Medical Record #: [id number]                                            Page 1 of 1

## (undated) NOTE — LETTER
Debra Yoon 182 6 13[facility] E  [place], [address] [facility]    Consent for Operation  Date: __________________                                Time: _______________    1.  I authorize the performance upon Reyes Lezama the following operation:  Procedure( revealed by the pictures or by descriptive texts accompanying them. If the procedure has been videotaped, the surgeon will obtain the original videotape. The hospital will not be responsible for storage or maintenance of this tape.   7. For the purpose of a THAT MY DOCTOR PROVIDED ME WITH THE ABOVE EXPLANATIONS, THAT ALL BLANKS OR STATEMENTS REQUIRING INSERTION OR COMPLETION WERE FILLED IN.     Signature of Patient:   ___________________________    When the patient is a minor or mentally incompetent to give co iii. All of the medicines I take (including prescriptions, herbal supplements, and pills I can buy without a prescription (including street drugs/illegal medications).  Failure to inform my anesthesiologist about these medicines may increase my risk of anes _____________________________________________________________________________  Anesthesiologist Signature     Date   Time  I have discussed the procedure and information above with the patient (or patient’s representative) and answered their questions.  The

## (undated) NOTE — LETTER
[facility] [address], [address] [place] [address]    Consent for Anesthesia   1.    Ghazal Fountain agree to be cared for by a physician anesthesiologist alone and/or with a nurse anesthetist, who is specially trained to monitor me and give me m allergic reactions to medications, injury to my airway, heart, lungs, vision, nerves, or muscles and in extremely rare instances death. 5. My doctor has explained to me other choices available to me for my care (alternatives).   6. Pregnant Patients (“epid Printed: 3/18/2020 at 11:28 AM    Medical Record #: [id number]                                            Page 1 of 1

## (undated) NOTE — LETTER
AUTHORIZATION FOR SURGICAL OPERATION OR OTHER PROCEDURE    1.  I hereby authorize Dr. Josephine Lindsay, and [facility] staff assigned to my case to perform the following operation and/or procedure at the [facility]:  Botox injections  ________________ Patient signature:  ___________________________________________________             Relationship to Patient:           []  Parent    Responsible person                          []  Spouse  In case of minor or                    [] Other  _____________   In

## (undated) NOTE — LETTER
AUTHORIZATION FOR SURGICAL OPERATION OR OTHER PROCEDURE    1.  I hereby authorize Dr. Zana Love and the [facility] staff assigned to my case to perform the following operation and/or procedure at the [facility]:    Trigger point injections   ____________ Time:  ________ A. M.  P.M.        Patient Name:  ______________________________________________________  (please print)       Patient signature:  ___________________________________________________             Relationship to Patient:

## (undated) NOTE — LETTER
Debra Yoon 182 6 [address] E  [place], [address] [place] [address]    Consent for Operation  Date: __________________                                Time: _______________    1.  I authorize the performance upon Olman Crawford the following operation:  Procedure( procedure has been videotaped, the surgeon will obtain the original videotape. The hospital will not be responsible for storage or maintenance of this tape.   7. For the purpose of advancing medical education, I consent to the admittance of observers to the STATEMENTS REQUIRING INSERTION OR COMPLETION WERE FILLED IN.     Signature of Patient:   ___________________________    When the patient is a minor or mentally incompetent to give consent:  Signature of person authorized to consent for patient: ____________ supplements, and pills I can buy without a prescription (including street drugs/illegal medications). Failure to inform my anesthesiologist about these medicines may increase my risk of anesthetic complications. iv.  If I am allergic to anything or have ha Anesthesiologist Signature     Date   Time  I have discussed the procedure and information above with the patient (or patient’s representative) and answered their questions. The patient or their representative has agreed to have anesthesia services.     ___

## (undated) NOTE — LETTER
[facility]  Sarah Fischer 61 [address] [employer], [address] [place] [address]    Consent for Operation    Date: __________________    Time: _______________    1. I authorize the performance upon Velasquez Urena the following operation:    LINQ implant    2.  I authorize Dr. Shiela Palma videotape. [facility] will not be responsible for storage or maintenance of this tape. 6. For the purpose of advancing medical education, I consent to the admittance of observers to the Operating Room.     7. I authorize the use of any specimen, organs Signature of Patient:   ___________________________    When the patient is a minor or mentally incompetent to give consent:  Signature of person authorized to consent for patient: ___________________________   Relationship to patient: _____________________ drugs/illegal medications). Failure to inform my anesthesiologist about these medicines may increase my risk of anesthetic complications. · If I am allergic to anything or have had a reaction to anesthesia before.     3. I understand how the anesthesia med I have discussed the procedure and information above with the patient (or patient’s representative) and answered their questions. The patient or their representative has agreed to have anesthesia services.     _______________________________________________

## (undated) NOTE — IP AVS SNAPSHOT
Patient Demographics     Address  [address] [place] [address]  Ila Harris [address] Phone  [phone] [facility])  [phone] [facility]      Emergency Contact(s)     Name Relation Home Work Pop Daughter [phone]      Jose Paluino Daughter   6 Specialty:  Diabetes Services  Why:  After discharge from rehab  Contact information:  Debra [address], [address] [facility] [address]           needs PCP . Schedule an appointment as soon as possible for a visit in 2 weeks. Commonly known as:  LEVEMIR  Next dose due:  3/21 am      Inject 8 Units into the skin daily.    Ari Ramos, ROBERT         Labetalol HCl 100 MG Tabs  Commonly known as:  Trevor Shrestha  Next dose due:  3/21 at bedtime      Take 1 tablet (100 mg total) by tiffani [id number] Labetalol HCl (NORMODYNE) tab 100 mg 03/21/18 1543 Given      [id number] Losartan Potassium (COZAAR) tab 50 mg 03/20/18 2158 Given      [id number] Losartan Potassium (COZAAR) tab 50 mg 03/21/18 0830 Given      [id number] acetaminophen (TYLENOL) tab Location [address] [facility] [address] Attending Jamaal Bhagat MD   Hosp Day # 0 PCP No primary care provider on file.      Chief Complaint: weakness, facial droop    History of Present Illness: Margarito Pang is a 81year old female with h/o HTN, DM type 2 who pr Chest and Back: No tenderness or deformity. Abdomen: Soft, nontender, nondistended. Positive bowel sounds. No rebound, guarding or organomegaly. Neurologic: left hemifacial droop   Musculoskeletal: Moves all extremities. [GS.1] MP 4/5 in LUE, 5/5 in RUE, GS.1 Toño Marti MD on 3/11/2018  1:29 PM  GS. 2 - Wilda Avila MD on 3/11/2018  1:47 PM                        Consults - MD Consult Notes      Consults signed by Rufino Avitia MD at 3/17/2018  4:51 PM     Author:  Rufino Avitia MD Service:  [id number] Despite this, I think she has decision making capacity. She understands, appreciates, and rationalizes enough to communicate a choice. She wants to get better and is motivated to go to rehab. AXIS 1: Adjustment disorder with anxiety and depressed mood. didn't follow medical advice because she was anxious/worried, she says, \"No. I'm not anxious. \" (NOTE- per daughters, her mother is anxious about health issues and has been avoiding doctors and dentists her entire life).      Talking to the her daughters a . Has 2 daughters. 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 She lives alone. Has a reconstructionist Bapt[employer] bj. [RH.3]    Past Medical History:   Diagnosis Date   • Diabetes ([facility] [address]    • High blood pressure      Past Surgical History: •  FLEET ENEMA (FLEET) 7-19 GM/118ML enema 133 mL, 1 enema, Rectal, Once PRN  •  ondansetron HCl (ZOFRAN) injection 4 mg, 4 mg, Intravenous, Q6H PRN **OR** Metoclopramide HCl (REGLAN) injection 10 mg, 10 mg, Intravenous, Q8H PRN  •  Enoxaparin Sodium (LOVE Thought process:[RH.1] logical[RH.3]  Thought content:[RH.1] ruminations[RH.3]  Perceptions:[RH.1] no hallucinations[RH.3]  Associations: Intact    Orientation:[RH.1] Alert and Oriented person, place, time, situation[RH.3]  Attention and Concentration:[RH. RH.4 - Lamberto Doyle MD on 3/17/2018  4:37 PM  RH. 5 - Lamberto Doyle MD on 3/15/2018  6:16 PM                     D/C Summary     No notes of this type exist for this encounter.          Physical Therapy Notes (last 72 hours) (Notes from 3/18/2018  5:00 PM t Management Techniques: Activity promotion; Body mechanics;Breathing techniques;Relaxation;Repositioning    BALANCE                                                                                                                     Static Sitting: Poor -  Dy Patient End of Session: Up in chair;Needs met;Call light within reach;RN aware of session/findings; All patient questions and concerns addressed      ASSESSMENT     Pt seen for seated therex, trunk stability, and cardiopulmonary endurance due to [facility] :  Boom Bardales PTA (Physical Therapy Assistant)       Second attempt to see Pt 3/20/2018 for PT session - Pt currently off floor for LINQ per chart. Will f/u tomorrow 3/21/18[AR.1]     Attribution Soria    AR. 1 - Boom Bardales PTA on 3/ Room Number: [id number]  Session: 4   Number of Visits to Meet Established Goals: 7    Presenting Problem: acute CVA    History related to current admission: Pt with L side weakness. In the ED pt with significant HTN.   Per Neurology note acute R MCA stro Supine to Sit : Not tested  Sit to Stand: Not tested    Skilled Therapy Provided: Pt reported less pain in L thumb, 5 out of 10 with movement today.  Reported no pain at rest.  Educated the pt about use of foam block to maintain proper hand resting position assist-discontinue goal, not appropriate at this time - 3/16     UE Exercise Program Goal  Patient will be minimum assistance with left AAROM HEP (home exercise program).      Additional Goals:  Pt will participate in PHQ-9 depression screen. -met  Pt will :  Stephanie Stauffer, OT (Occupational Therapist)       Attempted to see pt for OT tx; however BP very high at this time per RN. Will f/u again either later today or tomorrow pending BP. RN in agreement. [MC.1]      Attribution Key    MC.1 - Carlos Enrique

## (undated) NOTE — LETTER
[facility] [address], [address]  Consent for Procedure/Sedation    Date: 2/4/20    Time: 3353      1.  I authorize the performance upon Maura Boo the following:cardiac catheterization, left ventricular cineangiography, bilat period, the physician will determine when the applicable recovery period ends for purposes of reinstating the Do Not Resuscitate (DNR) order.     Signature of Patient: ____________________________________________________    Signature of person authorized

## (undated) NOTE — LETTER
[address] [facility]     I agree to have a Peripherally Inserted Central Catheter (PICC) placed in my arm.    1. The PICC insertion procedure, care, maintenance, risks, benefits, and complications have been explained to me by my physic also discussed reasonable alternatives to the PICC, including risks, benefits, and side effects related to the alternatives and risks related to not receiving this procedure.     8.  I have expressed any questions about this procedure to my physician or the